# Patient Record
Sex: FEMALE | Race: WHITE | NOT HISPANIC OR LATINO | Employment: FULL TIME | URBAN - NONMETROPOLITAN AREA
[De-identification: names, ages, dates, MRNs, and addresses within clinical notes are randomized per-mention and may not be internally consistent; named-entity substitution may affect disease eponyms.]

---

## 2018-03-01 ENCOUNTER — DOCUMENTATION ONLY (OUTPATIENT)
Dept: FAMILY MEDICINE | Facility: OTHER | Age: 23
End: 2018-03-01

## 2018-03-01 PROBLEM — F17.200 NICOTINE USE DISORDER: Status: ACTIVE | Noted: 2018-03-01

## 2018-03-01 RX ORDER — DOCUSATE SODIUM 100 MG/1
100 CAPSULE, LIQUID FILLED ORAL 2 TIMES DAILY PRN
COMMUNITY
Start: 2016-11-14 | End: 2018-07-31

## 2018-03-01 RX ORDER — OXYCODONE AND ACETAMINOPHEN 5; 325 MG/1; MG/1
1-2 TABLET ORAL EVERY 4 HOURS PRN
COMMUNITY
Start: 2016-11-14 | End: 2018-07-31

## 2018-03-01 RX ORDER — BREAST PUMP
EACH MISCELLANEOUS
COMMUNITY
Start: 2016-11-14 | End: 2018-07-31

## 2018-03-01 RX ORDER — IBUPROFEN 600 MG/1
600 TABLET, FILM COATED ORAL EVERY 6 HOURS PRN
COMMUNITY
Start: 2016-11-14 | End: 2018-09-24

## 2018-07-31 ENCOUNTER — APPOINTMENT (OUTPATIENT)
Dept: GENERAL RADIOLOGY | Facility: OTHER | Age: 23
End: 2018-07-31
Attending: FAMILY MEDICINE

## 2018-07-31 ENCOUNTER — HOSPITAL ENCOUNTER (EMERGENCY)
Facility: OTHER | Age: 23
Discharge: HOME | End: 2018-07-31
Attending: FAMILY MEDICINE | Admitting: FAMILY MEDICINE

## 2018-07-31 VITALS
HEART RATE: 82 BPM | DIASTOLIC BLOOD PRESSURE: 78 MMHG | SYSTOLIC BLOOD PRESSURE: 132 MMHG | WEIGHT: 250 LBS | OXYGEN SATURATION: 99 % | TEMPERATURE: 98.2 F | HEIGHT: 62 IN | RESPIRATION RATE: 20 BRPM | BODY MASS INDEX: 46.01 KG/M2
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
## 2018-07-31 DIAGNOSIS — S80.01XA CONTUSION OF RIGHT KNEE, INITIAL ENCOUNTER: ICD-10-CM

## 2018-07-31 DIAGNOSIS — S20.219A CONTUSION OF CHEST WALL, UNSPECIFIED LATERALITY, INITIAL ENCOUNTER: ICD-10-CM

## 2018-07-31 LAB
ANION GAP SERPL CALCULATED.3IONS-SCNC: 9 MMOL/L (ref 3–14)
BASOPHILS # BLD AUTO: 0 10E9/L (ref 0–0.2)
BASOPHILS NFR BLD AUTO: 0.4 %
BUN SERPL-MCNC: 16 MG/DL (ref 7–25)
CALCIUM SERPL-MCNC: 10.1 MG/DL (ref 8.6–10.3)
CHLORIDE SERPL-SCNC: 105 MMOL/L (ref 98–107)
CO2 SERPL-SCNC: 24 MMOL/L (ref 21–31)
CREAT SERPL-MCNC: 0.61 MG/DL (ref 0.6–1.2)
DIFFERENTIAL METHOD BLD: ABNORMAL
EOSINOPHIL # BLD AUTO: 0.1 10E9/L (ref 0–0.7)
EOSINOPHIL NFR BLD AUTO: 1.5 %
ERYTHROCYTE [DISTWIDTH] IN BLOOD BY AUTOMATED COUNT: 18.7 % (ref 10–15)
GFR SERPL CREATININE-BSD FRML MDRD: >90 ML/MIN/1.7M2
GLUCOSE SERPL-MCNC: 110 MG/DL (ref 70–105)
HCT VFR BLD AUTO: 39.3 % (ref 35–47)
HGB BLD-MCNC: 12.7 G/DL (ref 11.7–15.7)
IMM GRANULOCYTES # BLD: 0.1 10E9/L (ref 0–0.4)
IMM GRANULOCYTES NFR BLD: 0.9 %
LYMPHOCYTES # BLD AUTO: 3.6 10E9/L (ref 0.8–5.3)
LYMPHOCYTES NFR BLD AUTO: 39.1 %
MCH RBC QN AUTO: 24.6 PG (ref 26.5–33)
MCHC RBC AUTO-ENTMCNC: 32.3 G/DL (ref 31.5–36.5)
MCV RBC AUTO: 76 FL (ref 78–100)
MONOCYTES # BLD AUTO: 0.5 10E9/L (ref 0–1.3)
MONOCYTES NFR BLD AUTO: 5.6 %
NEUTROPHILS # BLD AUTO: 4.9 10E9/L (ref 1.6–8.3)
NEUTROPHILS NFR BLD AUTO: 52.5 %
PLATELET # BLD AUTO: 322 10E9/L (ref 150–450)
POTASSIUM SERPL-SCNC: 3.6 MMOL/L (ref 3.5–5.1)
RBC # BLD AUTO: 5.16 10E12/L (ref 3.8–5.2)
SODIUM SERPL-SCNC: 138 MMOL/L (ref 134–144)
WBC # BLD AUTO: 9.3 10E9/L (ref 4–11)

## 2018-07-31 PROCEDURE — 93010 ELECTROCARDIOGRAM REPORT: CPT | Performed by: INTERNAL MEDICINE

## 2018-07-31 PROCEDURE — 36415 COLL VENOUS BLD VENIPUNCTURE: CPT | Performed by: FAMILY MEDICINE

## 2018-07-31 PROCEDURE — 93005 ELECTROCARDIOGRAM TRACING: CPT | Performed by: FAMILY MEDICINE

## 2018-07-31 PROCEDURE — 99284 EMERGENCY DEPT VISIT MOD MDM: CPT | Mod: Z6 | Performed by: FAMILY MEDICINE

## 2018-07-31 PROCEDURE — 99284 EMERGENCY DEPT VISIT MOD MDM: CPT | Mod: 25 | Performed by: FAMILY MEDICINE

## 2018-07-31 PROCEDURE — 80048 BASIC METABOLIC PNL TOTAL CA: CPT | Performed by: FAMILY MEDICINE

## 2018-07-31 PROCEDURE — 71045 X-RAY EXAM CHEST 1 VIEW: CPT

## 2018-07-31 PROCEDURE — 73562 X-RAY EXAM OF KNEE 3: CPT | Mod: RT

## 2018-07-31 PROCEDURE — 85025 COMPLETE CBC W/AUTO DIFF WBC: CPT | Performed by: FAMILY MEDICINE

## 2018-07-31 ASSESSMENT — ENCOUNTER SYMPTOMS
CHEST TIGHTNESS: 0
SINUS PRESSURE: 0
STRIDOR: 0
DIFFICULTY BREATHING: 0
ABDOMINAL PAIN: 0
SHORTNESS OF BREATH: 0
SEIZURES: 0
NAUSEA: 0
EYES NEGATIVE: 1
PSYCHIATRIC NEGATIVE: 1
TROUBLE SWALLOWING: 0
ABDOMINAL DISTENTION: 0
COLOR CHANGE: 0
COUGH: 0
PALPITATIONS: 0
LOSS OF CONSCIOUSNESS: 0
NECK PAIN: 0
WHEEZING: 0
HEADACHES: 0
VOICE CHANGE: 0
VOMITING: 0
APNEA: 0
FACIAL SWELLING: 0
NEUROLOGICAL NEGATIVE: 1
CHOKING: 0
HEMATOLOGIC/LYMPHATIC NEGATIVE: 1
BACK PAIN: 0
SINUS PAIN: 0
CONSTITUTIONAL NEGATIVE: 1

## 2018-07-31 NOTE — DISCHARGE INSTRUCTIONS
Chest Contusion    A contusion is a bruise to the skin, muscle, or ribs. It may cause pain, tenderness, and swelling. It may turn the skin purple until it heals. Contusions take a few days to a few weeks to heal.  Home care  Follow these guidelines when caring for yourself at home:    Rest. Don t do any heavy lifting or strenuous activity. Don t do any activity that causes pain.    Put an ice pack on the injured area. Do this for 20 minutes every 1 to 2 hours the first day. You can make an ice pack by wrapping a plastic bag of ice cubes in a thin towel. Continue to use the ice pack 3 to 4 times a day for the next 2 days. Then use the ice pack as needed to ease pain and swelling.    After 1 to 2 days you may put a warm compress on the area. Do this for 10 minutes several times a day. A warm compress is a clean cloth that s damp with warm water.    Hold a pillow to the affected area when you cough. This will help ease pain.    You may use over-the-counter pain medicine such as acetaminophen or ibuprofen to control pain, unless another pain medicine was prescribed. If you have chronic liver or kidney disease, talk with your healthcare provider before using these medicines. Also talk with your provider if you ve had a stomach ulcer or gastrointestinal bleeding.  Follow-up care  Follow up with your healthcare provider, or as advised.  When to seek medical advice  Call your healthcare provider right away if any of these occur:    New abdominal pain or abdominal pain that gets worse    Fever of 100.4 F (38 C) or higher, or as directed by your healthcare provider  Call 911  Call 911 if any of these occur:     Dizziness, weakness, or fainting    Shortness of breath, trouble breathing, or breathing fast    Chest pain gets worse when you breathe    Severe pain that comes on suddenly or lasts more than an hour  Date Last Reviewed: 5/1/2017 2000-2017 [employer]. [address], [place] [address]. All  rights reserved. This information is not intended as a substitute for professional medical care. Always follow your healthcare professional's instructions.          Lower Extremity Contusion  You have a contusion (bruise) of a lower extremity (leg, knee, ankle, foot, or toe). Symptoms include pain, swelling, and skin discoloration. No bones are broken. This injury may take from a few days to a few weeks to heal.  During that time, the bruise may change from reddish in color, to purple-blue, to green-yellow, to yellow-brown.  Home care    Unless another medicine was prescribed, you can take acetaminophen, ibuprofen, or naproxen to control pain. (If you have chronic liver or kidney disease or ever had a stomach ulcer or gastrointestinal bleeding, talk with your doctor before using these medicines.)    Elevate the injured area to reduce pain and swelling. As much as possible, sit or lie down with the injured area raised about the level of your heart. This is especially important during the first 48 hours.    Ice the injured area to help reduce pain and swelling. Wrap a cold source (ice pack or ice cubes in a plastic bag) in a thin towel. Apply to the bruised area for 20 minutes every 1 to 2 hours the first day. Continue this 3 to 4 times a day until the pain and swelling goes away.    If crutches have been advised, do not bear full weight on the injured leg until you can do so without pain. You may return to sports when you are able to put full weight and impact on the injured leg without pain.  Follow up  Follow up with your healthcare provider or our staff as advised. Call if you are not improving within the next 1 to 2 weeks.  When to seek medical advice   Call your healthcare provider right away if any of these occur:    Increased pain or swelling    Foot or toes become cold, blue, numb or tingly    Signs of infection: Warmth, drainage, or increased redness or pain around the injury    Inability to move the injured  area     Frequent bruising for unknown reasons  Date Last Reviewed: 2/1/2017 2000-2017 [employer]. [address] [place] [address], [place] [address]. All rights reserved. This information is not intended as a substitute for professional medical care. Always follow your healthcare professional's instructions.
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
## 2018-07-31 NOTE — ED AVS SNAPSHOT
[facility]    [address]    [place] [address]    Phone:  [phone]    Fax:  [phone]                                       Simi Godwin   MRN: [id number]    Department:  [facility]   Date of Visit:  7/31/2018           After Visit Summary Signature Page     I have received my discharge instructions, and my questions have been answered. I have discussed any challenges I see with this plan with the nurse or doctor.    ..........................................................................................................................................  Patient/Patient Representative Signature      ..........................................................................................................................................  Patient Representative Print Name and Relationship to Patient    ..................................................               ................................................  Date                                            Time    ..........................................................................................................................................  Reviewed by Signature/Title    ...................................................              ..............................................  Date                                                            Time
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
## 2018-09-10 ENCOUNTER — TELEPHONE (OUTPATIENT)
Dept: OBGYN | Facility: OTHER | Age: 23
End: 2018-09-10

## 2018-09-10 DIAGNOSIS — O36.80X0 ENCOUNTER TO DETERMINE FETAL VIABILITY OF PREGNANCY, SINGLE OR UNSPECIFIED FETUS: Primary | ICD-10-CM

## 2018-09-10 NOTE — TELEPHONE ENCOUNTER
Simi is scheduled to see BRYCE on 9/24/18 for her OB Px.  She thinks that she is about 10 weeks along, but is not sure.  Please order a dating ultrasound.  Ronel Loredo on 9/10/2018 at 11:18 AM

## 2018-09-10 NOTE — TELEPHONE ENCOUNTER
Patient is scheduled to be seen on 9-24-18 by Dr. ARMAAN Pereira for an Initial OB Physical. Patient has not had any imaging at this time. Provider prefers an ultrasound to determine dating and viability prior to appointment time.     Order placed per provider preference.    Miguelina Palmer ............. 9/10/2018 11:24 AM

## 2018-09-24 ENCOUNTER — HOSPITAL ENCOUNTER (OUTPATIENT)
Dept: ULTRASOUND IMAGING | Facility: OTHER | Age: 23
Discharge: HOME | End: 2018-09-24
Attending: OBSTETRICS & GYNECOLOGY | Admitting: OBSTETRICS & GYNECOLOGY

## 2018-09-24 ENCOUNTER — PRENATAL OFFICE VISIT (OUTPATIENT)
Dept: OBGYN | Facility: OTHER | Age: 23
End: 2018-09-24
Attending: OBSTETRICS & GYNECOLOGY

## 2018-09-24 VITALS
BODY MASS INDEX: 52.67 KG/M2 | HEART RATE: 86 BPM | WEIGHT: 286.2 LBS | HEIGHT: 62 IN | SYSTOLIC BLOOD PRESSURE: 136 MMHG | DIASTOLIC BLOOD PRESSURE: 82 MMHG

## 2018-09-24 DIAGNOSIS — Z34.81 ENCOUNTER FOR SUPERVISION OF OTHER NORMAL PREGNANCY IN FIRST TRIMESTER: ICD-10-CM

## 2018-09-24 DIAGNOSIS — Z23 NEED FOR PROPHYLACTIC VACCINATION AND INOCULATION AGAINST INFLUENZA: ICD-10-CM

## 2018-09-24 DIAGNOSIS — O36.80X0 ENCOUNTER TO DETERMINE FETAL VIABILITY OF PREGNANCY, SINGLE OR UNSPECIFIED FETUS: ICD-10-CM

## 2018-09-24 DIAGNOSIS — Z98.891 HISTORY OF C-SECTION: ICD-10-CM

## 2018-09-24 LAB
C TRACH DNA SPEC QL PROBE+SIG AMP: NOT DETECTED
GLUCOSE SERPL-MCNC: 92 MG/DL (ref 70–105)
MISCELLANEOUS TEST: NORMAL
N GONORRHOEA DNA SPEC QL PROBE+SIG AMP: NOT DETECTED
SPECIMEN SOURCE: NORMAL

## 2018-09-24 PROCEDURE — 87086 URINE CULTURE/COLONY COUNT: CPT | Performed by: OBSTETRICS & GYNECOLOGY

## 2018-09-24 PROCEDURE — 90471 IMMUNIZATION ADMIN: CPT | Performed by: OBSTETRICS & GYNECOLOGY

## 2018-09-24 PROCEDURE — 86780 TREPONEMA PALLIDUM: CPT | Performed by: OBSTETRICS & GYNECOLOGY

## 2018-09-24 PROCEDURE — 86900 BLOOD TYPING SEROLOGIC ABO: CPT | Performed by: OBSTETRICS & GYNECOLOGY

## 2018-09-24 PROCEDURE — 76801 OB US < 14 WKS SINGLE FETUS: CPT

## 2018-09-24 PROCEDURE — 86850 RBC ANTIBODY SCREEN: CPT | Performed by: OBSTETRICS & GYNECOLOGY

## 2018-09-24 PROCEDURE — 86762 RUBELLA ANTIBODY: CPT | Performed by: OBSTETRICS & GYNECOLOGY

## 2018-09-24 PROCEDURE — 81401 MOPATH PROCEDURE LEVEL 2: CPT | Performed by: OBSTETRICS & GYNECOLOGY

## 2018-09-24 PROCEDURE — 81220 CFTR GENE COM VARIANTS: CPT | Performed by: OBSTETRICS & GYNECOLOGY

## 2018-09-24 PROCEDURE — 82947 ASSAY GLUCOSE BLOOD QUANT: CPT | Performed by: OBSTETRICS & GYNECOLOGY

## 2018-09-24 PROCEDURE — 87389 HIV-1 AG W/HIV-1&-2 AB AG IA: CPT | Performed by: OBSTETRICS & GYNECOLOGY

## 2018-09-24 PROCEDURE — 84999 UNLISTED CHEMISTRY PROCEDURE: CPT | Performed by: OBSTETRICS & GYNECOLOGY

## 2018-09-24 PROCEDURE — 36415 COLL VENOUS BLD VENIPUNCTURE: CPT | Performed by: OBSTETRICS & GYNECOLOGY

## 2018-09-24 PROCEDURE — 87491 CHLMYD TRACH DNA AMP PROBE: CPT | Performed by: OBSTETRICS & GYNECOLOGY

## 2018-09-24 PROCEDURE — 87591 N.GONORRHOEAE DNA AMP PROB: CPT | Performed by: OBSTETRICS & GYNECOLOGY

## 2018-09-24 PROCEDURE — 86901 BLOOD TYPING SEROLOGIC RH(D): CPT | Performed by: OBSTETRICS & GYNECOLOGY

## 2018-09-24 PROCEDURE — 99207 ZZC OB VISIT-NO CHARGE - GICH ONLY: CPT | Performed by: OBSTETRICS & GYNECOLOGY

## 2018-09-24 PROCEDURE — 90686 IIV4 VACC NO PRSV 0.5 ML IM: CPT | Performed by: OBSTETRICS & GYNECOLOGY

## 2018-09-24 PROCEDURE — 87340 HEPATITIS B SURFACE AG IA: CPT | Performed by: OBSTETRICS & GYNECOLOGY

## 2018-09-24 ASSESSMENT — PAIN SCALES - GENERAL: PAINLEVEL: NO PAIN (0)

## 2018-09-24 NOTE — MR AVS SNAPSHOT
"              After Visit Summary   2018    Simi Godwin    MRN: [id number]           Patient Information     Date Of Birth          1995        Visit Information        Provider Department      2018 2:00 PM Tomer Pereira MD [facility]        Today's Diagnoses     BMI 50.0-59.9, adult (H)    -  1    Need for prophylactic vaccination and inoculation against influenza        Encounter for supervision of other normal pregnancy in first trimester        History of            Follow-ups after your visit        Follow-up notes from your care team     Return in about 4 weeks (around 10/22/2018).      Your next 10 appointments already scheduled     Oct 22, 2018 10:15 AM CDT   ESTABLISHED PRENATAL with Tomer Pereira MD   [facility] ([facility])    [address]  [place] [address] [phone]              Who to contact     If you have questions or need follow up information about today's clinic visit or your schedule please contact [facility] directly at [phone].  Normal or non-critical lab and imaging results will be communicated to you by [employer] [facility], letter or phone within 4 business days after the clinic has received the results. If you do not hear from us within 7 days, please contact the clinic through [employer] or phone. If you have a critical or abnormal lab result, we will notify you by phone as soon as possible.  Submit refill requests through [employer] or call your pharmacy and they will forward the refill request to us. Please allow 3 business days for your refill to be completed.          Additional Information About Your Visit        MyChart Information     [employer] lets you send messages to your doctor, view your test results, renew your prescriptions, schedule appointments and more. To sign up, go to [email or web address] . Click on \"Log in\" on the left side of the screen, " "which will take you to the Welcome page. Then click on \"Sign up Now\" on the right side of the page.     You will be asked to enter the access code listed below, as well as some personal information. Please follow the directions to create your username and password.     Your access code is: [email or web address]  Expires: 10/29/2018  3:41 AM     Your access code will  in 90 days. If you need help or a new code, please call your [facility] or [phone].        Care EveryWhere ID     This is your Care EveryWhere ID. This could be used by other organizations to access your [facility] medical records  [id number]        Your Vitals Were     Pulse Height Last Period BMI (Body Mass Index)          86 1.575 m (5' 2\") 2018 (Exact Date) 52.35 kg/m2         Blood Pressure from Last 3 Encounters:   18 136/82   18 132/78   16 (!) 174/104    Weight from Last 3 Encounters:   18 129.8 kg (286 lb 3.2 oz)   18 113.4 kg (250 lb)   16 (!) 140.5 kg (309 lb 12.8 oz)              We Performed the Following     ABO/Rh type and screen     ARUP Miscellaneous Test     Cystic Fibrosis 165 Path Variants     GC/Chlamydia by PCR - HI,GH     GH IMM-  HC FLU VAC PRESRV FREE QUAD SPLIT VIR 3+YRS IM     Glucose     Hepatitis B surface antigen     HIV Antigen Antibody Combo     Rubella Antibody IgG Quantitative     SMA screening: Laboratory Miscellaneous Order     Treponema Abs w Reflex to RPR and Titer     Urine Culture Aerobic Bacterial          Today's Medication Changes          These changes are accurate as of 18  4:38 PM.  If you have any questions, ask your nurse or doctor.               Stop taking these medicines if you haven't already. Please contact your care team if you have questions.     ibuprofen 600 MG tablet   Commonly known as:  ADVIL/MOTRIN   Stopped by:  Tomer Pereira MD                    Primary Care Provider Fax #    Physician No Ref-Primary [phone]       No address on " file        Equal Access to Services     HAKEEMPABLO FLORES : Hadii aad ku hadshardanuno Jenniferali, wanida luodiliajosé manuelha, qaraquel rogeliojermaintejal leos. So [facility] [phone].    ATENCIÓN: Si habla español, tiene a de paz disposición servicios gratuitos de asistencia lingüística. Llame al [phone].    We comply with applicable federal civil rights laws and [place] laws. We do not discriminate on the basis of race, color, national origin, age, disability, sex, sexual orientation, or gender identity.            Thank you!     Thank you for choosing [facility]  for your care. Our goal is always to provide you with excellent care. Hearing back from our patients is one way we can continue to improve our services. Please take a few minutes to complete the written survey that you may receive in the mail after your visit with us. Thank you!             Your Updated Medication List - Protect others around you: Learn how to safely use, store and throw away your medicines at www.disposemymeds.org.          This list is accurate as of 9/24/18  4:38 PM.  Always use your most recent med list.                   Brand Name Dispense Instructions for use Diagnosis    omeprazole 20 MG CR capsule    priLOSEC     Take 20 mg by mouth daily        PRENATAL 1 PO

## 2018-09-24 NOTE — PROGRESS NOTES
CC: New OB visit  HPI:  Simi Godwin is  at 12w3d based on first trimester US.  She notes issues of fatigue, nausea.    Obstetric History       T1      L1     SAB0   TAB0   Ectopic0   Multiple0   Live Births1       # Outcome Date GA Lbr Ariel/2nd Weight Sex Delivery Anes PTL Lv   2 Current            1 Term 16 40w0d  3.317 kg (7 lb 5 oz) M CS-LVertical Spinal,EPI N CAROLINA      Name: Josh      Complications: Preeclampsia/Hypertension        STI: (denies HSV, Hep C, Hep B, HIV, Syphilis, Chlamydia, Gonorrhea)  Last pap smear: No results found for: PAP  Chickenpox history: as a child, plus shingles x 2.  Past Medical History:   Diagnosis Date     Antepartum hemorrhage     2016     Nicotine dependence, uncomplicated     No Comments Provided     Obesity complicating pregnancy     2016     Polyhydramnios     No Comments Provided     Supraventricular tachycardia (H)          Underimmunization status     2016      has a past surgical history that includes Release carpal tunnel; other surgical history; and Tonsillectomy.    Social History   Substance Use Topics     Smoking status: Current Every Day Smoker     Packs/day: 0.25     Years: 2.00     Types: Cigarettes     Smokeless tobacco: Never Used     Alcohol use No      Comment: occasional     Family History   Problem Relation Age of Onset     Cancer Paternal Grandfather      Cancer     Diabetes Maternal Grandfather      Diabetes         Current Outpatient Prescriptions   Medication     omeprazole (PRILOSEC) 20 MG CR capsule     Prenatal MV-Min-Fe Fum-FA-DHA (PRENATAL 1 PO)     No current facility-administered medications for this visit.      No Known Allergies    There is no immunization history on file for this patient.        REVIEW OF SYSTEMS  Resp: No shortness of breath, dyspnea on exertion, cough, or hemoptysis  CV: negative  GI: negative  : negative  Neurologic: negative  Psychiatric: negative  Hematologic:  "negative  Endocrine: negative    EXAM: /82 (BP Location: Right arm)  Pulse 86  Ht 1.575 m (5' 2\")  Wt 129.8 kg (286 lb 3.2 oz)  LMP 2018 (Exact Date)  BMI 52.35 kg/m2  Gen: NAD  CV: RRR with normal S1, S2, no GRM  Resp: CTA Bilaterally  Breasts: normal without suspicious masses, skin changes or axillary nodes, symmetric fibrous changes in both upper outer quadrants.  Abdomen: NT, ND  Pelvic exam: normal vagina and vulva, normal cervix without lesions or tenderness, exam chaperoned by nurse.  Extremities: No TTP, no deformity  Neuro: CN II-XII intact grossly, moves all extremities  Psych: normal affect and mentation.    I/P  (Z23) Need for prophylactic vaccination and inoculation against influenza  Comment:   Plan: flu shot toda    (Z34.81) Encounter for supervision of other normal pregnancy in first trimester  Comment:   Plan: ABO/Rh type and screen, Hepatitis B surface         antigen, HIV Antigen Antibody Combo, Rubella         Antibody IgG Quantitative, Treponema Abs w         Reflex to RPR and Titer, Urine Culture Aerobic         Bacterial, GC/Chlamydia by PCR - HI,GH            Discussed safety, nutrition, screening for cystic fibrosis, spina bifida, spinal muscular atrophy, quad screen, cffDNA screening as appropriate.  F/U scheduled, discussed call schedule rotation with FPCHALO and Dr. Arcos, general surgery.  Return visit in 1 month     Pregnancy risk factors include:  Prior -fetal distress  Body mass index is 52.35 kg/(m^2). Anesthesia consult for airway, spinal at her BMI  History of preeclampsia- baby asa daily    Tomer Pereira MD FACOG  2:18 PM 2018       "

## 2018-09-25 LAB
ABO + RH BLD: NORMAL
ABO + RH BLD: NORMAL
BLD GP AB SCN SERPL QL: NORMAL
BLOOD BANK CMNT PATIENT-IMP: NORMAL
SPECIMEN EXP DATE BLD: NORMAL

## 2018-09-26 LAB
BACTERIA SPEC CULT: NORMAL
HBV SURFACE AG SERPL QL IA: NONREACTIVE
HIV 1+2 AB+HIV1 P24 AG SERPL QL IA: NONREACTIVE
SPECIMEN SOURCE: NORMAL

## 2018-09-27 LAB
RUBV IGG SERPL IA-ACNC: 22 IU/ML
T PALLIDUM AB SER QL: NONREACTIVE

## 2018-09-30 LAB
RESULT: NORMAL
SEND OUTS MISC TEST CODE: NORMAL
SEND OUTS MISC TEST SPECIMEN: NORMAL
TEST NAME: NORMAL

## 2018-10-01 LAB
CFTR ALLELE 2 BLD/T QL: NEGATIVE
CFTR P.R117H+5T VAR BLD/T QL: NORMAL
CYSTIC FIBROSIS 165 VARIANT INTERP: NORMAL

## 2018-10-22 ENCOUNTER — PRENATAL OFFICE VISIT (OUTPATIENT)
Dept: OBGYN | Facility: OTHER | Age: 23
End: 2018-10-22
Attending: OBSTETRICS & GYNECOLOGY

## 2018-10-22 VITALS
HEART RATE: 100 BPM | BODY MASS INDEX: 53.41 KG/M2 | SYSTOLIC BLOOD PRESSURE: 126 MMHG | WEIGHT: 292 LBS | DIASTOLIC BLOOD PRESSURE: 86 MMHG

## 2018-10-22 DIAGNOSIS — O99.212 OBESITY AFFECTING PREGNANCY IN SECOND TRIMESTER: ICD-10-CM

## 2018-10-22 DIAGNOSIS — Z34.92 NORMAL PREGNANCY IN SECOND TRIMESTER: ICD-10-CM

## 2018-10-22 DIAGNOSIS — Z98.891 HISTORY OF C-SECTION: Primary | ICD-10-CM

## 2018-10-22 PROCEDURE — 99207 ZZC OB VISIT-NO CHARGE - GICH ONLY: CPT | Performed by: OBSTETRICS & GYNECOLOGY

## 2018-10-22 ASSESSMENT — PAIN SCALES - GENERAL: PAINLEVEL: NO PAIN (0)

## 2018-10-22 NOTE — MR AVS SNAPSHOT
"              After Visit Summary   10/22/2018    Simi Godwin    MRN: [id number]           Patient Information     Date Of Birth          1995        Visit Information        Provider Department      10/22/2018 10:15 AM Tomer Pereira MD [facility]        Today's Diagnoses     History of     -  1    Obesity affecting pregnancy in second trimester        BMI 50.0-59.9, adult (H)        Normal pregnancy in second trimester           Follow-ups after your visit        Follow-up notes from your care team     Return in about 4 weeks (around 2018).      Future tests that were ordered for you today     Open Future Orders        Priority Expected Expires Ordered    US OB > 14 Weeks Routine 2018 10/22/2019 10/22/2018            Who to contact     If you have questions or need follow up information about today's clinic visit or your schedule please contact [facility] directly at [phone].  Normal or non-critical lab and imaging results will be communicated to you by [employer], letter or phone within 4 business days after the clinic has received the results. If you do not hear from us within 7 days, please contact the clinic through [employer] or phone. If you have a critical or abnormal lab result, we will notify you by phone as soon as possible.  Submit refill requests through [employer] or call your pharmacy and they will forward the refill request to us. Please allow 3 business days for your refill to be completed.          Additional Information About Your Visit        [employer] Information     [employer] lets you send messages to your doctor, view your test results, renew your prescriptions, schedule appointments and more. To sign up, go to [email or web address] . Click on \"Log in\" on the left side of the screen, which will take you to the Welcome page. Then click on \"Sign up Now\" on the right side of the page.     You will be asked to enter the " access code listed below, as well as some personal information. Please follow the directions to create your username and password.     Your access code is: [email or web address]  Expires: 10/29/2018  3:41 AM     Your access code will  in 90 days. If you need help or a new code, please call your [facility] or [phone].        Care EveryWhere ID     This is your Care EveryWhere ID. This could be used by other organizations to access your [place] medical records  [id number]        Your Vitals Were     Pulse Last Period BMI (Body Mass Index)             100 2018 (Exact Date) 53.41 kg/m2          Blood Pressure from Last 3 Encounters:   10/22/18 126/86   18 136/82   18 132/78    Weight from Last 3 Encounters:   10/22/18 132.5 kg (292 lb)   18 129.8 kg (286 lb 3.2 oz)   18 113.4 kg (250 lb)               Primary Care Provider Fax #    Physician No Ref-Primary [phone]       No address on file        Equal Access to Services     [facility] : Hadii tiffany Vitale, waaxda luyahaira, qaybta kaalmafrancisco batista, tejal morrow . So [facility] [phone].    ATENCIÓN: Si habla español, tiene a de paz disposición servicios gratuitos de asistencia lingüística. Marielena al [phone].    We comply with applicable federal civil rights laws and [place] laws. We do not discriminate on the basis of race, color, national origin, age, disability, sex, sexual orientation, or gender identity.            Thank you!     Thank you for choosing [facility]  for your care. Our goal is always to provide you with excellent care. Hearing back from our patients is one way we can continue to improve our services. Please take a few minutes to complete the written survey that you may receive in the mail after your visit with us. Thank you!             Your Updated Medication List - Protect others around you: Learn how to safely use, store and throw away your  medicines at www.disposemymeds.org.          This list is accurate as of 10/22/18 10:38 AM.  Always use your most recent med list.                   Brand Name Dispense Instructions for use Diagnosis    aspirin 81 MG tablet      Take 1 tablet (81 mg) by mouth daily        omeprazole 20 MG CR capsule    priLOSEC     Take 20 mg by mouth daily        PRENATAL 1 PO

## 2018-10-22 NOTE — PROGRESS NOTES
CC: Recheck OB visit at 15w2d    HPI: Simi Godwin presents for a routine OB visit now at 15w2d  She has no concerns. Denies cramping, bleeding, normal fetal movement    Obstetric History       T1      L1     SAB0   TAB0   Ectopic0   Multiple0   Live Births1       # Outcome Date GA Lbr Ariel/2nd Weight Sex Delivery Anes PTL Lv   2 Current            1 Term 16 40w0d  3.317 kg (7 lb 5 oz) M CS-LVertical Spinal,EPI N CAROLINA      Name: Josh      Complications: Preeclampsia/Hypertension        Current Outpatient Prescriptions   Medication     aspirin 81 MG tablet     omeprazole (PRILOSEC) 20 MG CR capsule     Prenatal MV-Min-Fe Fum-FA-DHA (PRENATAL 1 PO)     No current facility-administered medications for this visit.          O: /86 (BP Location: Right arm, Patient Position: Sitting, Cuff Size: Adult Large)  Pulse 100  Wt 132.5 kg (292 lb)  LMP 2018 (Exact Date)  BMI 53.41 kg/m2  Body mass index is 53.41 kg/(m^2).  See OB flow sheet  EXAM:  NAD  FHT's 151    Results for orders placed or performed in visit on 18   Hepatitis B surface antigen   Result Value Ref Range    Hep B Surface Agn Nonreactive NR^Nonreactive   HIV Antigen Antibody Combo   Result Value Ref Range    HIV Antigen Antibody Combo Nonreactive NR^Nonreactive       Rubella Antibody IgG Quantitative   Result Value Ref Range    Rubella Antibody IgG Quantitative 22 IU/mL   Treponema Abs w Reflex to RPR and Titer   Result Value Ref Range    Treponema Antibodies Nonreactive NR^Nonreactive   Glucose   Result Value Ref Range    Glucose 92 70 - 105 mg/dL   Cystic Fibrosis 165 Path Variants   Result Value Ref Range    Cystic Fibrosis Allele 2 Negative     Cystic Fibrosis 5T Variant Not Applicable     Cystic Fibrosis 165 Variant Interp 0 variants    SMA screening: Laboratory Miscellaneous Order   Result Value Ref Range    Miscellaneous Test         Specimen Received, Reordered and sent to Performing laboratory - Report to  follow upon   completion.     [facility] Miscellaneous Test   Result Value Ref Range    Result SEE NOTE     Test Name       Spinal Muscular Atrophy (SMA) Prenatal Screen Copy Number Analysis    Send Outs Misc Test Code [id number]     Send Outs Misc Test Specimen Whole blood, EDTA anticoagulant    ABO/Rh type and screen   Result Value Ref Range    ABO A     RH(D) Pos     Antibody Screen Neg     Test Valid Only At [facility]        Specimen Expires 2018    Urine Culture Aerobic Bacterial   Result Value Ref Range    Specimen Description Midstream Urine     Culture Micro       10,000 to 50,000 colonies/mL  mixed urogenital alejandra  No further identification or sensitivity done     GC/Chlamydia by PCR - HI,GH   Result Value Ref Range    Specimen Source Urine     Neisseria gonorrhoreae PCR Not Detected NDET^Not Detected    Chlamydia Trachomatis PCR Not Detected NDET^Not Detected       A/P: 15w2d gestation    Recheck in 4 weeks    Pregnancy risk factors include:  Prior -fetal distress  Body mass index is 52.35 kg/(m^2). Anesthesia consult for airway, spinal at her BMI  History of preeclampsia- baby asa daily    Tomer Pereira MD FACOG  10:28 AM 10/22/2018

## 2018-10-22 NOTE — NURSING NOTE
Patient presents today for her prenatal check-up. She is currently 15w2d.  Danitza Calero LPN  10/22/2018  10:20 AM

## 2018-11-19 ENCOUNTER — HOSPITAL ENCOUNTER (OUTPATIENT)
Dept: ULTRASOUND IMAGING | Facility: OTHER | Age: 23
Discharge: HOME | End: 2018-11-19
Attending: OBSTETRICS & GYNECOLOGY | Admitting: OBSTETRICS & GYNECOLOGY
Payer: MEDICAID

## 2018-11-19 ENCOUNTER — PRENATAL OFFICE VISIT (OUTPATIENT)
Dept: OBGYN | Facility: OTHER | Age: 23
End: 2018-11-19
Attending: OBSTETRICS & GYNECOLOGY
Payer: MEDICAID

## 2018-11-19 VITALS
HEART RATE: 96 BPM | RESPIRATION RATE: 18 BRPM | DIASTOLIC BLOOD PRESSURE: 88 MMHG | WEIGHT: 292.6 LBS | BODY MASS INDEX: 53.52 KG/M2 | TEMPERATURE: 98.9 F | SYSTOLIC BLOOD PRESSURE: 136 MMHG

## 2018-11-19 DIAGNOSIS — O16.2 HYPERTENSION AFFECTING PREGNANCY IN SECOND TRIMESTER: Primary | ICD-10-CM

## 2018-11-19 DIAGNOSIS — Z34.92 NORMAL PREGNANCY IN SECOND TRIMESTER: ICD-10-CM

## 2018-11-19 DIAGNOSIS — O99.212 OBESITY AFFECTING PREGNANCY IN SECOND TRIMESTER: ICD-10-CM

## 2018-11-19 DIAGNOSIS — Z98.891 HISTORY OF C-SECTION: ICD-10-CM

## 2018-11-19 PROCEDURE — 99207 ZZC OB VISIT-NO CHARGE - GICH ONLY: CPT | Performed by: OBSTETRICS & GYNECOLOGY

## 2018-11-19 PROCEDURE — 76805 OB US >/= 14 WKS SNGL FETUS: CPT

## 2018-11-19 ASSESSMENT — PAIN SCALES - GENERAL: PAINLEVEL: NO PAIN (0)

## 2018-11-19 NOTE — PROGRESS NOTES
CC: Recheck OB visit at 19w2d    HPI: Simi Godwin presents for a routine OB visit now at 19w2d  She has no concerns. Denies cramping, bleeding, normal fetal movement    Obstetric History       T1      L1     SAB0   TAB0   Ectopic0   Multiple0   Live Births1       # Outcome Date GA Lbr Ariel/2nd Weight Sex Delivery Anes PTL Lv   2 Current            1 Term 16 40w0d  3.317 kg (7 lb 5 oz) M CS-LVertical Spinal,EPI N CAROLINA      Name: Josh      Complications: Preeclampsia/Hypertension        Current Outpatient Prescriptions   Medication     aspirin 81 MG tablet     Prenatal MV-Min-Fe Fum-FA-DHA (PRENATAL 1 PO)     No current facility-administered medications for this visit.          O: /88 (BP Location: Right arm, Patient Position: Sitting, Cuff Size: Adult Large)  Pulse 96  Temp 98.9  F (37.2  C) (Tympanic)  Resp 18  Wt 132.7 kg (292 lb 9.6 oz)  LMP 2018 (Exact Date)  Breastfeeding? No  BMI 53.52 kg/m2  Body mass index is 53.52 kg/(m^2).  See OB flow sheet  EXAM:  NAD  's  US reviewed    Results for orders placed or performed during the hospital encounter of 18   US OB > 14 Weeks    Addendum: 2018    Addendum: There is a typographical error in the original report. The  correct head circumference should read 18 weeks 6 days.    KEYUR MORA MD      Narrative    OB ULTRASOUND REPORT     Clinical history:  ; Normal pregnancy in second trimester     Gestation:  1  Presentation: Cephalic  Lie:  Longitudinal    Cardiac Activity:  Regular  BPM:  156  Movement:  Yes    Placenta: Posterior  stGstrstastdstest:st st1st Previa:  Suboptimally assessed      Cervix:  5.1 cm in length    JACI:  13.2 cm    Measurements:    BPD:  19 weeks 0 days  HC:  8 weeks 6 days  AC:  20 weeks 3 days  FL:  19 weeks 3 days    Estimated Fetal Weight:  306 grams  HC/AC:  1.07    US age:  19 weeks 3 days  Gestational Age by LMP:  19 weeks 2 days  US EDC (Current Study):  2019   %WT for EGA  (Based on  First Study):  52 %    Structural Survey:    Head:  Unremarkable  Spine:  Unremarkable  Stomach:  Unremarkable  Kidneys:  Suboptimal due to body habitus  Bladder:  Unremarkable  3 Vessel Cord:  Unremarkable  Cord Insertion:  Unremarkable  4 Chamber Heart, RVOT, LVOT:  Suboptimal due to body habitus and  position  Ant. Abd Wall:  Suboptimal  Diaphragm:  Unremarkable  Situs: Unremarkable          Impression    Impression: Suboptimal due to positioning and body habitus. Multiple  fetal structures were not well assessed. Consider follow-up.      KEYUR MORA MD       A/P: 19w2d gestation    Recheck in 4 weeks  F/u US for completion of fetal survey.  GCT next visit    Pregnancy risk factors include:  Prior -fetal distress   Body mass index is 53.52 kg/(m^2).   Anesthesia consult for airway, spinal at her BMI  History of preeclampsia- baby asa daily    Tomer Pereira MD FACOG  11:45 AM 2018

## 2018-11-19 NOTE — MR AVS SNAPSHOT
"              After Visit Summary   2018    Simi Godwin    MRN: [id number]           Patient Information     Date Of Birth          1995        Visit Information        Provider Department      2018 11:00 AM Tomer Pereira MD [facility]        Today's Diagnoses     Hypertension affecting pregnancy in second trimester    -  1    BMI 50.0-59.9, adult (H)        Obesity affecting pregnancy in second trimester        History of            Follow-ups after your visit        Follow-up notes from your care team     Return in about 4 weeks (around 2018).      Future tests that were ordered for you today     Open Future Orders        Priority Expected Expires Ordered    US OB >14 Weeks Follow Up Routine  2019    Protein Random Urine Routine  2019            Who to contact     If you have questions or need follow up information about today's clinic visit or your schedule please contact [facility] directly at [phone].  Normal or non-critical lab and imaging results will be communicated to you by [employer], letter or phone within 4 business days after the clinic has received the results. If you do not hear from us within 7 days, please contact the clinic through [employer] or phone. If you have a critical or abnormal lab result, we will notify you by phone as soon as possible.  Submit refill requests through [employer] or call your pharmacy and they will forward the refill request to us. Please allow 3 business days for your refill to be completed.          Additional Information About Your Visit        [employer] Information     [employer] lets you send messages to your doctor, view your test results, renew your prescriptions, schedule appointments and more. To sign up, go to [email or web address] . Click on \"Log in\" on the left side of the screen, which will take you to the Welcome page. Then click on \"Sign up Now\" " on the right side of the page.     You will be asked to enter the access code listed below, as well as some personal information. Please follow the directions to create your username and password.     Your access code is: [email or web address]  Expires: 2019 11:01 AM     Your access code will  in 90 days. If you need help or a new code, please call your [facility] or [phone].        Care EveryWhere ID     This is your Care EveryWhere ID. This could be used by other organizations to access your [place] medical records  [id number]        Your Vitals Were     Pulse Temperature Respirations Last Period Breastfeeding? BMI (Body Mass Index)    96 98.9  F (37.2  C) (Tympanic) 18 2018 (Exact Date) No 53.52 kg/m2       Blood Pressure from Last 3 Encounters:   18 136/88   10/22/18 126/86   18 136/82    Weight from Last 3 Encounters:   18 132.7 kg (292 lb 9.6 oz)   10/22/18 132.5 kg (292 lb)   18 129.8 kg (286 lb 3.2 oz)               Primary Care Provider Fax #    Physician No Ref-Primary [phone]       No address on file        Equal Access to Services     HEATHER TANNER : Hadii aad ku hadasho Soanuelali, waaxda luqadaha, qaybta kaalmada adeegyada, tejal morrow . So [facility] [phone].    ATENCIÓN: Si habla español, tiene a de paz disposición servicios gratuitos de asistencia lingüística. Llame al [phone].    We comply with applicable federal civil rights laws and [place] laws. We do not discriminate on the basis of race, color, national origin, age, disability, sex, sexual orientation, or gender identity.            Thank you!     Thank you for choosing [facility]  for your care. Our goal is always to provide you with excellent care. Hearing back from our patients is one way we can continue to improve our services. Please take a few minutes to complete the written survey that you may receive in the mail after your visit with us.  Thank you!             Your Updated Medication List - Protect others around you: Learn how to safely use, store and throw away your medicines at www.disposemymeds.org.          This list is accurate as of 11/19/18  1:42 PM.  Always use your most recent med list.                   Brand Name Dispense Instructions for use Diagnosis    aspirin 81 MG tablet      Take 1 tablet (81 mg) by mouth daily        PRENATAL 1 PO

## 2018-11-19 NOTE — NURSING NOTE
Patient is here for ob check 19w2d, 1 Babystep coupon given.  Denisse Sweet LPN .............11/19/2018     10:59 AM      Patient's last menstrual period was 06/29/2018 (exact date).  Medication Reconciliation: complete    Denisse Sweet LPN  11/19/2018 11:05 AM

## 2018-12-20 ENCOUNTER — PRENATAL OFFICE VISIT (OUTPATIENT)
Dept: OBGYN | Facility: OTHER | Age: 23
End: 2018-12-20
Attending: OBSTETRICS & GYNECOLOGY
Payer: MEDICAID

## 2018-12-20 ENCOUNTER — HOSPITAL ENCOUNTER (OUTPATIENT)
Dept: ULTRASOUND IMAGING | Facility: OTHER | Age: 23
Discharge: HOME | End: 2018-12-20
Attending: OBSTETRICS & GYNECOLOGY | Admitting: OBSTETRICS & GYNECOLOGY
Payer: MEDICAID

## 2018-12-20 VITALS
WEIGHT: 293 LBS | HEART RATE: 84 BPM | BODY MASS INDEX: 54.38 KG/M2 | SYSTOLIC BLOOD PRESSURE: 122 MMHG | DIASTOLIC BLOOD PRESSURE: 90 MMHG

## 2018-12-20 DIAGNOSIS — O16.2 HYPERTENSION AFFECTING PREGNANCY IN SECOND TRIMESTER: ICD-10-CM

## 2018-12-20 DIAGNOSIS — Z34.92 NORMAL PREGNANCY IN SECOND TRIMESTER: Primary | ICD-10-CM

## 2018-12-20 PROCEDURE — 76816 OB US FOLLOW-UP PER FETUS: CPT

## 2018-12-20 PROCEDURE — 99207 ZZC OB VISIT-NO CHARGE - GICH ONLY: CPT | Performed by: OBSTETRICS & GYNECOLOGY

## 2018-12-20 RX ORDER — IBUPROFEN 600 MG/1
600 TABLET, FILM COATED ORAL
COMMUNITY
Start: 2016-11-14 | End: 2018-12-20

## 2018-12-20 RX ORDER — DOCUSATE SODIUM 100 MG/1
100 CAPSULE, LIQUID FILLED ORAL
COMMUNITY
Start: 2016-11-14 | End: 2019-01-31

## 2018-12-20 RX ORDER — OXYCODONE AND ACETAMINOPHEN 5; 325 MG/1; MG/1
1-2 TABLET ORAL
COMMUNITY
Start: 2016-11-14 | End: 2018-12-20

## 2018-12-20 ASSESSMENT — ANXIETY QUESTIONNAIRES
6. BECOMING EASILY ANNOYED OR IRRITABLE: NOT AT ALL
3. WORRYING TOO MUCH ABOUT DIFFERENT THINGS: NOT AT ALL
5. BEING SO RESTLESS THAT IT IS HARD TO SIT STILL: NOT AT ALL
2. NOT BEING ABLE TO STOP OR CONTROL WORRYING: NOT AT ALL
IF YOU CHECKED OFF ANY PROBLEMS ON THIS QUESTIONNAIRE, HOW DIFFICULT HAVE THESE PROBLEMS MADE IT FOR YOU TO DO YOUR WORK, TAKE CARE OF THINGS AT HOME, OR GET ALONG WITH OTHER PEOPLE: NOT DIFFICULT AT ALL
GAD7 TOTAL SCORE: 0
4. TROUBLE RELAXING: NOT AT ALL
7. FEELING AFRAID AS IF SOMETHING AWFUL MIGHT HAPPEN: NOT AT ALL
1. FEELING NERVOUS, ANXIOUS, OR ON EDGE: NOT AT ALL

## 2018-12-20 ASSESSMENT — PAIN SCALES - GENERAL: PAINLEVEL: NO PAIN (0)

## 2018-12-20 ASSESSMENT — PATIENT HEALTH QUESTIONNAIRE - PHQ9: SUM OF ALL RESPONSES TO PHQ QUESTIONS 1-9: 0

## 2018-12-20 NOTE — NURSING NOTE
Patient presents to the clinic for her OB visit. She is 23w5d.  Medication Reconciliation Completed.    Cong Gomez LPN  12/20/2018 11:03 AM

## 2018-12-20 NOTE — PROGRESS NOTES
CC: Recheck OB visit at 23w5d    HPI: Simi Godwin presents for a routine OB visit now at 23w5d  She has no concerns. Denies cramping, bleeding, normal fetal movement. Some upper abdominal cramps she associates with being a bit constipated.    Obstetric History       T1      L1     SAB0   TAB0   Ectopic0   Multiple0   Live Births1       # Outcome Date GA Lbr Ariel/2nd Weight Sex Delivery Anes PTL Lv   2 Current            1 Term 16 40w0d  3.317 kg (7 lb 5 oz) M CS-LVertical Spinal, EPI N CAROLINA      Name: Josh      Complications: Preeclampsia/Hypertension        Current Outpatient Medications   Medication     docusate sodium (COLACE) 100 MG capsule     ibuprofen (ADVIL/MOTRIN) 600 MG tablet     oxyCODONE-acetaminophen (PERCOCET) 5-325 MG tablet     aspirin 81 MG tablet     omeprazole (PRILOSEC) 20 MG DR capsule     Prenatal MV-Min-Fe Fum-FA-DHA (PRENATAL 1 PO)     No current facility-administered medications for this visit.          O: /90 (BP Location: Right arm, Patient Position: Sitting, Cuff Size: Adult Large)   Pulse 84   Wt 134.9 kg (297 lb 4.8 oz)   LMP 2018 (Exact Date)   BMI 54.38 kg/m    Body mass index is 54.38 kg/m .  See OB flow sheet  EXAM:  NAD  FH 28  FHT's 140    Results for orders placed or performed during the hospital encounter of 18    OB > 14 Weeks    Addendum: 2018    Addendum: There is a typographical error in the original report. The  correct head circumference should read 18 weeks 6 days.    KEYUR MORA MD      Narrative    OB ULTRASOUND REPORT     Clinical history:  ; Normal pregnancy in second trimester     Gestation:  1  Presentation: Cephalic  Lie:  Longitudinal    Cardiac Activity:  Regular  BPM:  156  Movement:  Yes    Placenta: Posterior  stGstrstastdstest:st st1st Previa:  Suboptimally assessed      Cervix:  5.1 cm in length    JACI:  13.2 cm    Measurements:    BPD:  19 weeks 0 days  HC:  8 weeks 6 days  AC:  20 weeks 3 days  FL:  19 weeks 3  days    Estimated Fetal Weight:  306 grams  HC/AC:  1.07    US age:  19 weeks 3 days  Gestational Age by LMP:  19 weeks 2 days  US EDC (Current Study):  2019   %WT for EGA  (Based on First Study):  52 %    Structural Survey:    Head:  Unremarkable  Spine:  Unremarkable  Stomach:  Unremarkable  Kidneys:  Suboptimal due to body habitus  Bladder:  Unremarkable  3 Vessel Cord:  Unremarkable  Cord Insertion:  Unremarkable  4 Chamber Heart, RVOT, LVOT:  Suboptimal due to body habitus and  position  Ant. Abd Wall:  Suboptimal  Diaphragm:  Unremarkable  Situs: Unremarkable          Impression    Impression: Suboptimal due to positioning and body habitus. Multiple  fetal structures were not well assessed. Consider follow-up.      KEYUR MORA MD       A/P: (Z34.92) Normal pregnancy in second trimester  (primary encounter diagnosis)  Comment:   Plan: Glucose tolerance, gest screen, 1 hour,         Hemoglobin, Treponema Ab w Reflex to RPR and         Titer              Recheck in 4 weeks  Increase fluid and fiber intake.    Pregnancy risk factors include:  Prior -fetal distress   Body mass index is 53.52 kg/(m^2).   Anesthesia consult for airway, spinal at her BMI  History of preeclampsia- baby asa daily  S>D clinically, growth scans    Tomer Pereira MD FACOG  11:16 AM 2018

## 2018-12-21 ASSESSMENT — ANXIETY QUESTIONNAIRES: GAD7 TOTAL SCORE: 0

## 2019-01-17 ENCOUNTER — PRENATAL OFFICE VISIT (OUTPATIENT)
Dept: OBGYN | Facility: OTHER | Age: 24
End: 2019-01-17
Attending: OBSTETRICS & GYNECOLOGY
Payer: MEDICAID

## 2019-01-17 VITALS
DIASTOLIC BLOOD PRESSURE: 98 MMHG | SYSTOLIC BLOOD PRESSURE: 146 MMHG | BODY MASS INDEX: 55.09 KG/M2 | HEART RATE: 86 BPM | WEIGHT: 293 LBS

## 2019-01-17 DIAGNOSIS — O16.2 HYPERTENSION AFFECTING PREGNANCY IN SECOND TRIMESTER: ICD-10-CM

## 2019-01-17 DIAGNOSIS — O24.419 GESTATIONAL DIABETES MELLITUS (GDM) IN THIRD TRIMESTER, GESTATIONAL DIABETES METHOD OF CONTROL UNSPECIFIED: ICD-10-CM

## 2019-01-17 DIAGNOSIS — E74.39 GLUCOSE INTOLERANCE: Primary | ICD-10-CM

## 2019-01-17 DIAGNOSIS — Z34.92 NORMAL PREGNANCY IN SECOND TRIMESTER: ICD-10-CM

## 2019-01-17 LAB
ALBUMIN MFR UR ELPH: 28 MG/DL (ref 1–14)
CREAT UR-MCNC: 144 MG/DL
GLUCOSE 1H P 50 G GLC PO SERPL-MCNC: 149 MG/DL (ref 60–129)
HGB BLD-MCNC: 10.4 G/DL (ref 11.7–15.7)
PROT/CREAT 24H UR: 0.19 MG/G{CREAT}

## 2019-01-17 PROCEDURE — 82950 GLUCOSE TEST: CPT | Performed by: OBSTETRICS & GYNECOLOGY

## 2019-01-17 PROCEDURE — 90715 TDAP VACCINE 7 YRS/> IM: CPT | Performed by: OBSTETRICS & GYNECOLOGY

## 2019-01-17 PROCEDURE — 99207 ZZC OB VISIT-NO CHARGE - GICH ONLY: CPT | Performed by: OBSTETRICS & GYNECOLOGY

## 2019-01-17 PROCEDURE — 85018 HEMOGLOBIN: CPT | Performed by: OBSTETRICS & GYNECOLOGY

## 2019-01-17 PROCEDURE — 90471 IMMUNIZATION ADMIN: CPT | Performed by: OBSTETRICS & GYNECOLOGY

## 2019-01-17 PROCEDURE — 84156 ASSAY OF PROTEIN URINE: CPT | Performed by: OBSTETRICS & GYNECOLOGY

## 2019-01-17 PROCEDURE — 86780 TREPONEMA PALLIDUM: CPT | Performed by: OBSTETRICS & GYNECOLOGY

## 2019-01-17 PROCEDURE — 36415 COLL VENOUS BLD VENIPUNCTURE: CPT | Performed by: OBSTETRICS & GYNECOLOGY

## 2019-01-17 ASSESSMENT — PAIN SCALES - GENERAL: PAINLEVEL: NO PAIN (0)

## 2019-01-17 NOTE — PROGRESS NOTES
CC: Recheck OB visit at 27w5d    HPI: Simi Godwin presents for a routine OB visit now at 27w5d  She has no concerns. Denies cramping, bleeding, normal fetal movement. No headaches or RUQ pain.    Obstetric History       T1      L1     SAB0   TAB0   Ectopic0   Multiple0   Live Births1       # Outcome Date GA Lbr Ariel/2nd Weight Sex Delivery Anes PTL Lv   2 Current            1 Term 16 40w0d  3.317 kg (7 lb 5 oz) M CS-LVertical Spinal, EPI N CAROLINA      Name: Josh      Complications: Preeclampsia/Hypertension        Current Outpatient Medications   Medication     aspirin 81 MG tablet     docusate sodium (COLACE) 100 MG capsule     omeprazole (PRILOSEC) 20 MG DR capsule     Prenatal MV-Min-Fe Fum-FA-DHA (PRENATAL 1 PO)     No current facility-administered medications for this visit.          O: BP (!) 146/98   Pulse 86   Wt 136.6 kg (301 lb 3.2 oz)   LMP 2018 (Exact Date)   BMI 55.09 kg/m    Body mass index is 55.09 kg/m .  See OB flow sheet  EXAM:  NAD  FHT's:150  FH:38    Results for orders placed or performed during the hospital encounter of 18   US OB >14 Weeks Follow Up    Narrative    OB ULTRASOUND REPORT     Clinical history:  Hypertension affecting pregnancy in second  trimester     Comparison: 2018    Gestation:  1  Presentation: Transverse  Lie:  Oblique    Cardiac Activity:  Regular  BPM:  152  Movement:  Yes    Placenta: Posterior  stGstrstastdstest:st st1st Previa:  No Previa      Cervix:  6.1 cm in length    JACI:  16.8 cm    Measurements:    BPD:  23 weeks 0 days  HC:  23 weeks 5 days  AC:  25 weeks 6 days  FL:  24 weeks 6 days    Estimated Fetal Weight:  762 grams  HC/AC:  1.01    US age:  24 weeks 3 days  Gestational Age by LMP:  23 weeks 5 days  US EDC (Current Study):  2019   %WT for EGA  (Based on First Study):  75 %    Structural Survey:    Head:  Unremarkable  Spine:  Limited by position.  Stomach:  Unremarkable  Kidneys:  Unremarkable  Bladder:  Unremarkable  3  Vessel Cord:  Unremarkable  Cord Insertion:  Unremarkable  4 Chamber Heart, RVOT, LVOT:  Unremarkable  Ant. Abd Wall:  Unremarkable  Diaphragm:  Unremarkable  Situs: Unremarkable    Images are suboptimal due to body habitus and position.      Impression    Impression:   1. Although images remain suboptimal due to body habitus, no gross  fetal abnormalities are seen.  2. Appropriate interval fetal growth.      KEYUR MORA MD       A/P:  (O16.2) Hypertension affecting pregnancy in second trimester  Comment:   Plan:  Labs to r/o PIH or preeclampsia today  US for growth and fluid monthly  Home BP cuff ordered  Will like schedule her for repeat  at 37 weeks now with gestational hypertension  Encouraged smoking cessation.    Recheck in 2 weeks    Pregnancy risk factors include:  Prior -fetal distress   Body mass index is 53.52 kg/(m^2).   Anesthesia consult for airway, spinal at her BMI  History of preeclampsia- baby asa daily  S>D clinically, growth scans  Gestational HTN    Tomer Pereira MD FACOG  11:13 AM 2019

## 2019-01-17 NOTE — NURSING NOTE
"Chief Complaint   Patient presents with     Prenatal Care     27w5d       Initial BP (!) 146/98   Pulse 86   Wt 136.6 kg (301 lb 3.2 oz)   LMP 06/29/2018 (Exact Date)   BMI 55.09 kg/m   Estimated body mass index is 55.09 kg/m  as calculated from the following:    Height as of 9/24/18: 1.575 m (5' 2\").    Weight as of this encounter: 136.6 kg (301 lb 3.2 oz).  Medication Reconciliation: Completed    Kyara Wasserman LPN............. 1/17/2019 11:33 AM   Prior to injection, verified patient identity using patient's name and date of birth.  Due to injection administration, patient instructed to remain in clinic for 15 minutes  afterwards, and to report any adverse reaction to me immediately.    Lillie Wasserman LPN............. 1/17/2019 11:33 AM     "

## 2019-01-18 LAB — T PALLIDUM AB SER QL: NONREACTIVE

## 2019-01-30 DIAGNOSIS — O16.2 HYPERTENSION AFFECTING PREGNANCY IN SECOND TRIMESTER: ICD-10-CM

## 2019-01-30 DIAGNOSIS — E74.39 GLUCOSE INTOLERANCE: ICD-10-CM

## 2019-01-30 LAB
ALBUMIN SERPL-MCNC: 3.5 G/DL (ref 3.5–5.7)
ALP SERPL-CCNC: 80 U/L (ref 34–104)
ALT SERPL W P-5'-P-CCNC: 7 U/L (ref 7–52)
AST SERPL W P-5'-P-CCNC: 10 U/L (ref 13–39)
BILIRUB DIRECT SERPL-MCNC: 0.1 MG/DL (ref 0–0.2)
BILIRUB SERPL-MCNC: 0.3 MG/DL (ref 0.3–1)
CREAT SERPL-MCNC: 0.46 MG/DL (ref 0.6–1.2)
ERYTHROCYTE [DISTWIDTH] IN BLOOD BY AUTOMATED COUNT: 15.6 % (ref 10–15)
GFR SERPL CREATININE-BSD FRML MDRD: >90 ML/MIN/{1.73_M2}
GLUCOSE 1H P 100 G GLC PO SERPL-MCNC: 221 MG/DL (ref 60–179)
GLUCOSE 2H P 100 G GLC PO SERPL-MCNC: 144 MG/DL (ref 60–154)
GLUCOSE 3H P 100 G GLC PO SERPL-MCNC: 84 MG/DL (ref 60–139)
GLUCOSE P FAST SERPL-MCNC: 96 MG/DL (ref 60–94)
HCT VFR BLD AUTO: 33.1 % (ref 35–47)
HGB BLD-MCNC: 10.5 G/DL (ref 11.7–15.7)
MCH RBC QN AUTO: 23.7 PG (ref 26.5–33)
MCHC RBC AUTO-ENTMCNC: 31.7 G/DL (ref 31.5–36.5)
MCV RBC AUTO: 75 FL (ref 78–100)
PLATELET # BLD AUTO: 204 10E9/L (ref 150–450)
PROT SERPL-MCNC: 6.5 G/DL (ref 6.4–8.9)
RBC # BLD AUTO: 4.43 10E12/L (ref 3.8–5.2)
WBC # BLD AUTO: 9.3 10E9/L (ref 4–11)

## 2019-01-30 PROCEDURE — 82952 GTT-ADDED SAMPLES: CPT | Performed by: OBSTETRICS & GYNECOLOGY

## 2019-01-30 PROCEDURE — 82565 ASSAY OF CREATININE: CPT | Performed by: OBSTETRICS & GYNECOLOGY

## 2019-01-30 PROCEDURE — 82951 GLUCOSE TOLERANCE TEST (GTT): CPT | Performed by: OBSTETRICS & GYNECOLOGY

## 2019-01-30 PROCEDURE — 80076 HEPATIC FUNCTION PANEL: CPT | Performed by: OBSTETRICS & GYNECOLOGY

## 2019-01-30 PROCEDURE — 85027 COMPLETE CBC AUTOMATED: CPT | Performed by: OBSTETRICS & GYNECOLOGY

## 2019-01-30 PROCEDURE — 36415 COLL VENOUS BLD VENIPUNCTURE: CPT | Performed by: OBSTETRICS & GYNECOLOGY

## 2019-01-31 ENCOUNTER — PRENATAL OFFICE VISIT (OUTPATIENT)
Dept: OBGYN | Facility: OTHER | Age: 24
End: 2019-01-31
Attending: OBSTETRICS & GYNECOLOGY
Payer: MEDICAID

## 2019-01-31 ENCOUNTER — HOSPITAL ENCOUNTER (OUTPATIENT)
Dept: ULTRASOUND IMAGING | Facility: OTHER | Age: 24
Discharge: HOME | End: 2019-01-31
Attending: OBSTETRICS & GYNECOLOGY | Admitting: OBSTETRICS & GYNECOLOGY
Payer: MEDICAID

## 2019-01-31 VITALS
WEIGHT: 293 LBS | BODY MASS INDEX: 55.02 KG/M2 | HEART RATE: 78 BPM | SYSTOLIC BLOOD PRESSURE: 136 MMHG | DIASTOLIC BLOOD PRESSURE: 86 MMHG

## 2019-01-31 DIAGNOSIS — O24.419 GESTATIONAL DIABETES MELLITUS (GDM) IN THIRD TRIMESTER, GESTATIONAL DIABETES METHOD OF CONTROL UNSPECIFIED: Primary | ICD-10-CM

## 2019-01-31 DIAGNOSIS — O16.2 HYPERTENSION AFFECTING PREGNANCY IN SECOND TRIMESTER: ICD-10-CM

## 2019-01-31 PROCEDURE — 76816 OB US FOLLOW-UP PER FETUS: CPT

## 2019-01-31 PROCEDURE — 99207 ZZC OB VISIT-NO CHARGE - GICH ONLY: CPT | Performed by: OBSTETRICS & GYNECOLOGY

## 2019-01-31 ASSESSMENT — PAIN SCALES - GENERAL: PAINLEVEL: NO PAIN (0)

## 2019-01-31 NOTE — PATIENT INSTRUCTIONS
Patient Education   Sample Meal Plans for Gestational Diabetes  These sample meal plans show how to balance your carbohydrates (carbs) throughout the day. Carb servings are listed next to each food. Every meal includes a protein source.  These sample meals should not replace the meal plans you receive from your diabetes care team. Always follow your provider's instructions.  Tips:    Eat six to seven small, well-balanced meals and snacks each day.    Do not go more than 3 hours without eating.    Limit sweets such as sugar, honey, syrup, jam, jelly, desserts, juices, regular soda pop and other sweetened drinks.  Sample Meals - Day 1  Breakfast: 2-3 carb servings  2 slices whole-grain bread (2)  1 egg (0)  1 cup low-fat milk (1)  Morning snack: 1-2 carb servings  1 orange (1)  3 ben crackers, 2 1/2-inch squares each (1)  Lunch: 3-4 carb servings  Tuna salad sandwich (2)  Raw vegetables (0)  1 cup fresh strawberries (1)  1 cup low-fat milk or 100-calorie plain or flavored yogurt (1)  Afternoon snack: 1-2 carb servings  1 banana (2)  Low-fat cheese (0)  Dinner: 3-4 carb servings  3 ounces lean beef, pork, chicken or fish (0)  1 medium baked potato (2)  1/2 cup cooked broccoli (0)  Dinner roll with margarine (1)  Tossed salad with dressing (0)  1 cup low-fat milk (1)  Bedtime snack: 2 carb servings  6 saltine crackers with peanut butter (1)  Small pieces of fruit (1)  Sample Meals - Day 2  Breakfast: 2-3 carb servings  English muffin with peanut butter (2)  100-calorie plain or flavored yogurt (1)  Morning snack: 1-2 carb servings  1 apple (1)  6 to 8 crackers with either low-fat cheese or peanut butter (1)  Lunch: 3-4 carb servings  Chicken caesar salad (0)  2 slices Czech bread with margarine (2)  1 cup melon cubes (1)  1 cup low-fat milk (1)  Afternoon snack: 2 carb servings  3 cups popped popcorn (1)  1 orange (1)  Cheese stick (0)  Dinner: 3-4 carb servings  1 cup beef noodle casserole (2)  1/2 cup green  "beans (0)  Tossed salad with dressing (0)  1/2 cup canned \"lite\" fruit (1)  1 cup low-fat milk (1)  Bedtime snack: 2 carb servings  1 slice toast with peanut butter (1)  1 cup low-fat milk (1)  Please speak with your diabetes educator if you do not drink milk, do not eat meat or have any questions.   For informational purposes only. Not to replace the advice of your health care provider.  Copyright   2006 [employer] [facility]. All rights reserved. "[employer] 538715 - REV 05/17.       Patient Education     Blood Glucose Screening During Pregnancy     Your healthcare provider will talk with you about blood glucose screening.   Gestational diabetes is diabetes that only pregnant women get. Changes in your body during pregnancy can cause high blood sugar (glucose). This can cause problems for you and your baby. It is a serious condition, but it can be controlled.  Who is at risk for gestational diabetes?  You are at risk of getting gestational diabetes if any of the following risk factors apply to you. The risk for gestational diabetes becomes higher as your number of risk factors increases:    You are , , , , or .    You weigh more than your healthcare provider says is healthy for you.    You have a relative with diabetes.    You are older than age 25.    You had gestational diabetes during a past pregnancy.    You had a stillbirth or a very large baby before.    You have a history of abnormal glucose tolerance.    You have sugar in your urine at the first prenatal visit.    You have metabolic syndrome, polycystic ovary syndrome (PCOS), currently use glucocorticoids, or have hypertension.    You have multiple gestation (twins, etc.).  What happens during a screening?  Here is what to expect during a blood glucose screening:    While conflicting recommendations for screening exist, the American College of Obstetricians and Gynecologists currently recommends " universal screening for gestational diabetes. Your risk for gestational diabetes will determine when you are screened. Women are tested at 24 to 28 weeks of pregnancy. Women at high risk may be tested when they first learn they are pregnant.    To do the screening, a blood sample is taken and your blood sugar level is measured.    If the results show a high blood sugar level, a glucose tolerance test may be ordered. You will drink a specific amount of sugar. This test measures the amount of time it takes for sugar to leave your blood. The test will determine if you have gestational diabetes.  What to know if you test positive  Here are some things you need to know:    Gestational diabetes is treatable. The best way to control gestational diabetes is to find out you have it as early as possible and start treatment quickly.    Gestational diabetes can cause problems for the mother during pregnancy. It can also cause problems with the baby during pregnancy, delivery, and after. Treatment greatly lowers the chance for problems.    The changes in your body that cause gestational diabetes normally happen only when you are pregnant. After the baby is born, your body goes back to normal and the condition goes away. You may be more likely to have type 2 diabetes later, though. So talk to your healthcare provider about ways to help prevent type 2 diabetes.  Treating gestational diabetes  Here is how to treat gestational diabetes:    You ll need to check your blood sugar regularly. You can do this at home by pricking your finger and checking a drop of blood on a glucose monitor. Your healthcare provider will show you how and when to check your blood sugar and discuss your target blood sugar level.    To manage your blood sugar, you will be given a special plan. It will likely involve planning your meals and getting regular exercise. Some women need to take a hormone called insulin, or an oral hypoglycemic medicine to help  control their blood sugar.  Date Last Reviewed: 10/1/2017    0377-4686 [employer]. [address] [place] [address], [place] [address]. All rights reserved. This information is not intended as a substitute for professional medical care. Always follow your healthcare professional's instructions.           Patient Education     Monitoring Your Blood Sugar During Pregnancy     Your healthcare provider will make sure you know how to check your blood sugar correctly.     The only way to be sure your blood sugar stays within a normal range is to check it regularly. You will most likely be asked to check your blood sugar at home 1 or more times a day. Your healthcare provider will teach you how. He or she may also ask you to check your urine at home.   Checking your blood sugar at home  Your healthcare provider will discuss the best way and times for you to check your blood sugar, and show you what to do. Your blood sugar is usually highest about an hour after you eat. You can check it using a blood glucose meter.    Be sure to read the instructions that come with your meter. Follow them carefully.    Record your blood sugar level every time you check it. Bring your records and your meter to all your appointments with your healthcare provider.  Your blood sugar   Your blood sugar should be as follows:  Less than           __ when you get up.  Less than           after breakfast.  Less than          after lunch.  Less than             after dinner.  Check your blood sugar when you get up and 1 to 2 hour(s) after breakfast, lunch, and dinner, as your healthcare provider instructs.  When to call your healthcare provider  Your blood sugar is above             for more than                        .   If you check your urine at home  If you don t eat enough, your body will burn fat to get energy. This leaves ketones in your urine. Your healthcare provider may have you check your urine for ketones each morning. You ll use  special strips that change color if there are ketones. If you have ketones, this may mean that you re not getting enough calories. Your healthcare provider may make changes in your meal plan.  When to call your healthcare provider  You have ketones in your urine for more than 2 day(s) in a row.   Date Last Reviewed: 2/1/2018 2000-2018 [employer]. [address] [place], [place] [id number]. All rights reserved. This information is not intended as a substitute for professional medical care. Always follow your healthcare professional's instructions.

## 2019-01-31 NOTE — PROGRESS NOTES
CC: Recheck OB visit at 29w5d    HPI: Simi Godwin presents for a routine OB visit now at 29w5d  She has no concerns. Denies cramping, bleeding, normal fetal movement. She has been diagnosed with GDM. She is  Planning a repeat  and possible tubal ligation.    Obstetric History       T1      L1     SAB0   TAB0   Ectopic0   Multiple0   Live Births1       # Outcome Date GA Lbr Ariel/2nd Weight Sex Delivery Anes PTL Lv   2 Current            1 Term 16 40w0d  3.317 kg (7 lb 5 oz) M CS-LVertical Spinal, EPI N CAROLINA      Name: Josh      Complications: Preeclampsia/Hypertension        Current Outpatient Medications   Medication     aspirin 81 MG tablet     blood glucose monitoring (NO BRAND SPECIFIED) meter device kit     Blood Pressure Monitor KIT     omeprazole (PRILOSEC) 20 MG DR capsule     Prenatal MV-Min-Fe Fum-FA-DHA (PRENATAL 1 PO)     No current facility-administered medications for this visit.          O: /86 (BP Location: Right arm)   Pulse 78   Wt 136.4 kg (300 lb 12.8 oz)   LMP 2018 (Exact Date)   BMI 55.02 kg/m    Body mass index is 55.02 kg/m .  See OB flow sheet  EXAM:  NAD  FH:   FHT's: 160    Results for orders placed or performed during the hospital encounter of 19   US OB >14 Weeks Follow Up    Narrative    OB ULTRASOUND REPORT     Clinical history:  Hypertension affecting pregnancy in second  trimester     Comparison: 2018    Gestation:  1  Presentation: Transverse  Lie:  Transverse    Cardiac Activity:  Regular  BPM:  160  Movement:  Yes    Placenta: Fundal  ndGndrndanddndend:nd nd2nd Previa:  No Previa      Cervix:  Not assessed     JACI:  15.2 cm    Measurements:    BPD:  29 weeks 6 days  HC:  31 weeks 1 days  AC:  30 weeks 6 days  FL:  31 weeks 0 days    Estimated Fetal Weight:  1631 grams  HC/AC:  1.06    US age:  30 weeks 5 days  Gestational Age by LMP:  29 weeks 5 days  US EDC (Current Study):  2019   %WT for EGA  (Based on First Study):  72 %         Impression    Impression:   1. Appropriate interval fetal growth. Estimated fetal weight is 1631 g  which is at the 72nd percentile for gestational age.  2. JACI is 15.2 cm.      KEYUR MORA MD       A/P: Recheck in 1 week    Diabetic ed  Start blood sugar testing  Schedule  for 19  considering tubal   Consult with anesthesia for high BMI, but had an uncomplicated  last time.    Pregnancy risk factors include:  Prior -fetal distress   Body mass index is 53.52 kg/(m^2).   Anesthesia consult for airway, spinal at her BMI  History of preeclampsia- baby asa daily  S>D clinically, growth scans  Gestational HTN  Gestational DM    Tomer Pereira MD FACOG  10:35 AM 2019

## 2019-01-31 NOTE — PROGRESS NOTES
"Date of Surgery: 19  Type of Surgery: Repeat  with possible bilateral salpingectomy  Surgeon: Dr. Tomer Pereira MD, FACOG    Patient's consents were signed and appropriate appointments were scheduled by the [facility] . Patient was given surgical folder which includes pre-operative bathing instructions related to the two packets of Hibiclens surgical prep provided. Copies made and kept for informative purposes, and originals were delivered to Day-surgery. Patient denies questions at this time.    STOP BANG    Fever/Chills or other infectious symptoms in past month? No  >10 pound weight loss in the past 2 months? No  Health Care Directive on file? No  History of blood transfusions? No  Td up to date? Yes  History of VRE/MRSA? No      Obstructive Sleep Apnea screening    Preoperative Evaluation: Obstructive Sleep Apnea screening    S: Snore -  Do you snore loudly? (louder than talking or loud enough to be heard through closed doors)No  T: Tired - Do you often feel tired, fatigued, or sleepy during the daytime?No  O: Observed - Has anyone ever observed you stop breathing during your sleep?No  P: Pressure - Do you have or are you being treated for high blood pressure?No  B: BMI - BMI greater than 35kg/m2?Yes  A: Age - Age over 50 years old?No  N: Neck - Neck circumference greater than 40 cm?No  G: Gender - Gender: Male?No    Total number of \"YES\" responses:  1    Scoring: Low risk of LEOLA 0-2  At Risk of LEOLA: >3 High Risk of LEOLA: 5-8    Total yes answers in LEOLA section:    Low risk 0-2  At risk 3-4  High risk 5-8    Pao Wasserman............. 2019 10:58 AM     "

## 2019-02-12 ENCOUNTER — ANESTHESIA (OUTPATIENT)
Dept: ANESTHESIOLOGY | Facility: OTHER | Age: 24
End: 2019-02-12

## 2019-02-12 ENCOUNTER — PRENATAL OFFICE VISIT (OUTPATIENT)
Dept: OBGYN | Facility: OTHER | Age: 24
End: 2019-02-12
Attending: OBSTETRICS & GYNECOLOGY
Payer: MEDICAID

## 2019-02-12 ENCOUNTER — ANESTHESIA EVENT (OUTPATIENT)
Dept: ANESTHESIOLOGY | Facility: OTHER | Age: 24
End: 2019-02-12

## 2019-02-12 VITALS
RESPIRATION RATE: 20 BRPM | SYSTOLIC BLOOD PRESSURE: 132 MMHG | WEIGHT: 293 LBS | DIASTOLIC BLOOD PRESSURE: 84 MMHG | HEART RATE: 72 BPM | BODY MASS INDEX: 55.24 KG/M2

## 2019-02-12 DIAGNOSIS — Z34.90 NORMAL PREGNANCY, ANTEPARTUM: Primary | ICD-10-CM

## 2019-02-12 DIAGNOSIS — O24.410 DIET CONTROLLED GESTATIONAL DIABETES MELLITUS (GDM) IN THIRD TRIMESTER: ICD-10-CM

## 2019-02-12 PROCEDURE — 99207 ZZC OB VISIT-NO CHARGE - GICH ONLY: CPT | Performed by: OBSTETRICS & GYNECOLOGY

## 2019-02-12 ASSESSMENT — PAIN SCALES - GENERAL: PAINLEVEL: NO PAIN (0)

## 2019-02-12 NOTE — CONSULTS
Pt seen today for an anesthesia consult and airway evaluation prior to her . Previous spinal anesthetic with last , denies complications. She denies any history of airway difficulties.  The risks were explained to her regarding high BMI individuals and potential airway difficulties. Evaluation of the airway today demonstrated no signifigant issues.  Patient was noted to have a Malampati score of 1 with good neck flexion & extension, & adequate mental to thyroid cartilage distance.

## 2019-02-12 NOTE — PROGRESS NOTES
CC: Recheck OB visit at 31w3d    HPI: Simi Godwin presents for a routine OB visit now at 31w3d  She has no concerns. Denies cramping, bleeding, normal fetal movement.    Obstetric History       T1      L1     SAB0   TAB0   Ectopic0   Multiple0   Live Births1       # Outcome Date GA Lbr Ariel/2nd Weight Sex Delivery Anes PTL Lv   2 Current            1 Term 16 40w0d  3.317 kg (7 lb 5 oz) M CS-LVertical Spinal, EPI N CAROLINA      Name: Josh      Complications: Preeclampsia/Hypertension        Current Outpatient Medications   Medication     aspirin 81 MG tablet     blood glucose monitoring (NO BRAND SPECIFIED) meter device kit     Blood Pressure Monitor KIT     omeprazole (PRILOSEC) 20 MG DR capsule     Prenatal MV-Min-Fe Fum-FA-DHA (PRENATAL 1 PO)     No current facility-administered medications for this visit.          O: /84 (BP Location: Right arm, Patient Position: Sitting, Cuff Size: Adult Large)   Pulse 72   Resp 20   Wt 137 kg (302 lb)   LMP 2018 (Exact Date)   Breastfeeding? No   BMI 55.24 kg/m    Body mass index is 55.24 kg/m .  See OB flow sheet  EXAM:  NAD  FHT's 160  EFW s>d    Results for orders placed or performed during the hospital encounter of 19   US OB >14 Weeks Follow Up    Narrative    OB ULTRASOUND REPORT     Clinical history:  Hypertension affecting pregnancy in second  trimester     Comparison: 2018    Gestation:  1  Presentation: Transverse  Lie:  Transverse    Cardiac Activity:  Regular  BPM:  160  Movement:  Yes    Placenta: Fundal  ndGndrndanddndend:nd nd2nd Previa:  No Previa      Cervix:  Not assessed     JACI:  15.2 cm    Measurements:    BPD:  29 weeks 6 days  HC:  31 weeks 1 days  AC:  30 weeks 6 days  FL:  31 weeks 0 days    Estimated Fetal Weight:  1631 grams  HC/AC:  1.06    US age:  30 weeks 5 days  Gestational Age by LMP:  29 weeks 5 days  US EDC (Current Study):  2019   %WT for EGA  (Based on First Study):  72 %        Impression     Impression:   1. Appropriate interval fetal growth. Estimated fetal weight is 1631 g  which is at the 72nd percentile for gestational age.  2. JACI is 15.2 cm.      KEYUR MORA MD       A/P: 31w3d gestation    Recheck in 2 weeks       Pregnancy risk factors include:  Prior -fetal distress   Body mass index is 53.52 kg/(m^2).   Anesthesia consult for airway, spinal at her BMI- anesthesia eval done.  History of preeclampsia- baby asa daily  S>D clinically, growth scans- normal EFW as of 19  Gestational HTN  Gestational DM- diet control    Tomer Pereira MD FACOG  1:09 PM 2019

## 2019-02-12 NOTE — NURSING NOTE
Chief Complaint   Patient presents with     Prenatal Care     31W3D        Medication Reconciliation: completed   Marlee Pal LPN  2/12/2019 12:41 PM

## 2019-02-28 ENCOUNTER — PRENATAL OFFICE VISIT (OUTPATIENT)
Dept: OBGYN | Facility: OTHER | Age: 24
End: 2019-02-28
Attending: OBSTETRICS & GYNECOLOGY
Payer: MEDICAID

## 2019-02-28 ENCOUNTER — ALLIED HEALTH/NURSE VISIT (OUTPATIENT)
Dept: EDUCATION SERVICES | Facility: OTHER | Age: 24
End: 2019-02-28
Attending: OBSTETRICS & GYNECOLOGY
Payer: MEDICAID

## 2019-02-28 VITALS
BODY MASS INDEX: 51.91 KG/M2 | RESPIRATION RATE: 20 BRPM | SYSTOLIC BLOOD PRESSURE: 130 MMHG | WEIGHT: 293 LBS | HEIGHT: 63 IN | DIASTOLIC BLOOD PRESSURE: 72 MMHG | HEART RATE: 80 BPM

## 2019-02-28 DIAGNOSIS — O24.419 GESTATIONAL DIABETES: Primary | ICD-10-CM

## 2019-02-28 DIAGNOSIS — O09.93 HIGH-RISK PREGNANCY IN THIRD TRIMESTER: Primary | ICD-10-CM

## 2019-02-28 PROCEDURE — G0108 DIAB MANAGE TRN  PER INDIV: HCPCS

## 2019-02-28 PROCEDURE — 99207 ZZC OB VISIT-NO CHARGE - GICH ONLY: CPT | Performed by: OBSTETRICS & GYNECOLOGY

## 2019-02-28 ASSESSMENT — PAIN SCALES - GENERAL: PAINLEVEL: NO PAIN (0)

## 2019-02-28 ASSESSMENT — MIFFLIN-ST. JEOR: SCORE: 2080.59

## 2019-02-28 NOTE — PATIENT INSTRUCTIONS
Gestational Diabetes Guidelines      It is important to consume regular meals and snacks (eating every 2-3 hours), increasing fiber in your diet for improved post-meal glucose levels and increasing label reading for total carbohydrates.      Your meal plan is designed to help you with quantities, glycemic control and to decrease risk of ketone production.       MEAL PLAN:  Plan Breakfast Snack Lunch Snack Dinner Snack   Carb grams 30 15 45 15 45 15 - 30       SUMMARY FOR TODAY'S VISIT:         1.  Follow meal plan as discussed.         2.  Continue current activity, as tolerated.         3.  Begin testing Blood Glucose(BG) 4 x daily:  Fasting and 2-hours after each meal.  (BG target:  FBG/Pre-meal 60 - 95 and 2-hr after meal less than 120 or 1-hr after meal less than 130)          4.  Continue regular contact with diabetes educator, as needed.         5.  Follow up with O.B.Provider on a regular basis.        Vivian Cruz RN, BSN, CDE  2/28/2019 11:19 AM

## 2019-02-28 NOTE — PROGRESS NOTES
Gestational DM Initial Visit    Visit included:  Diabetes Topics- Path physiology, Healthy Eating, Physical Activity, Monitoring, Problem Solving and Risk Reduction    Subjective: Simi Godwin is a 24 year old female who has gestational diabetes.    Objective:  Pre-pregnancy weight 284 lbs  Weight 2/28/2019 301 lbs  Pt's current physical activity habits include walking for various minutes most days.     Assessment:  Reviewed diabetes guidelines.  Emphasized the importance of consuming regular meals and snacks (eating every 2-3 hours), increasing fiber in diet for improved post-prandial glucose levels and increased label reading for total carbohydrates.      Reviewed meal plan for patient to help her with quantities, glycemic control and to decrease risk of ketone production.     BG meter training given and patient return demonstration without difficulty.   Patient will begin checking her BG 4 times daily, fasting and 2-hr PP.   Blood Glucose Target Ranges:  Fasting 60-95, pre-meal 60-95, 2 hour pp less than 120.       Changes to patient's medications include none.    MEAL PLAN:  Plan Breakfast Snack Lunch Snack Dinner Snack   CHO grams 30 15 45 15 45 15 - 30     PLAN:       1.  Follow meal plan as discussed.         2.  Continue current activity, as tolerated.         3.  Pt instructed to begin testing BG 4 x daily:  Fasting and 2-hours after each meal.         4.  Pt instructed to continue regular contact with diabetes educator.         5.  Follow up with O.B. Provider on a regular basis.    Time spent with patient was 60 minutes.     Vivian Cruz RN, BSN, CDE  2/28/2019 4:59 PM

## 2019-02-28 NOTE — PROGRESS NOTES
CC: Recheck OB visit at 33w5d    HPI: Simi Godwin presents for a routine OB visit now at 33w5d  She has no concerns. Denies cramping, bleeding, normal fetal movement.    Obstetric History       T1      L1     SAB0   TAB0   Ectopic0   Multiple0   Live Births1       # Outcome Date GA Lbr Ariel/2nd Weight Sex Delivery Anes PTL Lv   2 Current            1 Term 16 40w0d  3.317 kg (7 lb 5 oz) M CS-LVertical Spinal, EPI N CAROLINA      Name: Josh      Complications: Preeclampsia/Hypertension        Current Outpatient Medications   Medication     aspirin 81 MG tablet     blood glucose monitoring (NO BRAND SPECIFIED) meter device kit     Blood Pressure Monitor KIT     omeprazole (PRILOSEC) 20 MG DR capsule     Prenatal MV-Min-Fe Fum-FA-DHA (PRENATAL 1 PO)     No current facility-administered medications for this visit.          O: LMP 2018 (Exact Date)   There is no height or weight on file to calculate BMI.  See OB flow sheet  EXAM:  NAD  FH: 40  FHT: 150    Results for orders placed or performed during the hospital encounter of 19   US OB >14 Weeks Follow Up    Narrative    OB ULTRASOUND REPORT     Clinical history:  Hypertension affecting pregnancy in second  trimester     Comparison: 2018    Gestation:  1  Presentation: Transverse  Lie:  Transverse    Cardiac Activity:  Regular  BPM:  160  Movement:  Yes    Placenta: Fundal  ndGndrndanddndend:nd nd2nd Previa:  No Previa      Cervix:  Not assessed     JACI:  15.2 cm    Measurements:    BPD:  29 weeks 6 days  HC:  31 weeks 1 days  AC:  30 weeks 6 days  FL:  31 weeks 0 days    Estimated Fetal Weight:  1631 grams  HC/AC:  1.06    US age:  30 weeks 5 days  Gestational Age by LMP:  29 weeks 5 days  US EDC (Current Study):  2019   %WT for EGA  (Based on First Study):  72 %        Impression    Impression:   1. Appropriate interval fetal growth. Estimated fetal weight is 1631 g  which is at the 72nd percentile for gestational age.  2. JACI is 15.2  cm.      KEYUR MORA MD       A/P: 33w5d gestation    Recheck in 2 weeks  US in two weeks to confirm continued adequate growth.    Pregnancy risk factors include:  Prior -fetal distress   Body mass index is 53.52 kg/(m^2).   Anesthesia consult for airway, spinal at her BMI- anesthesia eval done.  History of preeclampsia- baby asa daily  S>D clinically, growth scans- normal EFW as of 19  Gestational HTN  Gestational DM- diet control    Tomer Pereira MD FACOG  11:37 AM 2019

## 2019-02-28 NOTE — NURSING NOTE
Chief Complaint   Patient presents with     Prenatal Care     33W5D       Medication Reconciliation: completed   Marlee aPl LPN  2/28/2019 11:40 AM

## 2019-03-03 ENCOUNTER — HOSPITAL ENCOUNTER (OUTPATIENT)
Facility: OTHER | Age: 24
LOS: 1 days | Discharge: HOME | End: 2019-03-03
Attending: OBSTETRICS & GYNECOLOGY | Admitting: FAMILY MEDICINE
Payer: MEDICAID

## 2019-03-03 VITALS — SYSTOLIC BLOOD PRESSURE: 124 MMHG | DIASTOLIC BLOOD PRESSURE: 65 MMHG | TEMPERATURE: 98.1 F

## 2019-03-03 PROBLEM — Z36.89 ENCOUNTER FOR TRIAGE IN PREGNANT PATIENT: Status: ACTIVE | Noted: 2019-03-03

## 2019-03-03 LAB
ALBUMIN UR-MCNC: NEGATIVE MG/DL
AMPHETAMINES UR QL SCN: NOT DETECTED
APPEARANCE UR: CLEAR
BARBITURATES UR QL: NOT DETECTED
BENZODIAZ UR QL: NOT DETECTED
BILIRUB UR QL STRIP: NEGATIVE
BUPRENORPHINE UR QL: NOT DETECTED NG/ML
CANNABINOIDS UR QL: NOT DETECTED NG/ML
COCAINE UR QL: NOT DETECTED
COLOR UR AUTO: YELLOW
D-METHAMPHET UR QL: NOT DETECTED NG/ML
GLUCOSE UR STRIP-MCNC: NEGATIVE MG/DL
HGB UR QL STRIP: NEGATIVE
KETONES UR STRIP-MCNC: NEGATIVE MG/DL
LEUKOCYTE ESTERASE UR QL STRIP: NEGATIVE
METHADONE UR QL SCN: NOT DETECTED
NITRATE UR QL: NEGATIVE
OPIATES UR QL SCN: NOT DETECTED
OXYCODONE UR QL: NOT DETECTED NG/ML
PCP UR QL SCN: NOT DETECTED
PH UR STRIP: 6.5 PH (ref 5–9)
PROPOXYPH UR QL: NOT DETECTED NG/ML
SOURCE: NORMAL
SP GR UR STRIP: <1.005 (ref 1–1.03)
TRICYCLICS UR QL SCN: NOT DETECTED NG/ML
UROBILINOGEN UR STRIP-ACNC: 0.2 EU/DL (ref 0.2–1)

## 2019-03-03 PROCEDURE — G0463 HOSPITAL OUTPT CLINIC VISIT: HCPCS

## 2019-03-03 PROCEDURE — 80307 DRUG TEST PRSMV CHEM ANLYZR: CPT | Performed by: FAMILY MEDICINE

## 2019-03-03 PROCEDURE — 81003 URINALYSIS AUTO W/O SCOPE: CPT | Performed by: FAMILY MEDICINE

## 2019-03-04 NOTE — PROGRESS NOTES
Patient arrived from home at 2020 complaining of upper abdominal pain unsure of what it might be. Patient felt constipated yesterday and had 4 episodes of diarrhea today. Patient has been on her feet at work all day. Patient's urine was collected for lab, fetal monitors applied, assessment completed. Dr. Orellana notified of patient's arrival and condition. Category I tracing, occasional, irregular contractions are noted. Urine results reported to Dr. Orellana. Will encourage oral fluids and continue to monitor.

## 2019-03-04 NOTE — PROGRESS NOTES
Patient is discharged home ambulatory with significant other. Patient had 6 contractions with uterine irritability noted over 1 hour. Pushed oral hydration, contractions subsided. Category I tracing noted. F/U with physician in office.

## 2019-03-04 NOTE — DISCHARGE INSTRUCTIONS
S: Discharge from triage  A: Vital Signs  Temp: 98.1  F (36.7  C)  BP: 124/65        FHTs are category 1. Strip reviewed by ***RN.        Admission on 2019   Component Date Value     Color Urine 2019 Yellow      Appearance Urine 2019 Clear      Glucose Urine 2019 Negative      Bilirubin Urine 2019 Negative      Ketones Urine 2019 Negative      Specific Gravity Urine 2019 <1.005      Blood Urine 2019 Negative      pH Urine 2019 6.5      Protein Albumin Urine 2019 Negative      Urobilinogen Urine 2019 0.2      Nitrite Urine 2019 Negative      Leukocyte Esterase Urine 2019 Negative      Source 2019 Midstream Urine      Amphetamine Qual Urine 2019 Not Detected      Benzodiazepine Qual Urine 2019 Not Detected      Cocaine Qual Urine 2019 Not Detected      Methadone Qual Urine 2019 Not Detected      PCP Qual Urine 2019 Not Detected      Opiates Qualitative Urine 2019 Not Detected      Oxycodone Qualitative Ur* 2019 Not Detected      Propoxyphene Qualitative* 2019 Not Detected      Tricyclic Antidepressant* 2019 Not Detected      Methamphetamine Qualitat* 2019 Not Detected      Barbiturates Qual Urine 2019 Not Detected      Cannabinoids Qualitative* 2019 Not Detected      Buprenorphine Qualitativ* 2019 Not Detected      Dr. Orellana informed of above and discharge order received.   R: Plan includes:  labor instuctions given Patient instructed to return to hospital if any contractions that become progressively stronger or 6 or more per hour, bag of rolon ruptures, bright red vaginal bleeding, little or no baby movement felt or recurrence of sharp abdominal pain to her primary care provider during clinic hours or [facility] at any other time. Patient verbalized understanding of education and agreement with plan. Agrees to call for any problems, questions  or concerns.  Discharged undelivered via ambulatory  in stable condition with all belongings. Accompanied by significant other .

## 2019-03-14 ENCOUNTER — PRENATAL OFFICE VISIT (OUTPATIENT)
Dept: OBGYN | Facility: OTHER | Age: 24
End: 2019-03-14
Attending: OBSTETRICS & GYNECOLOGY
Payer: MEDICAID

## 2019-03-14 ENCOUNTER — HOSPITAL ENCOUNTER (OUTPATIENT)
Dept: ULTRASOUND IMAGING | Facility: OTHER | Age: 24
Discharge: HOME | End: 2019-03-14
Attending: OBSTETRICS & GYNECOLOGY | Admitting: OBSTETRICS & GYNECOLOGY
Payer: MEDICAID

## 2019-03-14 VITALS
BODY MASS INDEX: 55.98 KG/M2 | HEART RATE: 90 BPM | RESPIRATION RATE: 20 BRPM | OXYGEN SATURATION: 97 % | WEIGHT: 293 LBS | SYSTOLIC BLOOD PRESSURE: 128 MMHG | DIASTOLIC BLOOD PRESSURE: 74 MMHG

## 2019-03-14 DIAGNOSIS — O09.93 HIGH-RISK PREGNANCY IN THIRD TRIMESTER: ICD-10-CM

## 2019-03-14 DIAGNOSIS — Z3A.35 35 WEEKS GESTATION OF PREGNANCY: Primary | ICD-10-CM

## 2019-03-14 PROCEDURE — 76816 OB US FOLLOW-UP PER FETUS: CPT

## 2019-03-14 PROCEDURE — 99207 ZZC OB VISIT-NO CHARGE - GICH ONLY: CPT | Performed by: OBSTETRICS & GYNECOLOGY

## 2019-03-14 PROCEDURE — 87077 CULTURE AEROBIC IDENTIFY: CPT | Performed by: OBSTETRICS & GYNECOLOGY

## 2019-03-14 PROCEDURE — G0463 HOSPITAL OUTPT CLINIC VISIT: HCPCS

## 2019-03-14 PROCEDURE — 87081 CULTURE SCREEN ONLY: CPT | Performed by: OBSTETRICS & GYNECOLOGY

## 2019-03-14 NOTE — NURSING NOTE
Chief Complaint   Patient presents with     Prenatal Care     35w 5d     Intermittent cramping for the past week. Was seen in ER 3/3. Was then monitored in [facility].     Medication Reconciliation: complete    Claire Zepeda LPN

## 2019-03-14 NOTE — PROGRESS NOTES
CC: Recheck OB visit at 35w5d    HPI: Simi Godwin presents for a routine OB visit now at 35w5d  She has no concerns. Denies cramping, bleeding, normal fetal movement.    Obstetric History       T1      L1     SAB0   TAB0   Ectopic0   Multiple0   Live Births1       # Outcome Date GA Lbr Ariel/2nd Weight Sex Delivery Anes PTL Lv   2 Current            1 Term 16 40w0d  3.317 kg (7 lb 5 oz) M CS-LVertical Spinal, EPI N CAROLINA      Name: Josh      Complications: Preeclampsia/Hypertension        Current Outpatient Medications   Medication     aspirin 81 MG tablet     blood glucose monitoring (NO BRAND SPECIFIED) meter device kit     Blood Pressure Monitor KIT     omeprazole (PRILOSEC) 20 MG DR capsule     Prenatal MV-Min-Fe Fum-FA-DHA (PRENATAL 1 PO)     No current facility-administered medications for this visit.          O: /74 (BP Location: Right arm, Patient Position: Sitting, Cuff Size: Adult Regular)   Pulse 90   Resp 20   Wt 141.1 kg (311 lb)   LMP 2018 (Exact Date)   SpO2 97%   Breastfeeding? No   BMI 55.98 kg/m    Body mass index is 55.98 kg/m .  See OB flow sheet  EXAM:  NAD  FH: S>D  FHT: 160  Cx: closed  ? beech    Results for orders placed or performed during the hospital encounter of 19   US OB >14 Weeks Follow Up    Narrative    OB ULTRASOUND REPORT     Clinical: , High-risk pregnancy in third trimester.  Comparison: 2018, 2018, 2019  Gestation:  1  Presentation: Transverse  Lie:  Transverse  Cardiac Activity:  Regular  BPM:  160  Movement:  Yes  Placenta: Fundal  ndGndrndanddndend:nd nd2nd Amniotic Fluid: Normal  JACI:  15.2 cm    Measurements:    BPD:  29 weeks 6 days  HC:  31 weeks 1 days  AC:  30 weeks 6 days  FL:  31 weeks 0 days    Estimated Fetal Weight:  1631 grams  HC/AC:  1.06  US age (current):  30 weeks 5 days  Gestational age:  29 weeks 5 days  US EDC (preferred):  19   %WT for EGA (preferred dating):  72 %    Images of through the fetal  kidneys are within normal limits.      Impression    IMPRESSION: Single viable intrauterine pregnancy demonstrating  appropriate interval growth.    MIRANDA CAROLINA MD       A/P: (Z3A.35) 35 weeks gestation of pregnancy  (primary encounter diagnosis)  Comment:   Plan: Beta strep group B r/o culture              Recheck in 2 weeks    Pregnancy risk factors include:  Prior -fetal distress   Body mass index is 53.52 kg/(m^2).   Anesthesia consult for airway, spinal at her BMI- anesthesia eval done.  History of preeclampsia- baby asa daily  S>D clinically, growth scans- normal EFW as of 3/14/19  Gestational HTN  Gestational DM- diet control    Tomer Pereira MD FACOG  1:29 PM 3/14/2019

## 2019-03-15 LAB
BACTERIA SPEC CULT: ABNORMAL
BACTERIA SPEC CULT: ABNORMAL
SPECIMEN SOURCE: ABNORMAL

## 2019-03-25 ENCOUNTER — PRENATAL OFFICE VISIT (OUTPATIENT)
Dept: OBGYN | Facility: OTHER | Age: 24
End: 2019-03-25
Attending: OBSTETRICS & GYNECOLOGY
Payer: MEDICAID

## 2019-03-25 VITALS
TEMPERATURE: 97.1 F | HEIGHT: 63 IN | DIASTOLIC BLOOD PRESSURE: 68 MMHG | BODY MASS INDEX: 51.91 KG/M2 | WEIGHT: 293 LBS | SYSTOLIC BLOOD PRESSURE: 138 MMHG | RESPIRATION RATE: 16 BRPM | HEART RATE: 80 BPM

## 2019-03-25 DIAGNOSIS — Z3A.37 37 WEEKS GESTATION OF PREGNANCY: Primary | ICD-10-CM

## 2019-03-25 PROCEDURE — 99207 ZZC OB VISIT-NO CHARGE - GICH ONLY: CPT | Performed by: OBSTETRICS & GYNECOLOGY

## 2019-03-25 ASSESSMENT — PAIN SCALES - GENERAL: PAINLEVEL: NO PAIN (0)

## 2019-03-25 ASSESSMENT — MIFFLIN-ST. JEOR: SCORE: 2132.3

## 2019-03-25 NOTE — NURSING NOTE
"Chief Complaint   Patient presents with     Prenatal Care     37w 2d        Initial /68 (BP Location: Right arm, Patient Position: Sitting, Cuff Size: Adult Large)   Pulse 80   Temp 97.1  F (36.2  C) (Tympanic)   Resp 16   Ht 1.588 m (5' 2.5\")   Wt 142.6 kg (314 lb 6.4 oz)   LMP 06/29/2018 (Exact Date)   Breastfeeding? No   BMI 56.59 kg/m   Estimated body mass index is 56.59 kg/m  as calculated from the following:    Height as of this encounter: 1.588 m (5' 2.5\").    Weight as of this encounter: 142.6 kg (314 lb 6.4 oz).  Medication Reconciliation: Completed     Saranya Stovall LPN  "

## 2019-03-27 ENCOUNTER — HOSPITAL ENCOUNTER (INPATIENT)
Facility: OTHER | Age: 24
LOS: 3 days | Discharge: HOME | End: 2019-03-31
Attending: FAMILY MEDICINE | Admitting: OBSTETRICS & GYNECOLOGY
Payer: MEDICAID

## 2019-03-27 DIAGNOSIS — Z98.891 S/P CESAREAN SECTION: Primary | ICD-10-CM

## 2019-03-27 PROBLEM — O36.8390 MATERNAL CARE FOR FETAL TACHYCARDIA DURING PREGNANCY: Status: ACTIVE | Noted: 2019-03-27

## 2019-03-27 LAB
A1 MICROGLOB PLACENTAL VAG QL: NEGATIVE
ALBUMIN MFR UR ELPH: 24 MG/DL (ref 1–14)
ALBUMIN UR-MCNC: NEGATIVE MG/DL
AMPHETAMINES UR QL SCN: NOT DETECTED
APPEARANCE UR: CLEAR
BARBITURATES UR QL: NOT DETECTED
BENZODIAZ UR QL: NOT DETECTED
BILIRUB UR QL STRIP: NEGATIVE
BUPRENORPHINE UR QL: NOT DETECTED NG/ML
CANNABINOIDS UR QL: NOT DETECTED NG/ML
COCAINE UR QL: NOT DETECTED
COLOR UR AUTO: YELLOW
CREAT UR-MCNC: 119 MG/DL
D-METHAMPHET UR QL: NOT DETECTED NG/ML
GLUCOSE UR STRIP-MCNC: NEGATIVE MG/DL
HGB UR QL STRIP: NEGATIVE
KETONES UR STRIP-MCNC: ABNORMAL MG/DL
LEUKOCYTE ESTERASE UR QL STRIP: NEGATIVE
METHADONE UR QL SCN: NOT DETECTED
NITRATE UR QL: NEGATIVE
OPIATES UR QL SCN: NOT DETECTED
OXYCODONE UR QL: NOT DETECTED NG/ML
PCP UR QL SCN: NOT DETECTED
PH UR STRIP: 6.5 PH (ref 5–9)
PROPOXYPH UR QL: NOT DETECTED NG/ML
PROT/CREAT 24H UR: 0.2 MG/G{CREAT}
SOURCE: ABNORMAL
SP GR UR STRIP: 1.02 (ref 1–1.03)
TRICYCLICS UR QL SCN: NOT DETECTED NG/ML
UROBILINOGEN UR STRIP-ACNC: 0.2 EU/DL (ref 0.2–1)

## 2019-03-27 PROCEDURE — 80307 DRUG TEST PRSMV CHEM ANLYZR: CPT | Performed by: FAMILY MEDICINE

## 2019-03-27 PROCEDURE — G0463 HOSPITAL OUTPT CLINIC VISIT: HCPCS

## 2019-03-27 PROCEDURE — 84156 ASSAY OF PROTEIN URINE: CPT | Performed by: FAMILY MEDICINE

## 2019-03-27 PROCEDURE — 84112 EVAL AMNIOTIC FLUID PROTEIN: CPT | Performed by: FAMILY MEDICINE

## 2019-03-27 PROCEDURE — G0378 HOSPITAL OBSERVATION PER HR: HCPCS

## 2019-03-27 PROCEDURE — 25800030 ZZH RX IP 258 OP 636: Performed by: FAMILY MEDICINE

## 2019-03-27 PROCEDURE — 81003 URINALYSIS AUTO W/O SCOPE: CPT | Performed by: FAMILY MEDICINE

## 2019-03-27 RX ORDER — SODIUM CHLORIDE, SODIUM LACTATE, POTASSIUM CHLORIDE, CALCIUM CHLORIDE 600; 310; 30; 20 MG/100ML; MG/100ML; MG/100ML; MG/100ML
INJECTION, SOLUTION INTRAVENOUS CONTINUOUS
Status: DISCONTINUED | OUTPATIENT
Start: 2019-03-27 | End: 2019-03-28

## 2019-03-27 RX ORDER — CALCIUM CARBONATE 500 MG/1
1000 TABLET, CHEWABLE ORAL
Status: DISCONTINUED | OUTPATIENT
Start: 2019-03-27 | End: 2019-03-27 | Stop reason: CLARIF

## 2019-03-27 RX ORDER — CALCIUM CARBONATE 500 MG/1
1000 TABLET, CHEWABLE ORAL
Status: DISCONTINUED | OUTPATIENT
Start: 2019-03-27 | End: 2019-03-28

## 2019-03-27 RX ORDER — ACETAMINOPHEN 325 MG/1
650 TABLET ORAL EVERY 4 HOURS PRN
Status: DISCONTINUED | OUTPATIENT
Start: 2019-03-27 | End: 2019-03-28

## 2019-03-27 RX ADMIN — SODIUM CHLORIDE, SODIUM LACTATE, POTASSIUM CHLORIDE, AND CALCIUM CHLORIDE: 600; 310; 30; 20 INJECTION, SOLUTION INTRAVENOUS at 22:03

## 2019-03-27 RX ADMIN — SODIUM CHLORIDE, POTASSIUM CHLORIDE, SODIUM LACTATE AND CALCIUM CHLORIDE 1000 ML: 600; 310; 30; 20 INJECTION, SOLUTION INTRAVENOUS at 20:50

## 2019-03-28 ENCOUNTER — ANESTHESIA EVENT (OUTPATIENT)
Dept: SURGERY | Facility: OTHER | Age: 24
End: 2019-03-28
Payer: MEDICAID

## 2019-03-28 ENCOUNTER — ANESTHESIA (OUTPATIENT)
Dept: SURGERY | Facility: OTHER | Age: 24
End: 2019-03-28
Payer: MEDICAID

## 2019-03-28 ENCOUNTER — APPOINTMENT (OUTPATIENT)
Dept: ULTRASOUND IMAGING | Facility: OTHER | Age: 24
End: 2019-03-28
Attending: FAMILY MEDICINE
Payer: MEDICAID

## 2019-03-28 PROBLEM — O16.3 HYPERTENSION AFFECTING PREGNANCY IN THIRD TRIMESTER: Status: ACTIVE | Noted: 2019-03-28

## 2019-03-28 PROBLEM — Z98.891 S/P CESAREAN SECTION: Status: ACTIVE | Noted: 2019-03-28

## 2019-03-28 LAB
ABO + RH BLD: NORMAL
ABO + RH BLD: NORMAL
ALBUMIN SERPL-MCNC: 3.2 G/DL (ref 3.5–5.7)
ALP SERPL-CCNC: 146 U/L (ref 34–104)
ALT SERPL W P-5'-P-CCNC: 7 U/L (ref 7–52)
ANION GAP SERPL CALCULATED.3IONS-SCNC: 9 MMOL/L (ref 3–14)
AST SERPL W P-5'-P-CCNC: 13 U/L (ref 13–39)
BILIRUB SERPL-MCNC: 0.4 MG/DL (ref 0.3–1)
BLD GP AB SCN SERPL QL: NORMAL
BLOOD BANK CMNT PATIENT-IMP: NORMAL
BUN SERPL-MCNC: 5 MG/DL (ref 7–25)
CALCIUM SERPL-MCNC: 9 MG/DL (ref 8.6–10.3)
CHLORIDE SERPL-SCNC: 104 MMOL/L (ref 98–107)
CO2 SERPL-SCNC: 23 MMOL/L (ref 21–31)
CREAT SERPL-MCNC: 0.43 MG/DL (ref 0.6–1.2)
ERYTHROCYTE [DISTWIDTH] IN BLOOD BY AUTOMATED COUNT: 17.5 % (ref 10–15)
GFR SERPL CREATININE-BSD FRML MDRD: >90 ML/MIN/{1.73_M2}
GLUCOSE SERPL-MCNC: 90 MG/DL (ref 70–105)
HCT VFR BLD AUTO: 31.7 % (ref 35–47)
HGB BLD-MCNC: 9.3 G/DL (ref 11.7–15.7)
MCH RBC QN AUTO: 20.8 PG (ref 26.5–33)
MCHC RBC AUTO-ENTMCNC: 29.3 G/DL (ref 31.5–36.5)
MCV RBC AUTO: 71 FL (ref 78–100)
PLATELET # BLD AUTO: 219 10E9/L (ref 150–450)
POTASSIUM SERPL-SCNC: 3.8 MMOL/L (ref 3.5–5.1)
PROT SERPL-MCNC: 6.3 G/DL (ref 6.4–8.9)
RBC # BLD AUTO: 4.48 10E12/L (ref 3.8–5.2)
SODIUM SERPL-SCNC: 136 MMOL/L (ref 134–144)
SPECIMEN EXP DATE BLD: NORMAL
WBC # BLD AUTO: 9.5 10E9/L (ref 4–11)

## 2019-03-28 PROCEDURE — 37000008 ZZH ANESTHESIA TECHNICAL FEE, 1ST 30 MIN: Performed by: OBSTETRICS & GYNECOLOGY

## 2019-03-28 PROCEDURE — 86901 BLOOD TYPING SEROLOGIC RH(D): CPT | Performed by: OBSTETRICS & GYNECOLOGY

## 2019-03-28 PROCEDURE — 85027 COMPLETE CBC AUTOMATED: CPT | Performed by: OBSTETRICS & GYNECOLOGY

## 2019-03-28 PROCEDURE — 86850 RBC ANTIBODY SCREEN: CPT | Performed by: OBSTETRICS & GYNECOLOGY

## 2019-03-28 PROCEDURE — 59510 CESAREAN DELIVERY: CPT | Performed by: NURSE ANESTHETIST, CERTIFIED REGISTERED

## 2019-03-28 PROCEDURE — G0378 HOSPITAL OBSERVATION PER HR: HCPCS

## 2019-03-28 PROCEDURE — 80053 COMPREHEN METABOLIC PANEL: CPT | Performed by: OBSTETRICS & GYNECOLOGY

## 2019-03-28 PROCEDURE — 25000128 H RX IP 250 OP 636: Performed by: OBSTETRICS & GYNECOLOGY

## 2019-03-28 PROCEDURE — 71000014 ZZH RECOVERY PHASE 1 LEVEL 2 FIRST HR: Performed by: OBSTETRICS & GYNECOLOGY

## 2019-03-28 PROCEDURE — 27210794 ZZH OR GENERAL SUPPLY STERILE: Performed by: OBSTETRICS & GYNECOLOGY

## 2019-03-28 PROCEDURE — 37000009 ZZH ANESTHESIA TECHNICAL FEE, EACH ADDTL 15 MIN: Performed by: OBSTETRICS & GYNECOLOGY

## 2019-03-28 PROCEDURE — 86900 BLOOD TYPING SEROLOGIC ABO: CPT | Performed by: OBSTETRICS & GYNECOLOGY

## 2019-03-28 PROCEDURE — 25800030 ZZH RX IP 258 OP 636: Performed by: NURSE ANESTHETIST, CERTIFIED REGISTERED

## 2019-03-28 PROCEDURE — 25000125 ZZHC RX 250: Performed by: NURSE ANESTHETIST, CERTIFIED REGISTERED

## 2019-03-28 PROCEDURE — 59510 CESAREAN DELIVERY: CPT | Performed by: OBSTETRICS & GYNECOLOGY

## 2019-03-28 PROCEDURE — 25800030 ZZH RX IP 258 OP 636: Performed by: OBSTETRICS & GYNECOLOGY

## 2019-03-28 PROCEDURE — 12000000 ZZH R&B MED SURG/OB

## 2019-03-28 PROCEDURE — 59514 CESAREAN DELIVERY ONLY: CPT | Mod: 80 | Performed by: OBSTETRICS & GYNECOLOGY

## 2019-03-28 PROCEDURE — 25000128 H RX IP 250 OP 636: Performed by: NURSE ANESTHETIST, CERTIFIED REGISTERED

## 2019-03-28 PROCEDURE — 88307 TISSUE EXAM BY PATHOLOGIST: CPT

## 2019-03-28 PROCEDURE — 36415 COLL VENOUS BLD VENIPUNCTURE: CPT | Performed by: OBSTETRICS & GYNECOLOGY

## 2019-03-28 PROCEDURE — 25000132 ZZH RX MED GY IP 250 OP 250 PS 637: Performed by: OBSTETRICS & GYNECOLOGY

## 2019-03-28 PROCEDURE — 36000056 ZZH SURGERY LEVEL 3 1ST 30 MIN: Performed by: OBSTETRICS & GYNECOLOGY

## 2019-03-28 PROCEDURE — 25000125 ZZHC RX 250: Performed by: OBSTETRICS & GYNECOLOGY

## 2019-03-28 PROCEDURE — 36000058 ZZH SURGERY LEVEL 3 EA 15 ADDTL MIN: Performed by: OBSTETRICS & GYNECOLOGY

## 2019-03-28 PROCEDURE — 76819 FETAL BIOPHYS PROFIL W/O NST: CPT

## 2019-03-28 RX ORDER — CARBOPROST TROMETHAMINE 250 UG/ML
250 INJECTION, SOLUTION INTRAMUSCULAR
Status: DISCONTINUED | OUTPATIENT
Start: 2019-03-28 | End: 2019-03-31 | Stop reason: HOSPADM

## 2019-03-28 RX ORDER — HYDROCORTISONE 2.5 %
CREAM (GRAM) TOPICAL 3 TIMES DAILY PRN
Status: DISCONTINUED | OUTPATIENT
Start: 2019-03-28 | End: 2019-03-31 | Stop reason: HOSPADM

## 2019-03-28 RX ORDER — BISACODYL 10 MG
10 SUPPOSITORY, RECTAL RECTAL DAILY PRN
Status: DISCONTINUED | OUTPATIENT
Start: 2019-03-30 | End: 2019-03-31 | Stop reason: HOSPADM

## 2019-03-28 RX ORDER — OXYCODONE AND ACETAMINOPHEN 5; 325 MG/1; MG/1
1-2 TABLET ORAL EVERY 4 HOURS PRN
Status: DISCONTINUED | OUTPATIENT
Start: 2019-03-28 | End: 2019-03-31 | Stop reason: HOSPADM

## 2019-03-28 RX ORDER — MAGNESIUM HYDROXIDE 1200 MG/15ML
LIQUID ORAL PRN
Status: DISCONTINUED | OUTPATIENT
Start: 2019-03-28 | End: 2019-03-28 | Stop reason: HOSPADM

## 2019-03-28 RX ORDER — CITRIC ACID/SODIUM CITRATE 334-500MG
30 SOLUTION, ORAL ORAL
Status: CANCELLED | OUTPATIENT
Start: 2019-03-28

## 2019-03-28 RX ORDER — CEFAZOLIN SODIUM IN 0.9 % NACL 3 G/100 ML
3 INTRAVENOUS SOLUTION, PIGGYBACK (ML) INTRAVENOUS
Status: CANCELLED | OUTPATIENT
Start: 2019-03-28

## 2019-03-28 RX ORDER — ONDANSETRON 2 MG/ML
4 INJECTION INTRAMUSCULAR; INTRAVENOUS EVERY 6 HOURS PRN
Status: DISCONTINUED | OUTPATIENT
Start: 2019-03-28 | End: 2019-03-31 | Stop reason: HOSPADM

## 2019-03-28 RX ORDER — CEFAZOLIN SODIUM 1 G/50ML
1 INJECTION, SOLUTION INTRAVENOUS SEE ADMIN INSTRUCTIONS
Status: CANCELLED | OUTPATIENT
Start: 2019-03-28

## 2019-03-28 RX ORDER — NALOXONE HYDROCHLORIDE 0.4 MG/ML
.1-.4 INJECTION, SOLUTION INTRAMUSCULAR; INTRAVENOUS; SUBCUTANEOUS
Status: DISCONTINUED | OUTPATIENT
Start: 2019-03-28 | End: 2019-03-31 | Stop reason: HOSPADM

## 2019-03-28 RX ORDER — SODIUM CHLORIDE, SODIUM LACTATE, POTASSIUM CHLORIDE, CALCIUM CHLORIDE 600; 310; 30; 20 MG/100ML; MG/100ML; MG/100ML; MG/100ML
INJECTION, SOLUTION INTRAVENOUS CONTINUOUS
Status: CANCELLED | OUTPATIENT
Start: 2019-03-28

## 2019-03-28 RX ORDER — KETOROLAC TROMETHAMINE 30 MG/ML
30 INJECTION, SOLUTION INTRAMUSCULAR; INTRAVENOUS EVERY 6 HOURS
Status: DISPENSED | OUTPATIENT
Start: 2019-03-28 | End: 2019-03-29

## 2019-03-28 RX ORDER — OXYTOCIN 10 [USP'U]/ML
10 INJECTION, SOLUTION INTRAMUSCULAR; INTRAVENOUS
Status: DISCONTINUED | OUTPATIENT
Start: 2019-03-28 | End: 2019-03-31 | Stop reason: HOSPADM

## 2019-03-28 RX ORDER — METHYLERGONOVINE MALEATE 0.2 MG/ML
200 INJECTION INTRAVENOUS
Status: DISCONTINUED | OUTPATIENT
Start: 2019-03-28 | End: 2019-03-31 | Stop reason: HOSPADM

## 2019-03-28 RX ORDER — LIDOCAINE 40 MG/G
CREAM TOPICAL
Status: DISCONTINUED | OUTPATIENT
Start: 2019-03-28 | End: 2019-03-31 | Stop reason: HOSPADM

## 2019-03-28 RX ORDER — HYDROMORPHONE HYDROCHLORIDE 1 MG/ML
.3-.5 INJECTION, SOLUTION INTRAMUSCULAR; INTRAVENOUS; SUBCUTANEOUS EVERY 30 MIN PRN
Status: DISCONTINUED | OUTPATIENT
Start: 2019-03-28 | End: 2019-03-31 | Stop reason: HOSPADM

## 2019-03-28 RX ORDER — FENTANYL CITRATE 50 UG/ML
INJECTION, SOLUTION INTRAMUSCULAR; INTRAVENOUS PRN
Status: DISCONTINUED | OUTPATIENT
Start: 2019-03-28 | End: 2019-03-28

## 2019-03-28 RX ORDER — DOCUSATE SODIUM 100 MG/1
100 CAPSULE, LIQUID FILLED ORAL 2 TIMES DAILY
Status: DISCONTINUED | OUTPATIENT
Start: 2019-03-28 | End: 2019-03-31 | Stop reason: HOSPADM

## 2019-03-28 RX ORDER — DIPHENHYDRAMINE HYDROCHLORIDE 50 MG/ML
25 INJECTION INTRAMUSCULAR; INTRAVENOUS EVERY 6 HOURS PRN
Status: DISCONTINUED | OUTPATIENT
Start: 2019-03-28 | End: 2019-03-31 | Stop reason: HOSPADM

## 2019-03-28 RX ORDER — SIMETHICONE 80 MG
80 TABLET,CHEWABLE ORAL 4 TIMES DAILY PRN
Status: DISCONTINUED | OUTPATIENT
Start: 2019-03-28 | End: 2019-03-31 | Stop reason: HOSPADM

## 2019-03-28 RX ORDER — ONDANSETRON 2 MG/ML
INJECTION INTRAMUSCULAR; INTRAVENOUS PRN
Status: DISCONTINUED | OUTPATIENT
Start: 2019-03-28 | End: 2019-03-28

## 2019-03-28 RX ORDER — LIDOCAINE HYDROCHLORIDE 10 MG/ML
INJECTION, SOLUTION INFILTRATION; PERINEURAL PRN
Status: DISCONTINUED | OUTPATIENT
Start: 2019-03-28 | End: 2019-03-28

## 2019-03-28 RX ORDER — DEXTROSE, SODIUM CHLORIDE, SODIUM LACTATE, POTASSIUM CHLORIDE, AND CALCIUM CHLORIDE 5; .6; .31; .03; .02 G/100ML; G/100ML; G/100ML; G/100ML; G/100ML
INJECTION, SOLUTION INTRAVENOUS CONTINUOUS
Status: DISCONTINUED | OUTPATIENT
Start: 2019-03-28 | End: 2019-03-29

## 2019-03-28 RX ORDER — DIPHENHYDRAMINE HCL 25 MG
25 CAPSULE ORAL EVERY 6 HOURS PRN
Status: DISCONTINUED | OUTPATIENT
Start: 2019-03-28 | End: 2019-03-31 | Stop reason: HOSPADM

## 2019-03-28 RX ORDER — KETOROLAC TROMETHAMINE 30 MG/ML
INJECTION, SOLUTION INTRAMUSCULAR; INTRAVENOUS PRN
Status: DISCONTINUED | OUTPATIENT
Start: 2019-03-28 | End: 2019-03-28

## 2019-03-28 RX ORDER — MORPHINE SULFATE 1 MG/ML
INJECTION, SOLUTION EPIDURAL; INTRATHECAL; INTRAVENOUS PRN
Status: DISCONTINUED | OUTPATIENT
Start: 2019-03-28 | End: 2019-03-28

## 2019-03-28 RX ORDER — LIDOCAINE 40 MG/G
CREAM TOPICAL
Status: CANCELLED | OUTPATIENT
Start: 2019-03-28

## 2019-03-28 RX ORDER — METOCLOPRAMIDE HYDROCHLORIDE 5 MG/ML
10 INJECTION INTRAMUSCULAR; INTRAVENOUS EVERY 6 HOURS PRN
Status: DISCONTINUED | OUTPATIENT
Start: 2019-03-28 | End: 2019-03-31 | Stop reason: HOSPADM

## 2019-03-28 RX ORDER — CEFAZOLIN SODIUM 1 G/3ML
INJECTION, POWDER, FOR SOLUTION INTRAMUSCULAR; INTRAVENOUS PRN
Status: DISCONTINUED | OUTPATIENT
Start: 2019-03-28 | End: 2019-03-28

## 2019-03-28 RX ORDER — LANOLIN 100 %
OINTMENT (GRAM) TOPICAL
Status: DISCONTINUED | OUTPATIENT
Start: 2019-03-28 | End: 2019-03-31 | Stop reason: HOSPADM

## 2019-03-28 RX ORDER — IBUPROFEN 400 MG/1
800 TABLET, FILM COATED ORAL EVERY 6 HOURS PRN
Status: DISCONTINUED | OUTPATIENT
Start: 2019-03-28 | End: 2019-03-31 | Stop reason: HOSPADM

## 2019-03-28 RX ORDER — PANTOPRAZOLE SODIUM 40 MG/1
40 TABLET, DELAYED RELEASE ORAL
Status: DISCONTINUED | OUTPATIENT
Start: 2019-03-28 | End: 2019-03-31 | Stop reason: HOSPADM

## 2019-03-28 RX ORDER — BUPIVACAINE HYDROCHLORIDE 7.5 MG/ML
INJECTION, SOLUTION INTRASPINAL PRN
Status: DISCONTINUED | OUTPATIENT
Start: 2019-03-28 | End: 2019-03-28

## 2019-03-28 RX ORDER — OXYTOCIN 10 [USP'U]/ML
INJECTION, SOLUTION INTRAMUSCULAR; INTRAVENOUS PRN
Status: DISCONTINUED | OUTPATIENT
Start: 2019-03-28 | End: 2019-03-28

## 2019-03-28 RX ADMIN — LIDOCAINE HYDROCHLORIDE 3 ML: 10 INJECTION, SOLUTION INFILTRATION; PERINEURAL at 11:43

## 2019-03-28 RX ADMIN — OXYTOCIN 3 UNITS: 10 INJECTION, SOLUTION INTRAMUSCULAR; INTRAVENOUS at 12:12

## 2019-03-28 RX ADMIN — DOCUSATE SODIUM 100 MG: 100 CAPSULE, LIQUID FILLED ORAL at 21:15

## 2019-03-28 RX ADMIN — KETOROLAC TROMETHAMINE 30 MG: 30 INJECTION, SOLUTION INTRAMUSCULAR at 18:44

## 2019-03-28 RX ADMIN — KETOROLAC TROMETHAMINE 30 MG: 30 INJECTION, SOLUTION INTRAMUSCULAR at 12:53

## 2019-03-28 RX ADMIN — PANTOPRAZOLE SODIUM 40 MG: 40 TABLET, DELAYED RELEASE ORAL at 22:40

## 2019-03-28 RX ADMIN — FENTANYL CITRATE 100 MCG: 50 INJECTION, SOLUTION INTRAMUSCULAR; INTRAVENOUS at 12:20

## 2019-03-28 RX ADMIN — OXYCODONE AND ACETAMINOPHEN 2 TABLET: 5; 325 TABLET ORAL at 15:07

## 2019-03-28 RX ADMIN — OXYTOCIN 3 UNITS: 10 INJECTION, SOLUTION INTRAMUSCULAR; INTRAVENOUS at 12:10

## 2019-03-28 RX ADMIN — BUPIVACAINE HYDROCHLORIDE IN DEXTROSE 2 ML: 7.5 INJECTION, SOLUTION SUBARACHNOID at 11:45

## 2019-03-28 RX ADMIN — Medication 1 ML: at 12:12

## 2019-03-28 RX ADMIN — PHENYLEPHRINE HYDROCHLORIDE 1 MCG/KG/MIN: 10 INJECTION, SOLUTION INTRAMUSCULAR; INTRAVENOUS; SUBCUTANEOUS at 11:52

## 2019-03-28 RX ADMIN — OXYTOCIN 3 UNITS: 10 INJECTION, SOLUTION INTRAMUSCULAR; INTRAVENOUS at 12:14

## 2019-03-28 RX ADMIN — MORPHINE SULFATE 0.1 MG: 1 INJECTION, SOLUTION EPIDURAL; INTRATHECAL; INTRAVENOUS at 11:45

## 2019-03-28 RX ADMIN — ONDANSETRON 4 MG: 2 INJECTION INTRAMUSCULAR; INTRAVENOUS at 12:00

## 2019-03-28 RX ADMIN — OXYTOCIN 3 UNITS: 10 INJECTION, SOLUTION INTRAMUSCULAR; INTRAVENOUS at 12:19

## 2019-03-28 RX ADMIN — CEFAZOLIN 3 G: 1 INJECTION, POWDER, FOR SOLUTION INTRAMUSCULAR; INTRAVENOUS at 11:51

## 2019-03-28 RX ADMIN — OXYTOCIN 100 ML/HR: 10 INJECTION, SOLUTION INTRAMUSCULAR; INTRAVENOUS at 18:48

## 2019-03-28 RX ADMIN — OXYCODONE AND ACETAMINOPHEN 2 TABLET: 5; 325 TABLET ORAL at 21:14

## 2019-03-28 ASSESSMENT — LIFESTYLE VARIABLES: TOBACCO_USE: 1

## 2019-03-28 ASSESSMENT — ACTIVITIES OF DAILY LIVING (ADL)
FALL_HISTORY_WITHIN_LAST_SIX_MONTHS: NO
RETIRED_COMMUNICATION: 0-->UNDERSTANDS/COMMUNICATES WITHOUT DIFFICULTY
BATHING: 0-->INDEPENDENT
TOILETING: 0-->INDEPENDENT
AMBULATION: 0-->INDEPENDENT
TRANSFERRING: 0-->INDEPENDENT
SWALLOWING: 0-->SWALLOWS FOODS/LIQUIDS WITHOUT DIFFICULTY
RETIRED_EATING: 0-->INDEPENDENT
COGNITION: 0 - NO COGNITION ISSUES REPORTED
DRESS: 0-->INDEPENDENT

## 2019-03-28 NOTE — PROGRESS NOTES
Results for orders placed or performed during the hospital encounter of 03/27/19   US fetal biophysical profile without non stress testing    Narrative    US OB FETAL BIOPHY PROFILE W/O NON STRESS SINGLE    History: fetal tachycardia (last evening, improved), maternal obesity,  pregnancy induced hypertension    Fetal Movement:  Score 2: At least 3 discrete body/limb movements in 30 minutes  Score 0: Up to 2 episodes of limb/body movements in 30 minutes                    FM = 2    Fetal Breathing movements:  Score 2: At least one episode, at least 30 seconds duration in 30  minutes of observation.  Score 0: Absent or no episodes of greater than 30 seconds    duration in 30 minutes observation.                    FBM = 2    Fetal Tone:  Score 2: At least one episode of active extension with return to     flexion of fetal limbs or trunk, opening and closing of     hands considered normal tone.  Score 0: Absent or no episodes in 30 minutes of observation.                    FT = 2    Amniotic Fluid Volume:  Score 2: At least one pocket of amniotic fluid measuring at least    1 cm in two perpendicular planes.  Score 0: Either no amniotic fluid or a pocket less than 1 cm in    two perpendicular planes.                    AF = 2                        TOTAL = 8    JACI: 14.4 cm  HRT Rate: 134 bpm  Placenta Location: Fundal  Placenta ndGndrndanddndend:nd nd2nd Position: Transverse with the head towards maternal right      Impression    IMPRESSION:    Normal biophysical profile.    MIRANDA CAROLINA MD   *UA reflex to Microscopic   Result Value Ref Range    Color Urine Yellow     Appearance Urine Clear     Glucose Urine Negative NEG^Negative mg/dL    Bilirubin Urine Negative NEG^Negative    Ketones Urine Trace (A) NEG^Negative mg/dL    Specific Gravity Urine 1.025 1.000 - 1.030    Blood Urine Negative NEG^Negative    pH Urine 6.5 5.0 - 9.0 pH    Protein Albumin Urine Negative NEG^Negative mg/dL    Urobilinogen Urine 0.2 0.2 - 1.0 EU/dL     Nitrite Urine Negative NEG^Negative    Leukocyte Esterase Urine Negative NEG^Negative    Source Midstream Urine    Protein Random Urine with Creat Ratio GH   Result Value Ref Range    Total Protein Random Urine 24 (H) 1 - 14 mg/dL    Protein Total UR MG/G CR 0.20 mg/g[creat]   Creatinine urine calculation only   Result Value Ref Range    Creatinine Urine 119 mg/dL   Drug of Abuse Screen Urine GH   Result Value Ref Range    Amphetamine Qual Urine Not Detected NDET^Not Detected    Benzodiazepine Qual Urine Not Detected NDET^Not Detected    Cocaine Qual Urine Not Detected NDET^Not Detected    Methadone Qual Urine Not Detected NDET^Not Detected    PCP Qual Urine Not Detected NDET^Not Detected    Opiates Qualitative Urine Not Detected NDET^Not Detected    Oxycodone Qualitative Urine Not Detected NDET^Not Detected ng/mL    Propoxyphene Qualitative Urine Not Detected NDET^Not Detected ng/mL    Tricyclic Antidepressants Qual Urine Not Detected NDET^Not Detected ng/mL    Methamphetamine Qualitative Urine Not Detected NDET^Not Detected ng/mL    Barbiturates Qual Urine Not Detected NDET^Not Detected    Cannabinoids Qualitative Urine Not Detected NDET^Not Detected ng/mL    Buprenorphine Qualitative Urine Not Detected NDET^Not Detected ng/mL   CBC with platelets   Result Value Ref Range    WBC 9.5 4.0 - 11.0 10e9/L    RBC Count 4.48 3.8 - 5.2 10e12/L    Hemoglobin 9.3 (L) 11.7 - 15.7 g/dL    Hematocrit 31.7 (L) 35.0 - 47.0 %    MCV 71 (L) 78 - 100 fl    MCH 20.8 (L) 26.5 - 33.0 pg    MCHC 29.3 (L) 31.5 - 36.5 g/dL    RDW 17.5 (H) 10.0 - 15.0 %    Platelet Count 219 150 - 450 10e9/L   Rupture of membranes by Amnisure   Result Value Ref Range    Amnisure Negative NEG^Negative     Plan to proceed to repeat , patient declines tubal. Will have Dr. YUMI Arcos assist due to the high BMI of the patient and associated difficulties.  Discussed risks benefits and alternatives and she wishes to proceed.    Tomer Pereira MD  FACOG  8:44 AM 3/28/2019

## 2019-03-28 NOTE — ANESTHESIA CARE TRANSFER NOTE
Patient: Simi Godwin    Procedure(s):   SECTION    Diagnosis: repeat  Section  Diagnosis Additional Information: No value filed.    Anesthesia Type:   Spinal     Note:  Airway :Room Air  Patient transferred to:PACU  Handoff Report: Identifed the Patient, Identified the Reponsible Provider, Reviewed the pertinent medical history, Discussed the surgical course, Reviewed Intra-OP anesthesia mangement and issues during anesthesia, Set expectations for post-procedure period and Allowed opportunity for questions and acknowledgement of understanding      Vitals: (Last set prior to Anesthesia Care Transfer)    CRNA VITALS  3/28/2019 1212 - 3/28/2019 1258      3/28/2019             Resp Rate (observed):  20    EKG:  NSR                Electronically Signed By: VERÓNICA Ferris CRNA  2019  12:58 PM

## 2019-03-28 NOTE — ANESTHESIA PROCEDURE NOTES
Peripheral nerve/Neuraxial procedure note : intrathecal  Pre-Procedure  Performed by  Mik Laird APRN CRNA   Location: OR    Procedure Times:3/28/2019 11:37 AM and 3/28/2019 11:46 AM  Pre-Anesthestic Checklist: patient identified, IV checked, risks and benefits discussed, informed consent, monitors and equipment checked, pre-op evaluation, at physician/surgeon's request and post-op pain management    Timeout  Correct Patient: Yes   Correct Procedure: Yes   Correct Site: Yes   Correct Laterality: Yes   Correct Position: Yes     .   Procedure Documentation  ASA 2  Diagnosis:Labor Pain.    Procedure:    Intrathecal.  Insertion Site:L3-4  (midline approach)      Patient Prep;mask, sterile gloves, chlorhexidine gluconate and isopropyl alcohol, patient draped.  .  Needle: Frandy tip Spinal Needle (gauge): 25  Spinal/LP Needle Length (inches): 5 # of attempts: 2 and  # of redirects:  1 Introducer used Introducer: 20 G .       Assessment/Narrative  Paresthesias: No.  .  .  clear CSF fluid removed while sitting   . Time Injected: 11:45

## 2019-03-28 NOTE — PROGRESS NOTES
"Pt arrived to unit with chief complaint of \"leaking strange color fluid from vagina when wipe\". Urine collected. Amnisure collected. SVE fingertip. VSS with exception of elevated blood pressure, see flowsheet. Doctor Reyna notified of pt status and added creatine protein ratio to labs. Category 1 tracing. No contractions reports or picked up on monitor.   "

## 2019-03-28 NOTE — OR NURSING
PACU Transfer Note    Simi Godwin remains in [facility] Room 411 after PACU cares. Holding baby. MIGNON Schaeffer in room. Report given to Jacklyn BAUER.  Prescriptions were: None    PACU Respiratory Event Documentation     1) Episodes of Apnea greater than or equal to 10 seconds: No    2) Bradypnea - less than 8 breaths per minute: NO    3) Pain score on 0 to 10 scale: 0    4) Pain-sedation mismatch (yes or no): No    5) Repeated 02 desaturation less than 90% (yes or no): No    Anesthesia notified? (yes or no): No    Any of the above events occuring repeatedly in separate 30 minute intervals may be considered recurrent PACU respiratory events.    Patient stable and meets phase 1 discharge criteria for transport from PACU.

## 2019-03-28 NOTE — PLAN OF CARE
Pt monitor not picking up well. Pt switched to chelsey monitor and category 1 tracing noted with no contractions. Pt going to rest and call light in reach.

## 2019-03-28 NOTE — ANESTHESIA POSTPROCEDURE EVALUATION
Patient: Simi Godwin    Procedure(s):   SECTION    Diagnosis:repeat  Section  Diagnosis Additional Information: No value filed.    Anesthesia Type:  Spinal    Note:  Anesthesia Post Evaluation    Patient location during evaluation: Bedside  Patient participation: Able to fully participate in evaluation  Level of consciousness: awake and alert  Pain management: adequate  Airway patency: patent  Cardiovascular status: acceptable  Respiratory status: acceptable  Hydration status: acceptable  PONV: none     Anesthetic complications: None          Last vitals:  Vitals:    19 1330 19 1335 19 1345   BP: 126/72 123/66    Pulse: 80 83    Resp:      Temp:   (P) 98  F (36.7  C)   SpO2: 98%  98%         Electronically Signed By: VERÓNICA West CRNA  2019  1:55 PM

## 2019-03-28 NOTE — OP NOTE
[facility] Gynecology Operative Note    Pre-operative diagnosis: Hypertension in pregnancy, 37w5d gestation, morbid obesity, history of  section   Post-operative diagnosis: Same   Procedure:  section   Surgeon: Tomer Pereira MD   Assistant(s): Dr. Michelle Arcos at my request due to need for assistance with exposure and her operative expertise.   Anesthesia: Spinal anesthesia   Estimated blood loss: 700ml   Total IV fluids: (See anesthesia record)   Blood transfusion: No transfusion was given during surgery   Total urine output: (See anesthesia record)   Drains: Trevino catheter   Specimens: placenta   Findings: Healthy  female   Complications: None   Condition: Stable   Procedure details: DESCRIPTION OF PROCEDURE:     The patient was transferred to the OR where spinal anesthesia was accomplished.  A trevino catheter was inserted and clear urine was noted.  The abdomen was scrubbed prepped and draped in the usual manner. A Traxi device was placed for exposure.  A hard stop timeout was accomplished.  A transverse Pfannenstiel incision was made and sharp and electrocautery dissection carried down to the fascial layer.  The Fascia was opened in the midline andextended bluntly bilaterally.  The rectus muscles were  in the midline bluntly.  The peritoneum was bluntly divided and the Marline ring retractor was placed.  A bladder flap was made sharply.  The lower uterinesegment was incised sharply in a low transverse fashion and the amniotic sac ruptured bluntly with a finger.  Clear fluid was noted and the incision extended bluntly.  The fetal head was deliverd in the OT position.  The infant received nasal and oropharyngeal suction with the bulb suction. The shoulders delivered atraumatically followed by the remainder of the baby.   The cord wasclamped and cut and the infant handed to the  nurse.   evaluation was accomplished by Dr. Wyman due to the known  risks of  for fetal respiratory status.      The placenta was manually removed and the uterus wiped clear of clots and debris. Pitocin was added to the IV infusion and the uterus was noted to firm-up nicely. The tubes and ovaries appeared normal. The uterine incision was closed in a double layer of #1 Chromic in a running fashion. Hemostasis was adequate.  The abdomen irrigated clear of clots and debris.  The peritoneum and the rectus  muscles were approximated with 0 vicryl.   The fascia was closed with 0 PDS.  The subcutaneous layer was irrigated and made hemastatic with electrocautery.  It was closed in a single layer of 0 vicryl. The skin was closed with Insorb staples.  The wound was dressed with steri-strips and a pressure dressing.  Counts were correct times 2.  The patient and  were transferred to the recovery room in stable condition.    Tomer Pereira MD FACOG  12:49 PM 3/28/2019

## 2019-03-28 NOTE — H&P
[facility]    History and Physical  Obstetrics and Gynecology     Date of Admission:  3/27/2019    Assessment & Plan   Simi Godwin is a 24 year old female who presents with exacerbated hypertension  ASSESSMENT:   IUP @ 37w5d with exacerbated hypertension.  NST reactive.  Category  I    PLAN:   Admit - see IP orders  Prepare for  section due to exacerbation of hypertension at 37w5d    Tomer Pereira    History of Present Illness   Simi Godwin is a 24 year old female  37w5d  Estimated Date of Delivery: 2019 is calculated from Patient's last menstrual period was 2018 (exact date). [facility] with fetal tachycardia and hypertension. She was admitted for observation and given IV fluids. Fetal tracing returned to normal and her hypertension improved. She is again exacerbated at this time in the 150's systolic. Fetal  testing is reassuring this AM. Last PM her urine protein to creatinine was 0.2.    PRENATAL COURSE  Prenatal course was complicated by morbid obesity, history of  section. She was planning on tubal ligation at the time of her . She was evaluated during her pregnancy by anesthesia for appropriateness of anesthetic during pregnancy.      Recent Labs   Lab Test 18  1447   ABO A   RH Pos   AS Neg     Rhogam not indicated   Recent Labs   Lab Test 18  1447   HEPBANG Nonreactive   HIAGAB Nonreactive   RUQIGG 22       Past Medical History    I have reviewed this patient's medical history and updated it with pertinent information if needed.   Past Medical History:   Diagnosis Date     Antepartum hemorrhage 2016     Nicotine dependence, uncomplicated     has quit     Obesity complicating pregnancy 2016     Polyhydramnios      Supraventricular tachycardia (H) 2013     Underimmunization status     2016       Past Surgical History   I have reviewed this patient's surgical  history and updated it with pertinent information if needed.  Past Surgical History:   Procedure Laterality Date     EP ABLATION AVNRT       RELEASE CARPAL TUNNEL       TONSILLECTOMY         Prior to Admission Medications   Prior to Admission Medications   Prescriptions Last Dose Informant Patient Reported? Taking?   Blood Pressure Monitor KIT   No No   Si Units 2 times daily as needed (elevated BP)   Prenatal MV-Min-Fe Fum-FA-DHA (PRENATAL 1 PO)   Yes No   aspirin 81 MG tablet   Yes No   Sig: Take 1 tablet (81 mg) by mouth daily   blood glucose monitoring (NO BRAND SPECIFIED) meter device kit   No No   Sig: Use to test blood sugar 4 times daily or as directed.   omeprazole (PRILOSEC) 20 MG DR capsule   Yes No   Sig: Take 20 mg by mouth      Facility-Administered Medications: None     Allergies   No Known Allergies    Social History   I have reviewed this patient's social history and updated it with pertinent information if needed. Simi Godwin  reports that she quit smoking about 2 months ago. Her smoking use included cigarettes. She has a 0.50 pack-year smoking history. she has never used smokeless tobacco. She reports that she does not drink alcohol or use drugs.    Family History   I have reviewed this patient's family history and updated it with pertinent information if needed.   Family History   Problem Relation Age of Onset     Cancer Paternal Grandfather      Diabetes Maternal Grandfather        Immunization History   Immunizations are up to date    Physical Exam   Temp: 96.3  F (35.7  C)   BP: 97/57     Resp: 16        Vital Signs with Ranges  Temp:  [96.3  F (35.7  C)-98.1  F (36.7  C)] 96.3  F (35.7  C)  Resp:  [16] 16  BP: ()/() 97/57    Abdomen: gravid, single vertex fetus, non-tender, EFW 8 lbs 0    Fetal Heart Tones: 140's baseline, moderate variablility, + accels, no decels and Category I  TOCO:   external monitor shows irregular contractions every 2-4  minutes.    Constitutional: healthy, alert, active and no distress   Respiratory: No increased work of breathing, good air exchange, clear to auscultation bilaterally, no crackles or wheezing  Cardiovascular: Normal apical impulse, regular rate and rhythm, normal S1 and S2, no S3 or S4, and no murmur noted  Skin/Extremites: no rashes and no lesions  Neurologic: Awake, alert, oriented to name, place and time.  Cranial nerves II-XII are grossly intact.

## 2019-03-28 NOTE — H&P
[facility]    History and Physical  Obstetrics and Gynecology     Date of Admission:  3/27/2019    Assessment & Plan   Simi Godwin is a 24 year old female  @ 37w4d who presents with concerns of vaginal discharge that started today.  PIH - on aspirin therapy.  Fetal Tachycardia - uncertain cause.  Mom has a history of SVT s/p EP study and ablation at age 18-19.  Maternal obesity  S/p prior C/S    PLAN:   Admit for observation (if monitoring overnight) or inpatient (if preparing for C/S).  IVF - LR bolus x 1L.  If no resolve of fetal tachycardia, will prepare for C/S delivery.  If improvement in fetal tachycardia - will observe overnight.  PIH - on ASA therapy.  Initial BPs were significantly elevated, greater than 160 systolic and 90 diastolic.  However with bedrest, resolving to 130s-150s systolic.  Maternal obesity - had consult with Mik Laird CRNA on 2019.  No complications with prior C/S.    Faustina Orellana,   Family Practice    History of Present Illness   Simi Godwin is a 24 year old female  37w4d  Estimated Date of Delivery: 19 is calculated from Patient's last menstrual period was 2018 (exact date). [facility]    Initially came in for a yellowish vaginal discharge without odor that started today; she had concerns re: what this was.  She denies any HA, vision changes, SOB, RUQ abdominal pain, difficulty with urination.  She did develop PIH with her last pregnancy around the same gestation and was noted to have elevated readings upon admission today - with now slight improvement.    Most notably - she has had fetal tachycardia; with at times baseline oscilating between 160-180s.  However with variability and accelerations, will reach 190s-200s.  No maternal fever; had URI one week ago but symptoms have resolved within the last one week.  No OTC medication use.  No illegal drug use.  Denies vaginal bleeding.      PRENATAL  COURSE  Prenatal course was complicated by:    Patient Active Problem List    Diagnosis Date Noted     Encounter for triage in pregnant patient 2019     Priority: Medium     History of  2018     Priority: Medium     Nicotine use disorder 2018     Priority: Medium     Failure to progress in labor 2016     Priority: Medium     BMI 50.0-59.9, adult (H) 2016     Priority: Medium     Obesity affecting pregnancy 2016     Priority: Medium         Recent Labs   Lab Test 18  1447   ABO A   RH Pos   AS Neg     Rhogam not indicated   Recent Labs   Lab Test 18  1447   HEPBANG Nonreactive   HIAGAB Nonreactive   RUQIGG 22       Past Medical History    I have reviewed this patient's medical history and updated it with pertinent information if needed.   Past Medical History:   Diagnosis Date     Antepartum hemorrhage     2016     Nicotine dependence, uncomplicated     No Comments Provided     Obesity complicating pregnancy     2016     Polyhydramnios     No Comments Provided     Supraventricular tachycardia (H)          Underimmunization status     2016       Past Surgical History   I have reviewed this patient's surgical history and updated it with pertinent information if needed.  Past Surgical History:   Procedure Laterality Date     EP ABLATION AVNRT       RELEASE CARPAL TUNNEL       TONSILLECTOMY         Prior to Admission Medications   Prior to Admission Medications   Prescriptions Last Dose Informant Patient Reported? Taking?   Blood Pressure Monitor KIT   No No   Si Units 2 times daily as needed (elevated BP)   Prenatal MV-Min-Fe Fum-FA-DHA (PRENATAL 1 PO)   Yes No   aspirin 81 MG tablet   Yes No   Sig: Take 1 tablet (81 mg) by mouth daily   blood glucose monitoring (NO BRAND SPECIFIED) meter device kit   No No   Sig: Use to test blood sugar 4 times daily or as directed.   omeprazole (PRILOSEC) 20 MG DR capsule   Yes No   Sig: Take 20  mg by mouth      Facility-Administered Medications: None     Allergies   No Known Allergies    Social History   I have reviewed this patient's social history and updated it with pertinent information if needed. Simi Godwin  reports that she quit smoking about 2 months ago. Her smoking use included cigarettes. She has a 0.50 pack-year smoking history. she has never used smokeless tobacco. She reports that she does not drink alcohol or use drugs.    Family History   I have reviewed this patient's family history and updated it with pertinent information if needed.   Family History   Problem Relation Age of Onset     Cancer Paternal Grandfather      Diabetes Maternal Grandfather        Immunization History   Immunization History   Administered Date(s) Administered     Influenza Vaccine IM 3yrs+ 4 Valent IIV4 09/30/2016, 09/24/2018     MMR 11/12/2016     TDAP Vaccine (Boostrix) 09/16/2016, 01/17/2019       Physical Exam   Temp: 98.1  F (36.7  C)   BP: 155/74   Vital Signs with Ranges  Temp:  [97.5  F (36.4  C)-98.1  F (36.7  C)] 98.1  F (36.7  C)  BP: (114-175)/() 155/74    Abdomen: gravid, single vertex fetus, non-tender, EFW 7 lbs 2 oz  Cervical Exam: closed/ 30/ high (per nursing)    Fetal Heart Tones: 180 baseline, moderate variablility, + accels, no decels and Category II  TOCO:  None    Constitutional: healthy, alert, active and over weight   Respiratory: No increased work of breathing, good air exchange, clear to auscultation bilaterally, no crackles or wheezing  Cardiovascular: Normal apical impulse, regular rate and rhythm, normal S1 and S2, no S3 or S4, and no murmur noted  Skin/Extremites: normal skin color, texture, turgor  Neurologic: Awake, alert, oriented to name, place and time.  Cranial nerves II-XII are grossly intact.  Neuropsychiatric: General: normal, calm and normal eye contact  Level of consciousness: alert / normal  Affect: normal

## 2019-03-28 NOTE — ANESTHESIA PREPROCEDURE EVALUATION
Anesthesia Pre-Procedure Evaluation    Patient: Simi Godwin   MRN: [id number] : 1995          Preoperative Diagnosis: repeat  Section    Procedure(s):   SECTION    Past Medical History:   Diagnosis Date     Antepartum hemorrhage 2016     Nicotine dependence, uncomplicated     has quit     Obesity complicating pregnancy 2016     Polyhydramnios      Supraventricular tachycardia (H)      Underimmunization status     2016     Past Surgical History:   Procedure Laterality Date     EP ABLATION AVNRT       RELEASE CARPAL TUNNEL       TONSILLECTOMY         Anesthesia Evaluation     . Pt has had prior anesthetic.     No history of anesthetic complications          ROS/MED HX    ENT/Pulmonary:     (+)LEOLA risk factors hypertension, obese, tobacco use, , . .    Neurologic:  - neg neurologic ROS     Cardiovascular: Comment: Hx of SVT with ablation in  per patient. States she hasn't had any rhythm disturbances since ablation.    (+) hypertension----. : . . . :. .       METS/Exercise Tolerance:  >4 METS   Hematologic:  - neg hematologic  ROS       Musculoskeletal:  - neg musculoskeletal ROS       GI/Hepatic:  - neg GI/hepatic ROS   (+) GERD Asymptomatic on medication,       Renal/Genitourinary:  - ROS Renal section negative       Endo:     (+) Obesity, .      Psychiatric:  - neg psychiatric ROS       Infectious Disease:  - neg infectious disease ROS       Malignancy:      - no malignancy   Other:    (+) Possibly pregnant C-spine cleared: N/A, no H/O Chronic Pain,no other significant disability                         Physical Exam  Normal systems: cardiovascular, pulmonary and dental    Airway   Mallampati: I  TM distance: >3 FB  Neck ROM: full    Dental     Cardiovascular   Rhythm and rate: regular and normal      Pulmonary   PE comment: Distant            Lab Results   Component Value Date    WBC 9.5 2019    HGB 9.3 (L) 2019    HCT 31.7 (L) 2019     " 03/28/2019     03/28/2019    POTASSIUM 3.8 03/28/2019    CHLORIDE 104 03/28/2019    CO2 23 03/28/2019    BUN 5 (L) 03/28/2019    CR 0.43 (L) 03/28/2019    GLC 90 03/28/2019    LINDA 9.0 03/28/2019    ALBUMIN 3.2 (L) 03/28/2019    PROTTOTAL 6.3 (L) 03/28/2019    ALT 7 03/28/2019    AST 13 03/28/2019    ALKPHOS 146 (H) 03/28/2019    BILITOTAL 0.4 03/28/2019    BILIDIRECT 0.05 11/08/2016       Preop Vitals  BP Readings from Last 3 Encounters:   03/28/19 97/57   03/25/19 138/68   03/14/19 128/74    Pulse Readings from Last 3 Encounters:   03/25/19 80   03/14/19 90   02/28/19 80      Resp Readings from Last 3 Encounters:   03/27/19 16   03/25/19 16   03/14/19 20    SpO2 Readings from Last 3 Encounters:   03/14/19 97%   07/31/18 99%      Temp Readings from Last 1 Encounters:   03/27/19 96.3  F (35.7  C)    Ht Readings from Last 1 Encounters:   03/25/19 1.588 m (5' 2.5\")      Wt Readings from Last 1 Encounters:   03/25/19 142.6 kg (314 lb 6.4 oz)    Estimated body mass index is 56.59 kg/m  as calculated from the following:    Height as of 3/25/19: 1.588 m (5' 2.5\").    Weight as of 3/25/19: 142.6 kg (314 lb 6.4 oz).       Anesthesia Plan      History & Physical Review      ASA Status:  2 .    NPO Status:  > 6 hours    Plan for Spinal Maintenance will be Balanced.    PONV prophylaxis:  Ondansetron (or other 5HT-3)  Discussed risks, benefits and alternatives and would like to proceed with spinal anesthesia and GETA back-up.       Postoperative Care  Postoperative pain management:  IV analgesics and Neuraxial analgesia.      Consents  Anesthetic plan, risks, benefits and alternatives discussed with:  Patient and Spouse.  Use of blood products discussed: No .   .                 VERÓNICA West CRNA  "

## 2019-03-28 NOTE — PROGRESS NOTES
Patient was brought back to their room, rails were up and call light was within reach.     Handoff procedure information verbally given to MD.

## 2019-03-29 LAB — HGB BLD-MCNC: 7.8 G/DL (ref 11.7–15.7)

## 2019-03-29 PROCEDURE — 25000125 ZZHC RX 250: Performed by: OBSTETRICS & GYNECOLOGY

## 2019-03-29 PROCEDURE — 12000000 ZZH R&B MED SURG/OB

## 2019-03-29 PROCEDURE — 36415 COLL VENOUS BLD VENIPUNCTURE: CPT | Performed by: OBSTETRICS & GYNECOLOGY

## 2019-03-29 PROCEDURE — 25000132 ZZH RX MED GY IP 250 OP 250 PS 637: Performed by: OBSTETRICS & GYNECOLOGY

## 2019-03-29 PROCEDURE — 25000132 ZZH RX MED GY IP 250 OP 250 PS 637: Performed by: FAMILY MEDICINE

## 2019-03-29 PROCEDURE — 85018 HEMOGLOBIN: CPT | Performed by: OBSTETRICS & GYNECOLOGY

## 2019-03-29 PROCEDURE — 99024 POSTOP FOLLOW-UP VISIT: CPT | Performed by: OBSTETRICS & GYNECOLOGY

## 2019-03-29 PROCEDURE — 25000128 H RX IP 250 OP 636: Performed by: OBSTETRICS & GYNECOLOGY

## 2019-03-29 RX ADMIN — OXYCODONE AND ACETAMINOPHEN 2 TABLET: 5; 325 TABLET ORAL at 15:04

## 2019-03-29 RX ADMIN — IBUPROFEN 800 MG: 400 TABLET, FILM COATED ORAL at 12:46

## 2019-03-29 RX ADMIN — FAMOTIDINE 20 MG: 10 INJECTION, SOLUTION INTRAVENOUS at 11:22

## 2019-03-29 RX ADMIN — KETOROLAC TROMETHAMINE 30 MG: 30 INJECTION, SOLUTION INTRAMUSCULAR at 06:28

## 2019-03-29 RX ADMIN — IBUPROFEN 800 MG: 400 TABLET, FILM COATED ORAL at 18:46

## 2019-03-29 RX ADMIN — FAMOTIDINE 20 MG: 20 TABLET ORAL at 23:00

## 2019-03-29 RX ADMIN — PANTOPRAZOLE SODIUM 40 MG: 40 TABLET, DELAYED RELEASE ORAL at 08:23

## 2019-03-29 RX ADMIN — OXYCODONE AND ACETAMINOPHEN 2 TABLET: 5; 325 TABLET ORAL at 03:27

## 2019-03-29 RX ADMIN — OXYCODONE AND ACETAMINOPHEN 2 TABLET: 5; 325 TABLET ORAL at 08:23

## 2019-03-29 RX ADMIN — DOCUSATE SODIUM 100 MG: 100 CAPSULE, LIQUID FILLED ORAL at 11:27

## 2019-03-29 RX ADMIN — DOCUSATE SODIUM 100 MG: 100 CAPSULE, LIQUID FILLED ORAL at 21:02

## 2019-03-29 RX ADMIN — OXYCODONE AND ACETAMINOPHEN 2 TABLET: 5; 325 TABLET ORAL at 20:58

## 2019-03-29 RX ADMIN — KETOROLAC TROMETHAMINE 30 MG: 30 INJECTION, SOLUTION INTRAMUSCULAR at 00:28

## 2019-03-29 RX ADMIN — FAMOTIDINE 20 MG: 10 INJECTION, SOLUTION INTRAVENOUS at 21:03

## 2019-03-29 NOTE — PROGRESS NOTES
Removed traction tape device from patient's ABD, tape was not sticking to patient's skin anymore.

## 2019-03-29 NOTE — PLAN OF CARE
Assessments as charted. B/P: 136/79, T: 98.6, P: 100, R: 18. Rates pain: 8/10 , especially with ambulation. Incision: Healing well, well approximated, without signs of infection and drainage scant and clear, ABD pad in place to absorb moisture under panus. Voiding without difficulty. Fundus firm and midline. Lochia: Light. Activity: unrestricted with out pain.  Ambulates to bathroom independently.  Infant feeding: Formula.     Postpartum care education provided, see Patient Education activity. Patient denies needs. Will monitor.  Joycelyn Randhawa

## 2019-03-29 NOTE — PROGRESS NOTES
[facility]    Obstetrics Post-Op / Progress Note    Assessment & Plan   Assessment:  -1 Day Post-Op  Procedure(s):   SECTION    Doing well.  Clean wound without signs of infection.  No immediate surgical complications identified.    Plan:  Ambulation encouraged  Monitor wound for signs of infection  Pain control measures as needed  Start iron supplementation    Tomer Pereira     Interval History   Doing well.  Pain is well-controlled.  No fevers.  No history of wound drainage, warmth or significant erythema.  Good appetite.  Denies chest pain, shortness of breath, nausea or vomiting.  Ambulatory.  Breastfeeding well.    Medications     - MEDICATION INSTRUCTIONS -       NO Rho (D) immune globulin (RhoGam) needed - mother Rh POSITIVE       - MEDICATION INSTRUCTIONS -       oxytocin in 0.9% NaCl         docusate sodium  100 mg Oral BID     ketorolac  30 mg Intravenous Q6H     pantoprazole  40 mg Oral QAM AC     sodium chloride (PF)  3 mL Intracatheter Q8H       Physical Exam   Temp: 97.6  F (36.4  C) Temp src: Tympanic BP: 147/78 Pulse: 96 Heart Rate: 96 Resp: 18 SpO2: 98 % O2 Device: None (Room air)    There were no vitals filed for this visit.  Vital Signs with Ranges  Temp:  [97.2  F (36.2  C)-98.4  F (36.9  C)] 97.6  F (36.4  C)  Pulse:  [79-96] 96  Heart Rate:  [78-96] 96  Resp:  [16-20] 18  BP: (106-153)/(52-94) 147/78  SpO2:  [97 %-99 %] 98 %  I/O last 3 completed shifts:  In: 1803 [I.V.:1803]  Out: 2550 [Urine:1850; Blood:700]    Uterine fundus is firm, non-tender and at the level of the umbilicus  Incision C/D/I  Extremities Non-tender    Data   Recent Labs   Lab Test 19  0817   ABO A   RH Pos   AS Neg     Recent Labs   Lab Test 19  0500 19  0817   HGB 7.8* 9.3*     Recent Labs   Lab Test 18  1447   RUQIGG 22

## 2019-03-29 NOTE — PLAN OF CARE
Assessments as charted. B/P: 147/78, T: 97.6, P: 96, R: 18. Rates pain: 6/10. Incision: surgical dressing in place . Voiding without difficulty. Fundus firm and midline. Lochia: Light. Activity: normal activity. Infant feeding: Bottle feeding going well.     Postpartum care education provided, see Patient Education activity. Patient denies needs. Will monitor.  Joycelyn Randhawa

## 2019-03-30 PROCEDURE — 99024 POSTOP FOLLOW-UP VISIT: CPT | Performed by: SURGERY

## 2019-03-30 PROCEDURE — 25000132 ZZH RX MED GY IP 250 OP 250 PS 637: Performed by: OBSTETRICS & GYNECOLOGY

## 2019-03-30 PROCEDURE — 12000000 ZZH R&B MED SURG/OB

## 2019-03-30 PROCEDURE — 40000275 ZZH STATISTIC RCP TIME EA 10 MIN

## 2019-03-30 RX ORDER — FAMOTIDINE 20 MG/1
20 TABLET, FILM COATED ORAL 2 TIMES DAILY
Status: DISCONTINUED | OUTPATIENT
Start: 2019-03-30 | End: 2019-03-31 | Stop reason: HOSPADM

## 2019-03-30 RX ADMIN — OXYCODONE AND ACETAMINOPHEN 2 TABLET: 5; 325 TABLET ORAL at 09:10

## 2019-03-30 RX ADMIN — PANTOPRAZOLE SODIUM 40 MG: 40 TABLET, DELAYED RELEASE ORAL at 09:10

## 2019-03-30 RX ADMIN — OXYCODONE AND ACETAMINOPHEN 2 TABLET: 5; 325 TABLET ORAL at 01:26

## 2019-03-30 RX ADMIN — OXYCODONE AND ACETAMINOPHEN 2 TABLET: 5; 325 TABLET ORAL at 21:16

## 2019-03-30 RX ADMIN — DOCUSATE SODIUM 100 MG: 100 CAPSULE, LIQUID FILLED ORAL at 23:05

## 2019-03-30 RX ADMIN — OXYCODONE AND ACETAMINOPHEN 2 TABLET: 5; 325 TABLET ORAL at 05:31

## 2019-03-30 RX ADMIN — SIMETHICONE CHEW TAB 80 MG 80 MG: 80 TABLET ORAL at 09:10

## 2019-03-30 RX ADMIN — IBUPROFEN 800 MG: 400 TABLET, FILM COATED ORAL at 16:09

## 2019-03-30 RX ADMIN — DOCUSATE SODIUM 100 MG: 100 CAPSULE, LIQUID FILLED ORAL at 09:11

## 2019-03-30 RX ADMIN — OXYCODONE AND ACETAMINOPHEN 2 TABLET: 5; 325 TABLET ORAL at 12:46

## 2019-03-30 RX ADMIN — OXYCODONE AND ACETAMINOPHEN 2 TABLET: 5; 325 TABLET ORAL at 17:04

## 2019-03-30 NOTE — PROGRESS NOTES
Incentive Spirometry education completed.  Pt goal 2500 mls.  Pt achieved 2100 mls.  Pt instructed to perform 10/hr while awake with at least one deep breath and cough per hour until able to perform baseline activity.  RT will follow and re-assess as need.      Miroslava Maya, RT on 3/30/2019 at 5:19 PM

## 2019-03-30 NOTE — PROGRESS NOTES
[facility]    Surgical Progress Note  Post Operative Day: 2    Assessment & Plan   Siim Godwin is a 24 year old female who was admitted on 3/27/2019.  done on 3/28 by Dr. Pereira.    Active Problems:      S/P  section    Assessment: POD 2    Plan: continue with post operative and post partum cares.   Discussed pain control-will use ice and percocet. Encouraged mobility.     # Pain Assessment:  Current Pain Score 3/30/2019   Patient currently in pain? -   Pain score (0-10) 8     Code Status: No Order    Karina Sims    Interval History   Doing well. More sore today. Percocet is working, ibuprofen didn't help much. Has used ice and that helped some too. No nausea. Passing flatus, no BM. Not having much bleeding. No chest pain, dizziness or shortness of breath.  -Data reviewed today: I reviewed all new labs and imaging results over the last 24 hours.    Physical Exam   Temp: 98.4  F (36.9  C) Temp src: Oral BP: 121/83 Pulse: 100 Heart Rate: 94 Resp: 20 SpO2: 98 % O2 Device: None (Room air)    There were no vitals filed for this visit.  Vital Signs with Ranges  Temp:  [98.4  F (36.9  C)-98.6  F (37  C)] 98.4  F (36.9  C)  Pulse:  [100] 100  Heart Rate:  [] 94  Resp:  [18-20] 20  BP: (121-136)/(79-83) 121/83  SpO2:  [98 %] 98 %  I/O last 3 completed shifts:  In: 900 [P.O.:900]  Out: 1000 [Urine:1000]    Constitutional: No acute distress, pleasant and cooperative  Respiratory: Clear lungs, no respiratory distress  Cardiovascular: regular rate and rhythm, no murmur. Lower extremity 1+ edema  GI: abdomen soft, appropriate tenderness  Skin/Integument: pink warm and dry, no rash    Medications     - MEDICATION INSTRUCTIONS -       NO Rho (D) immune globulin (RhoGam) needed - mother Rh POSITIVE       - MEDICATION INSTRUCTIONS -       oxytocin in 0.9% NaCl         docusate sodium  100 mg Oral BID     famotidine  20 mg Intravenous BID     pantoprazole  40 mg Oral QAM AC     sodium  chloride (PF)  3 mL Intracatheter Q8H       Data   Recent Labs   Lab 03/29/19  0500 03/28/19  0817   WBC  --  9.5   HGB 7.8* 9.3*   MCV  --  71*   PLT  --  219   NA  --  136   POTASSIUM  --  3.8   CHLORIDE  --  104   CO2  --  23   BUN  --  5*   CR  --  0.43*   ANIONGAP  --  9   LINDA  --  9.0   GLC  --  90   ALBUMIN  --  3.2*   PROTTOTAL  --  6.3*   BILITOTAL  --  0.4   ALKPHOS  --  146*   ALT  --  7   AST  --  13       No results found for this or any previous visit (from the past 24 hour(s)).

## 2019-03-30 NOTE — PROGRESS NOTES
Report given to Ronel, pt medications given, pt given 2 percocet at 2100, pt in stable condition, pt ambulated in hallway,

## 2019-03-31 VITALS
DIASTOLIC BLOOD PRESSURE: 77 MMHG | TEMPERATURE: 98.2 F | HEART RATE: 103 BPM | OXYGEN SATURATION: 98 % | RESPIRATION RATE: 18 BRPM | SYSTOLIC BLOOD PRESSURE: 136 MMHG

## 2019-03-31 PROCEDURE — 25000132 ZZH RX MED GY IP 250 OP 250 PS 637: Performed by: FAMILY MEDICINE

## 2019-03-31 PROCEDURE — 25000132 ZZH RX MED GY IP 250 OP 250 PS 637: Performed by: OBSTETRICS & GYNECOLOGY

## 2019-03-31 PROCEDURE — 99024 POSTOP FOLLOW-UP VISIT: CPT | Performed by: SURGERY

## 2019-03-31 RX ORDER — DOCUSATE SODIUM 100 MG/1
100 CAPSULE, LIQUID FILLED ORAL 2 TIMES DAILY
Qty: 60 CAPSULE | Refills: 0 | Status: SHIPPED | OUTPATIENT
Start: 2019-03-31 | End: 2019-04-11

## 2019-03-31 RX ORDER — OXYCODONE AND ACETAMINOPHEN 5; 325 MG/1; MG/1
1-2 TABLET ORAL EVERY 4 HOURS PRN
Qty: 20 TABLET | Refills: 0 | Status: SHIPPED | OUTPATIENT
Start: 2019-03-31 | End: 2019-04-11

## 2019-03-31 RX ADMIN — FAMOTIDINE 20 MG: 20 TABLET ORAL at 08:55

## 2019-03-31 RX ADMIN — OXYCODONE AND ACETAMINOPHEN 2 TABLET: 5; 325 TABLET ORAL at 08:56

## 2019-03-31 RX ADMIN — OXYCODONE AND ACETAMINOPHEN 2 TABLET: 5; 325 TABLET ORAL at 00:56

## 2019-03-31 RX ADMIN — SIMETHICONE CHEW TAB 80 MG 80 MG: 80 TABLET ORAL at 08:55

## 2019-03-31 RX ADMIN — OXYCODONE AND ACETAMINOPHEN 2 TABLET: 5; 325 TABLET ORAL at 13:14

## 2019-03-31 RX ADMIN — DOCUSATE SODIUM 100 MG: 100 CAPSULE, LIQUID FILLED ORAL at 08:56

## 2019-03-31 RX ADMIN — OXYCODONE AND ACETAMINOPHEN 2 TABLET: 5; 325 TABLET ORAL at 05:16

## 2019-03-31 NOTE — PLAN OF CARE
Assessments as charted. B/P: 136/77, T: 98.2, P: 103, R: 18. Rates pain: see flow sheets for assessments and interventions for pain management.  Incision: Healing well, well approximated and without signs of infection. Voiding without difficulty. Fundus U/1. Lochia: Light. Activity: normal activity. Infant feeding: Formula.   Postpartum care education provided, see Patient Education activity. Patient denies needs. Will monitor.  Renetta Koch RN

## 2019-03-31 NOTE — DISCHARGE SUMMARY
DELIVERY DISCHARGE SUMMARY    Admit date: 3/27/2019  Discharge date: 3/31/2019    Admit Dx:   - 24 year old y/o  at 37w5d  - hypertension exacerbation    Discharge Dx:  - Same as above, s/p low transverse  section    Procedures:  - repeat low transverse  section via Pfannenstiel incision  - Spinal analgesia      Admit HPI:  Ms. Simi Godwin is a 24 year old  at 37w5d who was admitted on 3/27/2019 for exacerbation of hypertension and fetal tahycardia.  Please see her admit H&P for full details of her PMH, PSH, Meds, Allergies and exam on admit.    Hospital course:  After discussion of risk and and benefits and signing informed consent the patient was taken to the operating room for repeat  section.    Her postoperative course was uncomplicated. On POD# 3, she was meeting all of her postpartum goals and deemed stable for discharge. She was voiding without difficulty, tolerating a regular diet without nausea and vomiting, her pain was well controlled on oral pain medicines and her lochia was appropriate.    Discharge Medications:  See AVS    Discharge/Disposition:  Simi Godwin was discharged to home in stable condition with the following instructions/medications:  1) Call for temperature > 100.4, bright red vaginal bleeding >1 pad an hour x 2 hours, foul smelling vaginal discharge, pain not controlled by usual oral pain meds, persistent nausea and vomiting not controlled on medications, drainage or redness from incision site  2) She declined tubal and will discuss contraception.  3) For feeding she decided to with Dr. Pereira.  4) She was instructed to follow-up with Dr. Pereira in 6 weeks for a routine postpartum visit and with him is about 2 weeks for post operative check.  5) Discharge activity:  No heavy lifting >10 lbs or strenuous activity for 6 weeks, pelvic rest for 6 weeks, no driving or operating machinery while on narcotics (2 weeks).

## 2019-03-31 NOTE — PROGRESS NOTES
NSG DISCHARGE NOTE    Patient discharged to home at 1445 PM via ambulation. Accompanied by spouse and staff. Discharge instructions reviewed with patient, opportunity offered to ask questions. Prescriptions sent to patients preferred pharmacy, narcotic script and all belongings sent with patient.     Shirley Levy

## 2019-04-11 ENCOUNTER — OFFICE VISIT (OUTPATIENT)
Dept: OBGYN | Facility: OTHER | Age: 24
End: 2019-04-11
Attending: OBSTETRICS & GYNECOLOGY
Payer: MEDICAID

## 2019-04-11 VITALS
WEIGHT: 285.5 LBS | BODY MASS INDEX: 51.39 KG/M2 | RESPIRATION RATE: 16 BRPM | DIASTOLIC BLOOD PRESSURE: 90 MMHG | HEART RATE: 94 BPM | SYSTOLIC BLOOD PRESSURE: 126 MMHG | TEMPERATURE: 98.4 F

## 2019-04-11 DIAGNOSIS — Z98.890 POSTOPERATIVE STATE: Primary | ICD-10-CM

## 2019-04-11 PROCEDURE — 99024 POSTOP FOLLOW-UP VISIT: CPT | Performed by: OBSTETRICS & GYNECOLOGY

## 2019-04-11 ASSESSMENT — PAIN SCALES - GENERAL: PAINLEVEL: NO PAIN (0)

## 2019-04-11 ASSESSMENT — PATIENT HEALTH QUESTIONNAIRE - PHQ9: SUM OF ALL RESPONSES TO PHQ QUESTIONS 1-9: 0

## 2019-04-11 NOTE — PROGRESS NOTES
Simi Godwin presents todayfor a postop checkup at 2 weeks after repeat  section.    S: Bowels and bladder are functioning normally, no abnormal bleeding or pain. No concerns about the incision(s).    Current Outpatient Medications   Medication     aspirin 81 MG tablet     Blood Pressure Monitor KIT     omeprazole (PRILOSEC) 20 MG DR capsule     Prenatal MV-Min-Fe Fum-FA-DHA (PRENATAL 1 PO)     No current facility-administered medications for this visit.      No Known Allergies  Past Medical History:   Diagnosis Date     Antepartum hemorrhage 2016     Nicotine dependence, uncomplicated     has quit     Obesity complicating pregnancy 2016     Polyhydramnios      Supraventricular tachycardia (H)      Underimmunization status     2016     Past Surgical History:   Procedure Laterality Date      SECTION N/A 3/28/2019    Procedure:  SECTION;  Surgeon: Tomer Pereira MD;  Location: [facility] OR     EP ABLATION AVNRT       RELEASE CARPAL TUNNEL       TONSILLECTOMY         COMPLETE REVIEW OF SYSTEMS: neg        O: /90 (BP Location: Right arm)   Pulse 94   Temp 98.4  F (36.9  C)   Resp 16   Wt 129.5 kg (285 lb 8 oz)   LMP 2018 (Exact Date)   Breastfeeding? No   BMI 51.39 kg/m   Body mass index is 51.39 kg/m .    EXAM:  Abdomen: NT, ND, incision is superficially  about 1x2 cm on left.    I/P:  Doing well  Recheck in a month  Continue with wound cares. No sign of infection today.    Tomer Pereira MD FACOG  11:38 AM 2019

## 2020-01-17 ENCOUNTER — OFFICE VISIT (OUTPATIENT)
Dept: FAMILY MEDICINE | Facility: OTHER | Age: 25
End: 2020-01-17
Attending: NURSE PRACTITIONER
Payer: COMMERCIAL

## 2020-01-17 ENCOUNTER — APPOINTMENT (OUTPATIENT)
Dept: GENERAL RADIOLOGY | Facility: OTHER | Age: 25
End: 2020-01-17
Attending: PHYSICIAN ASSISTANT
Payer: COMMERCIAL

## 2020-01-17 ENCOUNTER — HOSPITAL ENCOUNTER (EMERGENCY)
Facility: OTHER | Age: 25
Discharge: HOME | End: 2020-01-17
Attending: PHYSICIAN ASSISTANT | Admitting: PHYSICIAN ASSISTANT
Payer: COMMERCIAL

## 2020-01-17 ENCOUNTER — TELEPHONE (OUTPATIENT)
Dept: FAMILY MEDICINE | Facility: OTHER | Age: 25
End: 2020-01-17

## 2020-01-17 VITALS
DIASTOLIC BLOOD PRESSURE: 82 MMHG | OXYGEN SATURATION: 97 % | HEIGHT: 62 IN | RESPIRATION RATE: 23 BRPM | HEART RATE: 106 BPM | TEMPERATURE: 101 F | BODY MASS INDEX: 53.92 KG/M2 | WEIGHT: 293 LBS | SYSTOLIC BLOOD PRESSURE: 147 MMHG

## 2020-01-17 VITALS
HEART RATE: 118 BPM | OXYGEN SATURATION: 96 % | HEIGHT: 62 IN | RESPIRATION RATE: 20 BRPM | TEMPERATURE: 100.9 F | WEIGHT: 293 LBS | SYSTOLIC BLOOD PRESSURE: 136 MMHG | DIASTOLIC BLOOD PRESSURE: 80 MMHG | BODY MASS INDEX: 53.92 KG/M2

## 2020-01-17 DIAGNOSIS — J10.1 INFLUENZA A: ICD-10-CM

## 2020-01-17 DIAGNOSIS — R19.5 LOOSE STOOLS: ICD-10-CM

## 2020-01-17 DIAGNOSIS — R50.9 FEVER: ICD-10-CM

## 2020-01-17 DIAGNOSIS — R00.2 PALPITATIONS: ICD-10-CM

## 2020-01-17 DIAGNOSIS — R50.9 FEVER IN ADULT: ICD-10-CM

## 2020-01-17 DIAGNOSIS — Z20.828 EXPOSURE TO INFLUENZA: Primary | ICD-10-CM

## 2020-01-17 DIAGNOSIS — R11.2 NAUSEA WITH VOMITING: ICD-10-CM

## 2020-01-17 DIAGNOSIS — R06.02 SOB (SHORTNESS OF BREATH): ICD-10-CM

## 2020-01-17 DIAGNOSIS — R00.0 TACHYCARDIA: ICD-10-CM

## 2020-01-17 DIAGNOSIS — E86.0 DEHYDRATION: ICD-10-CM

## 2020-01-17 LAB
ALBUMIN SERPL-MCNC: 4.4 G/DL (ref 3.5–5.7)
ALP SERPL-CCNC: 56 U/L (ref 34–104)
ALT SERPL W P-5'-P-CCNC: 21 U/L (ref 7–52)
ANION GAP SERPL CALCULATED.3IONS-SCNC: 11 MMOL/L (ref 3–14)
AST SERPL W P-5'-P-CCNC: 20 U/L (ref 13–39)
BASOPHILS # BLD AUTO: 0 10E9/L (ref 0–0.2)
BASOPHILS NFR BLD AUTO: 0.3 %
BILIRUB SERPL-MCNC: 0.3 MG/DL (ref 0.3–1)
BUN SERPL-MCNC: 10 MG/DL (ref 7–25)
CALCIUM SERPL-MCNC: 9.6 MG/DL (ref 8.6–10.3)
CHLORIDE SERPL-SCNC: 103 MMOL/L (ref 98–107)
CO2 SERPL-SCNC: 23 MMOL/L (ref 21–31)
CREAT SERPL-MCNC: 0.64 MG/DL (ref 0.6–1.2)
D DIMER PPP DDU-MCNC: 218 NG/ML D-DU (ref 0–230)
DIFFERENTIAL METHOD BLD: ABNORMAL
EOSINOPHIL # BLD AUTO: 0 10E9/L (ref 0–0.7)
EOSINOPHIL NFR BLD AUTO: 0.4 %
ERYTHROCYTE [DISTWIDTH] IN BLOOD BY AUTOMATED COUNT: 19.3 % (ref 10–15)
FLUAV+FLUBV RNA SPEC QL NAA+PROBE: NEGATIVE
FLUAV+FLUBV RNA SPEC QL NAA+PROBE: POSITIVE
GFR SERPL CREATININE-BSD FRML MDRD: >90 ML/MIN/{1.73_M2}
GLUCOSE SERPL-MCNC: 123 MG/DL (ref 70–105)
HCT VFR BLD AUTO: 41.1 % (ref 35–47)
HGB BLD-MCNC: 12.8 G/DL (ref 11.7–15.7)
IMM GRANULOCYTES # BLD: 0 10E9/L (ref 0–0.4)
IMM GRANULOCYTES NFR BLD: 0.1 %
INR PPP: 1.33 (ref 0–1.3)
LACTATE SERPL-SCNC: 1.1 MMOL/L (ref 0.5–2.2)
LYMPHOCYTES # BLD AUTO: 0.5 10E9/L (ref 0.8–5.3)
LYMPHOCYTES NFR BLD AUTO: 7.1 %
MCH RBC QN AUTO: 22.9 PG (ref 26.5–33)
MCHC RBC AUTO-ENTMCNC: 31.1 G/DL (ref 31.5–36.5)
MCV RBC AUTO: 74 FL (ref 78–100)
MONOCYTES # BLD AUTO: 0.6 10E9/L (ref 0–1.3)
MONOCYTES NFR BLD AUTO: 8.1 %
NEUTROPHILS # BLD AUTO: 5.7 10E9/L (ref 1.6–8.3)
NEUTROPHILS NFR BLD AUTO: 84 %
PLATELET # BLD AUTO: 246 10E9/L (ref 150–450)
POTASSIUM SERPL-SCNC: 4 MMOL/L (ref 3.5–5.1)
PROT SERPL-MCNC: 7.3 G/DL (ref 6.4–8.9)
RBC # BLD AUTO: 5.59 10E12/L (ref 3.8–5.2)
RSV RNA SPEC NAA+PROBE: NEGATIVE
SODIUM SERPL-SCNC: 137 MMOL/L (ref 134–144)
SPECIMEN SOURCE: ABNORMAL
SPECIMEN SOURCE: NORMAL
STREP GROUP A PCR: NOT DETECTED
TSH SERPL DL<=0.05 MIU/L-ACNC: 0.87 IU/ML (ref 0.34–5.6)
WBC # BLD AUTO: 6.8 10E9/L (ref 4–11)

## 2020-01-17 PROCEDURE — 80053 COMPREHEN METABOLIC PANEL: CPT | Performed by: EMERGENCY MEDICINE

## 2020-01-17 PROCEDURE — 25800030 ZZH RX IP 258 OP 636: Performed by: PHYSICIAN ASSISTANT

## 2020-01-17 PROCEDURE — 83605 ASSAY OF LACTIC ACID: CPT | Performed by: EMERGENCY MEDICINE

## 2020-01-17 PROCEDURE — 36415 COLL VENOUS BLD VENIPUNCTURE: CPT | Mod: XU | Performed by: PHYSICIAN ASSISTANT

## 2020-01-17 PROCEDURE — 36415 COLL VENOUS BLD VENIPUNCTURE: CPT | Performed by: EMERGENCY MEDICINE

## 2020-01-17 PROCEDURE — 87040 BLOOD CULTURE FOR BACTERIA: CPT | Mod: 91 | Performed by: PHYSICIAN ASSISTANT

## 2020-01-17 PROCEDURE — 87651 STREP A DNA AMP PROBE: CPT | Performed by: PHYSICIAN ASSISTANT

## 2020-01-17 PROCEDURE — 99283 EMERGENCY DEPT VISIT LOW MDM: CPT | Mod: Z6 | Performed by: PHYSICIAN ASSISTANT

## 2020-01-17 PROCEDURE — 85610 PROTHROMBIN TIME: CPT | Performed by: PHYSICIAN ASSISTANT

## 2020-01-17 PROCEDURE — 84443 ASSAY THYROID STIM HORMONE: CPT | Performed by: PHYSICIAN ASSISTANT

## 2020-01-17 PROCEDURE — 93005 ELECTROCARDIOGRAM TRACING: CPT | Performed by: PHYSICIAN ASSISTANT

## 2020-01-17 PROCEDURE — 99207 ZZC NO CHARGE LOS: CPT | Performed by: NURSE PRACTITIONER

## 2020-01-17 PROCEDURE — 96374 THER/PROPH/DIAG INJ IV PUSH: CPT | Performed by: PHYSICIAN ASSISTANT

## 2020-01-17 PROCEDURE — 99285 EMERGENCY DEPT VISIT HI MDM: CPT | Mod: 25 | Performed by: PHYSICIAN ASSISTANT

## 2020-01-17 PROCEDURE — 25000132 ZZH RX MED GY IP 250 OP 250 PS 637: Performed by: PHYSICIAN ASSISTANT

## 2020-01-17 PROCEDURE — 85379 FIBRIN DEGRADATION QUANT: CPT | Performed by: PHYSICIAN ASSISTANT

## 2020-01-17 PROCEDURE — G0463 HOSPITAL OUTPT CLINIC VISIT: HCPCS

## 2020-01-17 PROCEDURE — 93010 ELECTROCARDIOGRAM REPORT: CPT | Performed by: INTERNAL MEDICINE

## 2020-01-17 PROCEDURE — 85025 COMPLETE CBC W/AUTO DIFF WBC: CPT | Performed by: EMERGENCY MEDICINE

## 2020-01-17 PROCEDURE — 71046 X-RAY EXAM CHEST 2 VIEWS: CPT

## 2020-01-17 PROCEDURE — 25000128 H RX IP 250 OP 636: Performed by: PHYSICIAN ASSISTANT

## 2020-01-17 PROCEDURE — 87631 RESP VIRUS 3-5 TARGETS: CPT | Performed by: PHYSICIAN ASSISTANT

## 2020-01-17 RX ORDER — ONDANSETRON 4 MG/1
4 TABLET, ORALLY DISINTEGRATING ORAL EVERY 8 HOURS PRN
Qty: 20 TABLET | Refills: 0 | Status: SHIPPED | OUTPATIENT
Start: 2020-01-17 | End: 2020-03-30

## 2020-01-17 RX ORDER — LOPERAMIDE HYDROCHLORIDE 2 MG/1
2 TABLET ORAL 4 TIMES DAILY PRN
Qty: 30 TABLET | Refills: 0 | Status: SHIPPED | OUTPATIENT
Start: 2020-01-17 | End: 2020-03-30

## 2020-01-17 RX ORDER — OSELTAMIVIR PHOSPHATE 75 MG/1
75 CAPSULE ORAL 2 TIMES DAILY
Qty: 10 CAPSULE | Refills: 0 | Status: SHIPPED | OUTPATIENT
Start: 2020-01-17 | End: 2020-03-30

## 2020-01-17 RX ORDER — ACETAMINOPHEN 500 MG
500 TABLET ORAL ONCE
Status: COMPLETED | OUTPATIENT
Start: 2020-01-17 | End: 2020-01-17

## 2020-01-17 RX ORDER — OSELTAMIVIR PHOSPHATE 75 MG/1
75 CAPSULE ORAL ONCE
Status: COMPLETED | OUTPATIENT
Start: 2020-01-17 | End: 2020-01-17

## 2020-01-17 RX ORDER — SODIUM CHLORIDE 9 MG/ML
1000 INJECTION, SOLUTION INTRAVENOUS CONTINUOUS
Status: DISCONTINUED | OUTPATIENT
Start: 2020-01-17 | End: 2020-01-17 | Stop reason: HOSPADM

## 2020-01-17 RX ORDER — KETOROLAC TROMETHAMINE 30 MG/ML
30 INJECTION, SOLUTION INTRAMUSCULAR; INTRAVENOUS ONCE
Status: COMPLETED | OUTPATIENT
Start: 2020-01-17 | End: 2020-01-17

## 2020-01-17 RX ADMIN — OSELTAMIVIR PHOSPHATE 75 MG: 75 CAPSULE ORAL at 14:05

## 2020-01-17 RX ADMIN — ACETAMINOPHEN 500 MG: 500 TABLET, FILM COATED ORAL at 14:05

## 2020-01-17 RX ADMIN — SODIUM CHLORIDE 1000 ML: 9 INJECTION, SOLUTION INTRAVENOUS at 13:26

## 2020-01-17 RX ADMIN — KETOROLAC TROMETHAMINE 30 MG: 30 INJECTION, SOLUTION INTRAMUSCULAR at 13:25

## 2020-01-17 ASSESSMENT — ENCOUNTER SYMPTOMS
FREQUENCY: 0
DYSURIA: 0
SHORTNESS OF BREATH: 1
EYE PAIN: 0
TROUBLE SWALLOWING: 0
SPEECH DIFFICULTY: 0
SEIZURES: 0
LIGHT-HEADEDNESS: 0
BACK PAIN: 0
SORE THROAT: 0
FACIAL ASYMMETRY: 0
VOMITING: 1
WEAKNESS: 1
FATIGUE: 0
TREMORS: 0
NAUSEA: 1
FEVER: 1
ABDOMINAL PAIN: 0
ACTIVITY CHANGE: 0
WHEEZING: 0
DIZZINESS: 0
CHEST TIGHTNESS: 0
NECK PAIN: 0
RHINORRHEA: 0
STRIDOR: 0
DIARRHEA: 1
APPETITE CHANGE: 1
CONSTIPATION: 0
HEADACHES: 0
COLOR CHANGE: 0
NECK STIFFNESS: 0
CHILLS: 1
FACIAL SWELLING: 0
COUGH: 1

## 2020-01-17 ASSESSMENT — MIFFLIN-ST. JEOR
SCORE: 2082.3
SCORE: 2081.72

## 2020-01-17 ASSESSMENT — PAIN SCALES - GENERAL: PAINLEVEL: MODERATE PAIN (5)

## 2020-01-17 NOTE — TELEPHONE ENCOUNTER
"Per clinic policy, Pt cannot be triaged by clinic triage RN, as per chart review, Pt has not been seen by primary care provider at [facility], within the past 3 years.  LOV: prenatal appointment with Dr. Orellana on 9/16/16.    Attempted reaching Patient at number listed:  [phone]. Message states, \"To receive 25 dining dollars, take our survey, if you are 18 or older, press one for your retail rebate voucher...\"    Home/Mobile number: [phone] is invalid.    Writer will wait for Patient to return call. Per clinic policy, it would be recommended that Patient make appointment or be seen in [facility] or ED. Miguelina Newman RN .............. 1/17/2020  11:16 AM        "

## 2020-01-17 NOTE — ED PROVIDER NOTES
History     Chief Complaint   Patient presents with     Nausea, Vomiting, & Diarrhea     Shortness of Breath     Palpitations     This is a 24-year-old female who has had fever chills cough with nausea vomiting and diarrhea over the past 2 days.  She has been feeling some palpitations as well.  She has body aches and flulike symptoms.  She is here for further evaluation at this time she does have a history of SVT in the past.  She denies any chest pain.  Her temp is noted to be 101, and a pulse of 115.          Allergies:  No Known Allergies    Problem List:    Patient Active Problem List    Diagnosis Date Noted     Hypertension affecting pregnancy in third trimester 2019     Priority: Medium     S/P  section 2019     Priority: Medium     Maternal care for fetal tachycardia during pregnancy 2019     Priority: Medium     Encounter for triage in pregnant patient 2019     Priority: Medium     History of  2018     Priority: Medium     Nicotine use disorder 2018     Priority: Medium     Failure to progress in labor 2016     Priority: Medium     BMI 50.0-59.9, adult (H) 2016     Priority: Medium     Obesity affecting pregnancy 2016     Priority: Medium        Past Medical History:    Past Medical History:   Diagnosis Date     Antepartum hemorrhage 2016     Nicotine dependence, uncomplicated      Obesity complicating pregnancy 2016     Polyhydramnios      Supraventricular tachycardia (H)      Underimmunization status        Past Surgical History:    Past Surgical History:   Procedure Laterality Date      SECTION N/A 3/28/2019    Procedure:  SECTION;  Surgeon: Tomer Pereira MD;  Location: [facility] OR     EP ABLATION AVNRT       RELEASE CARPAL TUNNEL       TONSILLECTOMY         Family History:    Family History   Problem Relation Age of Onset     Cancer Paternal Grandfather      Diabetes Maternal Grandfather   "      Social History:  Marital Status:  Single [1]  Social History     Tobacco Use     Smoking status: Former Smoker     Packs/day: 0.25     Years: 2.00     Pack years: 0.50     Types: Cigarettes     Last attempt to quit: 2019     Years since quittin.9     Smokeless tobacco: Never Used     Tobacco comment: 1-3 a day   Substance Use Topics     Alcohol use: No     Alcohol/week: 0.0 standard drinks     Comment: occasional     Drug use: No     Comment: Drug use: No        Medications:    aspirin 81 MG tablet  Blood Pressure Monitor KIT  omeprazole (PRILOSEC) 20 MG DR capsule          Review of Systems   Constitutional: Positive for appetite change, chills and fever. Negative for activity change and fatigue.   HENT: Negative for drooling, facial swelling, rhinorrhea, sore throat and trouble swallowing.    Eyes: Negative for pain and visual disturbance.   Respiratory: Positive for cough and shortness of breath. Negative for chest tightness, wheezing and stridor.    Cardiovascular: Negative for chest pain and leg swelling.   Gastrointestinal: Positive for diarrhea, nausea and vomiting. Negative for abdominal pain and constipation.   Genitourinary: Negative for dysuria, frequency and urgency.   Musculoskeletal: Negative for back pain, neck pain and neck stiffness.   Skin: Negative for color change.   Neurological: Positive for weakness. Negative for dizziness, tremors, seizures, facial asymmetry, speech difficulty, light-headedness and headaches.       Physical Exam   BP: (!) 164/103  Pulse: 115  Temp: 101  F (38.3  C)  Resp: 22  Height: 157.5 cm (5' 2\")  Weight: 138.3 kg (305 lb)  SpO2: 98 %  Vitals:    20 1225 20 1230 20 1245 20 1300   BP: (!) 164/103 (!) 161/96 (!) 147/82    Pulse: 115  106    Resp: 22  16 23   Temp: 101  F (38.3  C)      TempSrc: Tympanic      SpO2: 98% 97%     Weight: 138.3 kg (305 lb)      Height: 1.575 m (5' 2\")            Physical Exam  Constitutional:       General: " She is not in acute distress.     Appearance: She is well-developed. She is ill-appearing. She is not toxic-appearing or diaphoretic.   HENT:      Head: Normocephalic and atraumatic. No raccoon eyes or Ward's sign.      Jaw: No trismus.      Right Ear: No drainage or tenderness.      Left Ear: No drainage or tenderness.      Nose: Nose normal.   Eyes:      General: No scleral icterus.     Extraocular Movements: Extraocular movements intact.      Right eye: Normal extraocular motion and no nystagmus.      Left eye: Normal extraocular motion and no nystagmus.      Conjunctiva/sclera: Conjunctivae normal.      Pupils: Pupils are equal, round, and reactive to light.      Right eye: Pupil is reactive and not sluggish.      Left eye: Pupil is reactive and not sluggish.      Funduscopic exam:     Right eye: No AV nicking, arteriolar narrowing or papilledema. Red reflex present.         Left eye: No AV nicking, arteriolar narrowing or papilledema. Red reflex present.  Neck:      Musculoskeletal: Normal range of motion and neck supple. Normal range of motion. No neck rigidity, pain with movement, spinous process tenderness or muscular tenderness.      Vascular: No JVD.      Trachea: No tracheal deviation.   Cardiovascular:      Rate and Rhythm: Normal rate and regular rhythm.   Pulmonary:      Effort: Pulmonary effort is normal. No respiratory distress.      Breath sounds: No stridor. Wheezing present.      Comments: Lung sounds with  Noted chest congestion.  SaO2 is 98% on room air.  She does not appear to be in any respiratory distress.  Abdominal:      General: There is no distension.      Palpations: Abdomen is soft. There is no mass.      Tenderness: There is no abdominal tenderness. There is no right CVA tenderness, left CVA tenderness, guarding or rebound.   Musculoskeletal: Normal range of motion.         General: No tenderness or deformity.   Lymphadenopathy:      Cervical: No cervical adenopathy.      Right  cervical: No superficial cervical adenopathy.     Left cervical: No superficial cervical adenopathy.   Skin:     General: Skin is warm and dry.      Capillary Refill: Capillary refill takes less than 2 seconds.      Findings: No rash.   Neurological:      General: No focal deficit present.      Mental Status: She is alert and oriented to person, place, and time.      GCS: GCS eye subscore is 4. GCS verbal subscore is 5. GCS motor subscore is 6.      Motor: No tremor or seizure activity.      Coordination: Coordination normal.      Gait: Gait normal.   Psychiatric:         Mood and Affect: Mood normal.         Behavior: Behavior normal.         ED Course     EKG shows sinus tachycardia with a heart rate of 113.      Results for orders placed or performed during the hospital encounter of 01/17/20 (from the past 24 hour(s))   Influenza A and B and RSV PCR   Result Value Ref Range    Specimen Description Nasopharyngeal     Influenza A PCR Positive (A) NEG^Negative    Influenza B PCR Negative NEG^Negative    Resp Syncytial Virus Negative NEG^Negative   Group A Streptococcus PCR Throat Swab   Result Value Ref Range    Specimen Description Throat     Strep Group A PCR Not Detected NDET^Not Detected   Lactic acid   Result Value Ref Range    Lactic Acid 1.1 0.5 - 2.2 mmol/L   Comprehensive metabolic panel   Result Value Ref Range    Sodium 137 134 - 144 mmol/L    Potassium 4.0 3.5 - 5.1 mmol/L    Chloride 103 98 - 107 mmol/L    Carbon Dioxide 23 21 - 31 mmol/L    Anion Gap 11 3 - 14 mmol/L    Glucose 123 (H) 70 - 105 mg/dL    Urea Nitrogen 10 7 - 25 mg/dL    Creatinine 0.64 0.60 - 1.20 mg/dL    GFR Estimate >90 >60 mL/min/[1.73_m2]    GFR Estimate If Black >90 >60 mL/min/[1.73_m2]    Calcium 9.6 8.6 - 10.3 mg/dL    Bilirubin Total 0.3 0.3 - 1.0 mg/dL    Albumin 4.4 3.5 - 5.7 g/dL    Protein Total 7.3 6.4 - 8.9 g/dL    Alkaline Phosphatase 56 34 - 104 U/L    ALT 21 7 - 52 U/L    AST 20 13 - 39 U/L   CBC with platelets  differential   Result Value Ref Range    WBC 6.8 4.0 - 11.0 10e9/L    RBC Count 5.59 (H) 3.8 - 5.2 10e12/L    Hemoglobin 12.8 11.7 - 15.7 g/dL    Hematocrit 41.1 35.0 - 47.0 %    MCV 74 (L) 78 - 100 fl    MCH 22.9 (L) 26.5 - 33.0 pg    MCHC 31.1 (L) 31.5 - 36.5 g/dL    RDW 19.3 (H) 10.0 - 15.0 %    Platelet Count 246 150 - 450 10e9/L    Diff Method Automated Method     % Neutrophils 84.0 %    % Lymphocytes 7.1 %    % Monocytes 8.1 %    % Eosinophils 0.4 %    % Basophils 0.3 %    % Immature Granulocytes 0.1 %    Absolute Neutrophil 5.7 1.6 - 8.3 10e9/L    Absolute Lymphocytes 0.5 (L) 0.8 - 5.3 10e9/L    Absolute Monocytes 0.6 0.0 - 1.3 10e9/L    Absolute Eosinophils 0.0 0.0 - 0.7 10e9/L    Absolute Basophils 0.0 0.0 - 0.2 10e9/L    Abs Immature Granulocytes 0.0 0 - 0.4 10e9/L   D-Dimer [facility]   Result Value Ref Range    D-Dimer ng/mL 218 0 - 230 ng/ml D-DU   INR   Result Value Ref Range    INR 1.33 (H) 0 - 1.3   TSH Reflex [facility]   Result Value Ref Range    TSH Reflex 0.87 0.34 - 5.60 IU/mL   XR Chest 2 Views    Narrative    PROCEDURE: XR CHEST 2 VW 1/17/2020 1:21 PM    HISTORY: fever SOB    COMPARISONS: 7/31/2018.    TECHNIQUE: 2 views.    FINDINGS: Heart and pulmonary vasculature are normal. No acute  infiltrate or effusion is seen.         Impression    IMPRESSION: No acute disease.    MARÍA BYRD MD       Medications   0.9% sodium chloride BOLUS (0 mLs Intravenous Stopped 1/17/20 2836)     Followed by   sodium chloride 0.9% infusion (has no administration in time range)   ketorolac (TORADOL) injection 30 mg (30 mg Intravenous Given 1/17/20 1325)   acetaminophen (TYLENOL) tablet 500 mg (500 mg Oral Given 1/17/20 1405)   oseltamivir (TAMIFLU) capsule 75 mg (75 mg Oral Given 1/17/20 0199)       Assessments & Plan (with Medical Decision Making)     I have reviewed the nursing notes.    I have reviewed the findings, diagnosis, plan and need for follow up with the patient.      Discharge Medication List as of 1/17/2020   2:24 PM      START taking these medications    Details   loperamide (IMODIUM A-D) 2 MG tablet Take 1 tablet (2 mg) by mouth 4 times daily as needed for diarrhea, Disp-30 tablet, R-0, Local Print      ondansetron (ZOFRAN ODT) 4 MG ODT tab Take 1 tablet (4 mg) by mouth every 8 hours as needed for nausea, Disp-20 tablet, R-0, Local Print      oseltamivir (TAMIFLU) 75 MG capsule Take 1 capsule (75 mg) by mouth 2 times daily for 5 days, Disp-10 capsule, R-0, Local Print             Final diagnoses:   Influenza A   Fever   SOB (shortness of breath)   Nausea with vomiting   Loose stools     Febrile temp 101.  Vital signs stable.  Some noted tachycardia with initial heart rate at 113.  IV established and she was given fluid bolus Toradol and Tylenol for fever reduction.   EKG shows sinus tachycardia with a heart rate of 113.  Troponin and d-dimer are negative.  TSH is normal.  CBC shows normal white blood cells and no left shift.  MCV and and MCHC are decreased but this is normal for CMP is normal.  Blood cultures are pending lactic acid is normal.  Her influenza and RSV test returns positive for influenza A.   The patient was given Tamiflu orally in the ER.  Chest x-ray shows no signs of consolidation or infiltrate.  I discussed influenza.  Rx for Tamiflu, Zofran, and Imodium.  Discussed increase fluid intake over the next 3 to 5 days.  Work note was written.  Follow-up with his any concerns for further evaluation as needed.  1/17/2020   [facility] AND \[facility]\""     Den Gonzales PA-C  01/17/20 3206

## 2020-01-17 NOTE — ED TRIAGE NOTES
Pt to ER with c/o nausea, vomit, diarrhea, SOB for past two days, unable to keep any PO down.  SOB worse today.  Started with sore throat and cough.  Also c/o palpitations for 2 days.  Hx of SVT with ablation.

## 2020-01-17 NOTE — PROGRESS NOTES
"Triaged from: [facility]  Patient will be transferred to higher level of care.    DIAGNOSIS:   1. Exposure to influenza    2. Fever in adult    3. Dehydration    4. Tachycardia    5. Palpitations        Initial /80   Pulse 118   Temp 100.9  F (38.3  C) (Tympanic)   Resp 20   Ht 1.575 m (5' 2.01\")   Wt 138.4 kg (305 lb 1.6 oz)   LMP 12/18/2019   SpO2 96%   BMI 55.79 kg/m   Estimated body mass index is 55.79 kg/m  as calculated from the following:    Height as of this encounter: 1.575 m (5' 2.01\").    Weight as of this encounter: 138.4 kg (305 lb 1.6 oz).    Symptoms started for the past 3 days, states last night symptoms were the worst.  Symptom include fever, sore throat, cough, body aches, diarrhea, dry heaves, decreased appetite, shortness of breath, palpitations, fatigue, and weakness.  States she hasn't been able to eat or drink anything in the past 2 days.      Patient is concerned about dehydration and with complaints of palpitations and weakness with noted tachycardia patient will be transferred to ER for evaluation and management.        Sadie Toscano NP              "

## 2020-01-17 NOTE — TELEPHONE ENCOUNTER
Scheduling called, patient was on other line asking for update. Informed  of message below and she stated she would inform patient Michelle Reddy RN on 1/17/2020 at 11:29 AM

## 2020-01-17 NOTE — NURSING NOTE
"Chief Complaint   Patient presents with     Fever      x 3 days     Generalized Body Aches       Initial /80   Pulse 118   Temp 100.9  F (38.3  C) (Tympanic)   Resp 20   Ht 1.575 m (5' 2.01\")   Wt 138.4 kg (305 lb 1.6 oz)   LMP 12/18/2019   SpO2 96%   BMI 55.79 kg/m   Estimated body mass index is 55.79 kg/m  as calculated from the following:    Height as of this encounter: 1.575 m (5' 2.01\").    Weight as of this encounter: 138.4 kg (305 lb 1.6 oz).  Medication Reconciliation: complete    Lea Thomas LPN  "

## 2020-01-17 NOTE — ED AVS SNAPSHOT
[facility]  [address]  [place] [address]  Phone:  [phone]  Fax:  [phone]                                    Simi Godwin   MRN: [id number]    Department:  [facility]   Date of Visit:  1/17/2020           After Visit Summary Signature Page    I have received my discharge instructions, and my questions have been answered. I have discussed any challenges I see with this plan with the nurse or doctor.    ..........................................................................................................................................  Patient/Patient Representative Signature      ..........................................................................................................................................  Patient Representative Print Name and Relationship to Patient    ..................................................               ................................................  Date                                   Time    ..........................................................................................................................................  Reviewed by Signature/Title    ...................................................              ..............................................  Date                                               Time          22EPIC Rev 08/18

## 2020-01-17 NOTE — LETTER
January 17, 2020      To Whom It May Concern:      Simi Godwin was seen in our Emergency Department today, 01/17/20.   She will need to remain at home to recover from her illness but may return to work on Tuesday, 1/21/2020.  She may return sooner if her symptoms improve.    Sincerely,                    Den Acuna, MPA, PA-C

## 2020-01-17 NOTE — TELEPHONE ENCOUNTER
Patient thinks she has the flu -  - please call patient.   Leonor Gonzalez on 1/17/2020 at 10:31 AM

## 2020-01-20 LAB — INTERPRETATION ECG - MUSE: NORMAL

## 2020-01-23 LAB
BACTERIA SPEC CULT: NORMAL
BACTERIA SPEC CULT: NORMAL
SPECIMEN SOURCE: NORMAL
SPECIMEN SOURCE: NORMAL

## 2020-02-22 ENCOUNTER — HOSPITAL ENCOUNTER (EMERGENCY)
Facility: OTHER | Age: 25
Discharge: HOME | End: 2020-02-22
Attending: INTERNAL MEDICINE | Admitting: INTERNAL MEDICINE
Payer: COMMERCIAL

## 2020-02-22 VITALS
RESPIRATION RATE: 18 BRPM | OXYGEN SATURATION: 99 % | HEIGHT: 63 IN | TEMPERATURE: 98.4 F | BODY MASS INDEX: 51.91 KG/M2 | SYSTOLIC BLOOD PRESSURE: 173 MMHG | WEIGHT: 293 LBS | DIASTOLIC BLOOD PRESSURE: 111 MMHG

## 2020-02-22 DIAGNOSIS — K12.2 UVULITIS: ICD-10-CM

## 2020-02-22 LAB
SPECIMEN SOURCE: NORMAL
STREP GROUP A PCR: NOT DETECTED

## 2020-02-22 PROCEDURE — 99283 EMERGENCY DEPT VISIT LOW MDM: CPT | Mod: Z6 | Performed by: INTERNAL MEDICINE

## 2020-02-22 PROCEDURE — 99284 EMERGENCY DEPT VISIT MOD MDM: CPT | Mod: 25 | Performed by: INTERNAL MEDICINE

## 2020-02-22 PROCEDURE — 96372 THER/PROPH/DIAG INJ SC/IM: CPT | Performed by: INTERNAL MEDICINE

## 2020-02-22 PROCEDURE — 25000125 ZZHC RX 250: Performed by: INTERNAL MEDICINE

## 2020-02-22 PROCEDURE — 25000128 H RX IP 250 OP 636: Performed by: INTERNAL MEDICINE

## 2020-02-22 PROCEDURE — 87651 STREP A DNA AMP PROBE: CPT | Performed by: INTERNAL MEDICINE

## 2020-02-22 RX ORDER — CEFTRIAXONE SODIUM 1 G
1 VIAL (EA) INJECTION ONCE
Status: COMPLETED | OUTPATIENT
Start: 2020-02-22 | End: 2020-02-22

## 2020-02-22 RX ORDER — AMOXICILLIN 875 MG
875 TABLET ORAL 2 TIMES DAILY
Qty: 14 TABLET | Refills: 0 | Status: SHIPPED | OUTPATIENT
Start: 2020-02-22 | End: 2020-03-30

## 2020-02-22 RX ADMIN — LIDOCAINE HYDROCHLORIDE 1 G: 10 INJECTION, SOLUTION EPIDURAL; INFILTRATION; INTRACAUDAL; PERINEURAL at 12:19

## 2020-02-22 ASSESSMENT — MIFFLIN-ST. JEOR: SCORE: 2066.52

## 2020-02-22 ASSESSMENT — ENCOUNTER SYMPTOMS
COUGH: 0
SORE THROAT: 1

## 2020-02-22 NOTE — ED PROVIDER NOTES
History     Chief Complaint   Patient presents with     Oral Swelling     large uvula, since this morning     HPI  Simi Godwin is a 25 year old female who presents the ER for evaluation of swollen uvula.  States that this morning she woke up and she felt like she had something in her throat.  She tried coughing to get the material out of her throat.  She ended up looking in the back of her throat and found that her uvula was very swollen.  She subsequently presented to the emergency room.    She is not having fevers or chills.  Her throat is a bit sore.  No airway or breathing difficulties.    History of tonsillectomy.    Allergies:  No Known Allergies    Problem List:    Patient Active Problem List    Diagnosis Date Noted     Hypertension affecting pregnancy in third trimester 2019     Priority: Medium     S/P  section 2019     Priority: Medium     Maternal care for fetal tachycardia during pregnancy 2019     Priority: Medium     Encounter for triage in pregnant patient 2019     Priority: Medium     History of  2018     Priority: Medium     Nicotine use disorder 2018     Priority: Medium     Failure to progress in labor 2016     Priority: Medium     BMI 50.0-59.9, adult (H) 2016     Priority: Medium     Obesity affecting pregnancy 2016     Priority: Medium        Past Medical History:    Past Medical History:   Diagnosis Date     Antepartum hemorrhage 2016     Nicotine dependence, uncomplicated      Obesity complicating pregnancy 2016     Polyhydramnios      Supraventricular tachycardia (H)      Underimmunization status        Past Surgical History:    Past Surgical History:   Procedure Laterality Date      SECTION N/A 3/28/2019    Procedure:  SECTION;  Surgeon: Tomer Pereira MD;  Location: [facility] OR     EP ABLATION AVNRT       RELEASE CARPAL TUNNEL       TONSILLECTOMY         Family History:   "  Family History   Problem Relation Age of Onset     Cancer Paternal Grandfather      Diabetes Maternal Grandfather        Social History:  Marital Status:  Single [1]  Social History     Tobacco Use     Smoking status: Former Smoker     Packs/day: 0.25     Years: 2.00     Pack years: 0.50     Types: Cigarettes     Last attempt to quit: 2019     Years since quittin.0     Smokeless tobacco: Never Used     Tobacco comment: 1-3 a day   Substance Use Topics     Alcohol use: No     Alcohol/week: 0.0 standard drinks     Comment: occasional     Drug use: No     Comment: Drug use: No        Medications:    amoxicillin (AMOXIL) 875 MG tablet  aspirin 81 MG tablet  Blood Pressure Monitor KIT  loperamide (IMODIUM A-D) 2 MG tablet  omeprazole (PRILOSEC) 20 MG DR capsule  ondansetron (ZOFRAN ODT) 4 MG ODT tab          Review of Systems   HENT: Positive for sore throat.         Enlarged, swollen uvula   Respiratory: Negative for cough.    All other systems reviewed and are negative.      Physical Exam   BP: (!) 173/111  Heart Rate: 90  Temp: 98.4  F (36.9  C)  Resp: 18  Height: 158.8 cm (5' 2.5\")  Weight: 136.5 kg (301 lb)  SpO2: 99 %      Physical Exam  Constitutional:       Appearance: She is obese.   HENT:      Head: Normocephalic and atraumatic.      Right Ear: Tympanic membrane normal.      Left Ear: Tympanic membrane normal.      Nose: Nose normal. No rhinorrhea.      Mouth/Throat:      Mouth: Mucous membranes are moist.      Pharynx: Posterior oropharyngeal erythema present. No oropharyngeal exudate.      Comments: Swollen, enlarged uvula.  No peritonsillar abscess or deformity.  Eyes:      General: No scleral icterus.     Conjunctiva/sclera: Conjunctivae normal.      Pupils: Pupils are equal, round, and reactive to light.   Cardiovascular:      Rate and Rhythm: Normal rate.   Pulmonary:      Effort: Pulmonary effort is normal. No respiratory distress.      Breath sounds: Normal breath sounds. No stridor. "   Lymphadenopathy:      Cervical: No cervical adenopathy.   Neurological:      Mental Status: She is alert.         ED Course     ED Course as of Feb 22 1230   Sat Feb 22, 2020   1222 Patient was evaluated.  Rapid strep returned negative.  1 g Rocephin IM administered.  Patient tolerated well.  Discharged home in stable condition, 7-day prescription of amoxicillin.  Advised follow-up as needed for new or worsening symptoms.        Procedures                 Results for orders placed or performed during the hospital encounter of 02/22/20 (from the past 24 hour(s))   Group A Streptococcus PCR Throat Swab   Result Value Ref Range    Specimen Description Throat     Strep Group A PCR Not Detected NDET^Not Detected       Medications   cefTRIAXone (ROCEPHIN) 1 g in lidocaine (PF) (XYLOCAINE) 1 % injection (1 g Intramuscular Given 2/22/20 1219)       Assessments & Plan (with Medical Decision Making)     I have reviewed the nursing notes.    I have reviewed the findings, diagnosis, plan and need for follow up with the patient.  Patient discharged home in stable condition.  Tylenol and ibuprofen as needed for pain.  Take amoxicillin x7 days.  Return as needed for new or worsening symptoms.    Discharge Medication List as of 2/22/2020 12:16 PM      START taking these medications    Details   amoxicillin (AMOXIL) 875 MG tablet Take 1 tablet (875 mg) by mouth 2 times daily for 7 days, Disp-14 tablet, R-0, E-Prescribe             Final diagnoses:   Uvulitis       2/22/2020   [facility]     Rito Osborn MD  02/22/20 1230

## 2020-02-22 NOTE — DISCHARGE INSTRUCTIONS
Antibiotic shot of Rocephin/ceftriaxone given in emergency room.    Take amoxicillin twice daily for 7 days.    Okay to use ibuprofen and Tylenol as needed for pain or fever.    Return to Clinic or ER for reevaluation, if new or worsening symptoms develop

## 2020-02-22 NOTE — ED TRIAGE NOTES
Patient to ER reporting she awoke with sore throat and sensation of foreign body, upon visual inspection patient noticed very large uvula. It is swollen and anteriorly displaced over her tongue. No acute respiratory distress noted.

## 2020-02-22 NOTE — ED AVS SNAPSHOT
[facility]  [address]  [place] [address]  Phone:  [phone]  Fax:  [phone]                                    Simi Godwin   MRN: [id number]    Department:  [facility]   Date of Visit:  2/22/2020           After Visit Summary Signature Page    I have received my discharge instructions, and my questions have been answered. I have discussed any challenges I see with this plan with the nurse or doctor.    ..........................................................................................................................................  Patient/Patient Representative Signature      ..........................................................................................................................................  Patient Representative Print Name and Relationship to Patient    ..................................................               ................................................  Date                                   Time    ..........................................................................................................................................  Reviewed by Signature/Title    ...................................................              ..............................................  Date                                               Time          22EPIC Rev 08/18

## 2020-03-30 ENCOUNTER — TELEPHONE (OUTPATIENT)
Dept: FAMILY MEDICINE | Facility: OTHER | Age: 25
End: 2020-03-30

## 2020-03-30 NOTE — TELEPHONE ENCOUNTER
Attempted to contact patient at # provided and person who answered phone stated patient was at work and is not available right now, no other # provided.    Jennifer Arcos RN on 3/30/2020 at 12:32 PM

## 2020-03-30 NOTE — TELEPHONE ENCOUNTER
Patient was put into Allyssa Dang MD's schedule today. Allyssa Dang MD would like patient to be triaged, and isn't sure if she should be seen in person versus over the phone.     Christel Hartman LPN.................. 3/30/2020 11:55 AM

## 2020-03-31 ENCOUNTER — HOSPITAL ENCOUNTER (OUTPATIENT)
Dept: GENERAL RADIOLOGY | Facility: OTHER | Age: 25
End: 2020-03-31
Attending: FAMILY MEDICINE
Payer: COMMERCIAL

## 2020-03-31 ENCOUNTER — OFFICE VISIT (OUTPATIENT)
Dept: FAMILY MEDICINE | Facility: OTHER | Age: 25
End: 2020-03-31
Attending: FAMILY MEDICINE
Payer: COMMERCIAL

## 2020-03-31 VITALS
RESPIRATION RATE: 20 BRPM | WEIGHT: 293 LBS | DIASTOLIC BLOOD PRESSURE: 92 MMHG | OXYGEN SATURATION: 98 % | TEMPERATURE: 99.5 F | BODY MASS INDEX: 55.44 KG/M2 | HEART RATE: 96 BPM | SYSTOLIC BLOOD PRESSURE: 146 MMHG

## 2020-03-31 DIAGNOSIS — R42 DIZZINESS: ICD-10-CM

## 2020-03-31 DIAGNOSIS — R00.2 PALPITATIONS: Primary | ICD-10-CM

## 2020-03-31 DIAGNOSIS — F41.1 GAD (GENERALIZED ANXIETY DISORDER): ICD-10-CM

## 2020-03-31 DIAGNOSIS — R00.2 PALPITATIONS: ICD-10-CM

## 2020-03-31 DIAGNOSIS — R03.0 ELEVATED BLOOD PRESSURE READING WITHOUT DIAGNOSIS OF HYPERTENSION: ICD-10-CM

## 2020-03-31 LAB
BASOPHILS # BLD AUTO: 0 10E9/L (ref 0–0.2)
BASOPHILS NFR BLD AUTO: 0.3 %
D DIMER PPP DDU-MCNC: 214 NG/ML D-DU (ref 0–230)
DIFFERENTIAL METHOD BLD: ABNORMAL
EOSINOPHIL # BLD AUTO: 0.1 10E9/L (ref 0–0.7)
EOSINOPHIL NFR BLD AUTO: 1.3 %
ERYTHROCYTE [DISTWIDTH] IN BLOOD BY AUTOMATED COUNT: 16 % (ref 10–15)
HCT VFR BLD AUTO: 43 % (ref 35–47)
HGB BLD-MCNC: 13.6 G/DL (ref 11.7–15.7)
IMM GRANULOCYTES # BLD: 0 10E9/L (ref 0–0.4)
IMM GRANULOCYTES NFR BLD: 0.3 %
LYMPHOCYTES # BLD AUTO: 1.5 10E9/L (ref 0.8–5.3)
LYMPHOCYTES NFR BLD AUTO: 22 %
MCH RBC QN AUTO: 24.6 PG (ref 26.5–33)
MCHC RBC AUTO-ENTMCNC: 31.6 G/DL (ref 31.5–36.5)
MCV RBC AUTO: 78 FL (ref 78–100)
MONOCYTES # BLD AUTO: 0.5 10E9/L (ref 0–1.3)
MONOCYTES NFR BLD AUTO: 6.6 %
NEUTROPHILS # BLD AUTO: 4.7 10E9/L (ref 1.6–8.3)
NEUTROPHILS NFR BLD AUTO: 69.5 %
PLATELET # BLD AUTO: 287 10E9/L (ref 150–450)
RBC # BLD AUTO: 5.53 10E12/L (ref 3.8–5.2)
WBC # BLD AUTO: 6.8 10E9/L (ref 4–11)

## 2020-03-31 PROCEDURE — 71046 X-RAY EXAM CHEST 2 VIEWS: CPT

## 2020-03-31 PROCEDURE — 93005 ELECTROCARDIOGRAM TRACING: CPT

## 2020-03-31 PROCEDURE — 99214 OFFICE O/P EST MOD 30 MIN: CPT | Performed by: FAMILY MEDICINE

## 2020-03-31 PROCEDURE — 93010 ELECTROCARDIOGRAM REPORT: CPT | Performed by: INTERNAL MEDICINE

## 2020-03-31 PROCEDURE — G0463 HOSPITAL OUTPT CLINIC VISIT: HCPCS

## 2020-03-31 PROCEDURE — 85379 FIBRIN DEGRADATION QUANT: CPT | Mod: ZL | Performed by: FAMILY MEDICINE

## 2020-03-31 PROCEDURE — 85025 COMPLETE CBC W/AUTO DIFF WBC: CPT | Mod: ZL | Performed by: FAMILY MEDICINE

## 2020-03-31 PROCEDURE — 36415 COLL VENOUS BLD VENIPUNCTURE: CPT | Mod: ZL | Performed by: FAMILY MEDICINE

## 2020-03-31 PROCEDURE — G0463 HOSPITAL OUTPT CLINIC VISIT: HCPCS | Mod: 25

## 2020-03-31 RX ORDER — SERTRALINE HYDROCHLORIDE 25 MG/1
TABLET, FILM COATED ORAL
Qty: 60 TABLET | Refills: 0 | Status: SHIPPED | OUTPATIENT
Start: 2020-03-31 | End: 2020-04-23

## 2020-03-31 ASSESSMENT — PAIN SCALES - GENERAL: PAINLEVEL: MODERATE PAIN (4)

## 2020-03-31 NOTE — PATIENT INSTRUCTIONS
Pleurisy/chest wall inflammation: try motrin 600 mg 3 x daily x 10 days    Anxiety: start zoloft each morning, follow-up in 1month via tele visit   monitor blood pressure, goal < 140/90

## 2020-03-31 NOTE — PROGRESS NOTES
"  SUBJECTIVE:   Simi Godwin is a 25 year old female who presents to clinic today for the following health issues:  Nursing Notes:   Denisse Sweet LPN  3/31/2020  1:07 PM  Sign at exiting of workspace  Patient is here for concerns of palpitations, dizziness, sob. States has been on going since January. Comes and goes, feels like she is not able to take a deep breath.  Denisse Sweet LPN .............3/31/2020     1:03 PM      Patient's last menstrual period was 2020.  Medication Reconciliation: complete    Denisse Sweet LPN  3/31/2020 1:07 PM      HPI    26 yo female presents with 3 month history of chest wall pain and feels winded. Feels like she \"can't get air\"    Denies fever, chills, myalgias, fatigue, sore throat, cough or runny nose.    SVT, s/p ablation in .    LMNO lelo.  He is concerned about her exposure to COVID    No recent travel, no exposure to COVID    She reports increased anxiety over the last few months.  This has been a struggle for her in the past.  She previously was on medications but cannot recall the name.    Patient Active Problem List    Diagnosis Date Noted     Hypertension affecting pregnancy in third trimester 2019     Priority: Medium     S/P  section 2019     Priority: Medium     Maternal care for fetal tachycardia during pregnancy 2019     Priority: Medium     Encounter for triage in pregnant patient 2019     Priority: Medium     History of  2018     Priority: Medium     Nicotine use disorder 2018     Priority: Medium     Failure to progress in labor 2016     Priority: Medium     BMI 50.0-59.9, adult (H) 2016     Priority: Medium     Obesity affecting pregnancy 2016     Priority: Medium     Past Medical History:   Diagnosis Date     Antepartum hemorrhage 2016     Nicotine dependence, uncomplicated     has quit     Obesity complicating pregnancy 2016     Polyhydramnios      " Supraventricular tachycardia (H) 2013     Underimmunization status     9/6/2016        Review of Systems   See hPi  OBJECTIVE:     BP (!) 146/92 (BP Location: Right arm, Patient Position: Sitting, Cuff Size: Adult Large)   Pulse 96   Temp 99.5  F (37.5  C) (Tympanic)   Resp 20   Wt 139.7 kg (308 lb)   LMP 03/05/2020   SpO2 98%   Breastfeeding No   BMI 55.44 kg/m    Body mass index is 55.44 kg/m .  Physical Exam  HENT:      Head: Normocephalic.      Mouth/Throat:      Mouth: Mucous membranes are moist.      Pharynx: Oropharynx is clear.   Eyes:      Conjunctiva/sclera: Conjunctivae normal.   Neck:      Musculoskeletal: Normal range of motion.   Cardiovascular:      Rate and Rhythm: Normal rate.      Heart sounds: No murmur. No gallop.    Pulmonary:      Effort: Pulmonary effort is normal. No respiratory distress.      Breath sounds: No wheezing.   Chest:      Chest wall: Tenderness present.   Neurological:      Mental Status: She is alert.         Diagnostic Test Results:  none     EKG showed a normal sinus rhythm    Results for orders placed or performed during the hospital encounter of 03/31/20   XR Chest 2 Views     Status: None    Narrative    PROCEDURE: XR CHEST 2 VW 3/31/2020 2:13 PM    HISTORY: Palpitations    COMPARISONS: 17 2020.    TECHNIQUE: 2 views.    FINDINGS: Heart and pulmonary vasculature are normal. No acute  infiltrate or effusion is seen.         Impression    IMPRESSION: No acute disease.    MARÍA BYRD MD   Results for orders placed or performed in visit on 03/31/20   CBC and Differential     Status: Abnormal   Result Value Ref Range    WBC 6.8 4.0 - 11.0 10e9/L    RBC Count 5.53 (H) 3.8 - 5.2 10e12/L    Hemoglobin 13.6 11.7 - 15.7 g/dL    Hematocrit 43.0 35.0 - 47.0 %    MCV 78 78 - 100 fl    MCH 24.6 (L) 26.5 - 33.0 pg    MCHC 31.6 31.5 - 36.5 g/dL    RDW 16.0 (H) 10.0 - 15.0 %    Platelet Count 287 150 - 450 10e9/L    Diff Method Automated Method     % Neutrophils 69.5 %    %  Lymphocytes 22.0 %    % Monocytes 6.6 %    % Eosinophils 1.3 %    % Basophils 0.3 %    % Immature Granulocytes 0.3 %    Absolute Neutrophil 4.7 1.6 - 8.3 10e9/L    Absolute Lymphocytes 1.5 0.8 - 5.3 10e9/L    Absolute Monocytes 0.5 0.0 - 1.3 10e9/L    Absolute Eosinophils 0.1 0.0 - 0.7 10e9/L    Absolute Basophils 0.0 0.0 - 0.2 10e9/L    Abs Immature Granulocytes 0.0 0 - 0.4 10e9/L   D-Dimer GH     Status: None   Result Value Ref Range    D-Dimer ng/mL 214 0 - 230 ng/ml D-DU   EKG 12-lead complete w/read - Clinics     Status: None   Result Value Ref Range    Interpretation ECG Click View Image link to view waveform and result        ASSESSMENT/PLAN:       25-year-old female with 3-month history of atypical chest pain palpitations and anxiety.    ICD-10-CM    1. Palpitations  R00.2 EKG 12-lead complete w/read - Clinics     XR Chest 2 Views     D-Dimer GH     CBC and Differential     CBC and Differential     D-Dimer GH   2. Dizziness  R42 D-Dimer GH     CBC and Differential     CBC and Differential     D-Dimer GH   3. GINO (generalized anxiety disorder)  F41.1 sertraline (ZOLOFT) 25 MG tablet   4. Elevated blood pressure reading without diagnosis of hypertension  R03.0 Home Blood Pressure Monitor Order for DME - ONLY FOR DME       CBC d-dimer and EKG were all within normal limits.  Recommend patient monitor her blood pressure at home.  Could consider Zio patch at a later date if her symptoms persist.    Recommend patient start on Zoloft 25 mg daily increase to 50 mg after a week.  She will follow-up with a phone visit in 1 month.    Sho Joya MD  [facility]

## 2020-03-31 NOTE — NURSING NOTE
Patient is here for concerns of palpitations, dizziness, sob. States has been on going since January. Comes and goes, feels like she is not able to take a deep breath.  Denisse Sweet LPN .............3/31/2020     1:03 PM      Patient's last menstrual period was 03/05/2020.  Medication Reconciliation: complete    Denisse Sweet LPN  3/31/2020 1:07 PM

## 2020-04-01 ENCOUNTER — MYC MEDICAL ADVICE (OUTPATIENT)
Dept: FAMILY MEDICINE | Facility: OTHER | Age: 25
End: 2020-04-01

## 2020-04-01 DIAGNOSIS — G44.009 CLUSTER HEADACHE, NOT INTRACTABLE, UNSPECIFIED CHRONICITY PATTERN: Primary | ICD-10-CM

## 2020-04-01 LAB — INTERPRETATION ECG - MUSE: NORMAL

## 2020-04-02 ENCOUNTER — HOSPITAL ENCOUNTER (EMERGENCY)
Facility: OTHER | Age: 25
Discharge: HOME | End: 2020-04-02
Attending: FAMILY MEDICINE | Admitting: FAMILY MEDICINE
Payer: COMMERCIAL

## 2020-04-02 VITALS
BODY MASS INDEX: 55.44 KG/M2 | OXYGEN SATURATION: 98 % | SYSTOLIC BLOOD PRESSURE: 99 MMHG | HEART RATE: 92 BPM | DIASTOLIC BLOOD PRESSURE: 57 MMHG | WEIGHT: 293 LBS | RESPIRATION RATE: 29 BRPM

## 2020-04-02 DIAGNOSIS — R00.2 PALPITATIONS: ICD-10-CM

## 2020-04-02 DIAGNOSIS — I10 BENIGN ESSENTIAL HYPERTENSION: ICD-10-CM

## 2020-04-02 LAB
ANION GAP SERPL CALCULATED.3IONS-SCNC: 9 MMOL/L (ref 3–14)
BUN SERPL-MCNC: 12 MG/DL (ref 7–25)
CALCIUM SERPL-MCNC: 9.5 MG/DL (ref 8.6–10.3)
CHLORIDE SERPL-SCNC: 104 MMOL/L (ref 98–107)
CO2 SERPL-SCNC: 26 MMOL/L (ref 21–31)
CREAT SERPL-MCNC: 0.62 MG/DL (ref 0.6–1.2)
GFR SERPL CREATININE-BSD FRML MDRD: >90 ML/MIN/{1.73_M2}
GLUCOSE SERPL-MCNC: 103 MG/DL (ref 70–105)
INTERPRETATION ECG - MUSE: NORMAL
POTASSIUM SERPL-SCNC: 3.8 MMOL/L (ref 3.5–5.1)
SODIUM SERPL-SCNC: 139 MMOL/L (ref 134–144)
TROPONIN I SERPL-MCNC: 3 PG/ML

## 2020-04-02 PROCEDURE — 93010 ELECTROCARDIOGRAM REPORT: CPT | Performed by: INTERNAL MEDICINE

## 2020-04-02 PROCEDURE — 84484 ASSAY OF TROPONIN QUANT: CPT | Performed by: FAMILY MEDICINE

## 2020-04-02 PROCEDURE — 93005 ELECTROCARDIOGRAM TRACING: CPT | Performed by: FAMILY MEDICINE

## 2020-04-02 PROCEDURE — 25000132 ZZH RX MED GY IP 250 OP 250 PS 637: Performed by: FAMILY MEDICINE

## 2020-04-02 PROCEDURE — 80048 BASIC METABOLIC PNL TOTAL CA: CPT | Performed by: FAMILY MEDICINE

## 2020-04-02 PROCEDURE — 99284 EMERGENCY DEPT VISIT MOD MDM: CPT | Mod: 25 | Performed by: FAMILY MEDICINE

## 2020-04-02 PROCEDURE — 99283 EMERGENCY DEPT VISIT LOW MDM: CPT | Mod: Z6 | Performed by: FAMILY MEDICINE

## 2020-04-02 PROCEDURE — 36415 COLL VENOUS BLD VENIPUNCTURE: CPT | Performed by: FAMILY MEDICINE

## 2020-04-02 RX ORDER — METOPROLOL SUCCINATE 25 MG/1
25 TABLET, EXTENDED RELEASE ORAL DAILY
Qty: 15 TABLET | Refills: 0 | Status: SHIPPED | OUTPATIENT
Start: 2020-04-02 | End: 2021-12-16

## 2020-04-02 RX ADMIN — IBUPROFEN 600 MG: 200 TABLET, FILM COATED ORAL at 02:24

## 2020-04-02 ASSESSMENT — ENCOUNTER SYMPTOMS
SHORTNESS OF BREATH: 1
PALPITATIONS: 1
FEVER: 0
ARTHRALGIAS: 0
NECK STIFFNESS: 0
EYE REDNESS: 0
HEADACHES: 0
ABDOMINAL PAIN: 0
DIFFICULTY URINATING: 0
CHEST TIGHTNESS: 0
COLOR CHANGE: 0
CONFUSION: 0

## 2020-04-02 NOTE — ED TRIAGE NOTES
Patient presents to the ED with complaints of SOB and palpitations intermittently since January.  Patient states tonight she started to have pain in her L) arm that lasted 5mins, however, she has also had mid sternal chest pain that has occurred intermittently x1 week.

## 2020-04-02 NOTE — ED NOTES
"Staff entered patients room to discharge patient at which time patient was teary eyed and stated, \"can I have my troponin checked, I just dont feel good about leaving without having labs\".  Provider is in another patients room at this time, staff stated to patient that they will speak with primary  "

## 2020-04-02 NOTE — ED AVS SNAPSHOT
[facility]  [address]  [place] [address]  Phone:  [phone]  Fax:  [phone]                                    Simi Godwin   MRN: [id number]    Department:  [facility]   Date of Visit:  4/2/2020           After Visit Summary Signature Page    I have received my discharge instructions, and my questions have been answered. I have discussed any challenges I see with this plan with the nurse or doctor.    ..........................................................................................................................................  Patient/Patient Representative Signature      ..........................................................................................................................................  Patient Representative Print Name and Relationship to Patient    ..................................................               ................................................  Date                                   Time    ..........................................................................................................................................  Reviewed by Signature/Title    ...................................................              ..............................................  Date                                               Time          22EPIC Rev 08/18

## 2020-04-02 NOTE — TELEPHONE ENCOUNTER
Patient was seen today in ER. Labs done BMP and troponin. Does typ want to add any or any other recommendations?   Denisse Sweet LPN .............4/2/2020     8:03 AM

## 2020-04-02 NOTE — ED PROVIDER NOTES
History     Chief Complaint   Patient presents with     Chest Pain     HPI  Simi Godwin is a 25 year old female who presents to the emergency department with sensation of shortness of breath and fluttering in her chest intermittently since January.  Tonight had increased pain that lasted about 5 minutes and she became more concerned so came into the emergency department.  She thought the pain may have radiated down her left arm today as well.  Patient does not endorse chest tightness or chest pain at the time of her ER visit.  Her shortness of breath is not worse currently no diaphoresis.  No recent fevers chills or shakes no nausea vomiting or diarrhea.  Patient does have history of pregnancy-induced hypertension.  Reviewed nurse's notes below, similar history is related to me.   ED Triage Notes  Patient presents to the ED with complaints of SOB and palpitations intermittently since January.  Patient states tonight she started to have pain in her L) arm that lasted 5mins, however, she has also had mid sternal chest pain that has occurred intermittently x1 week.        Allergies:  No Known Allergies    Problem List:    Patient Active Problem List    Diagnosis Date Noted     Hypertension affecting pregnancy in third trimester 2019     Priority: Medium     S/P  section 2019     Priority: Medium     Maternal care for fetal tachycardia during pregnancy 2019     Priority: Medium     Encounter for triage in pregnant patient 2019     Priority: Medium     History of  2018     Priority: Medium     Nicotine use disorder 2018     Priority: Medium     Failure to progress in labor 2016     Priority: Medium     BMI 50.0-59.9, adult (H) 2016     Priority: Medium     Obesity affecting pregnancy 2016     Priority: Medium        Past Medical History:    Past Medical History:   Diagnosis Date     Antepartum hemorrhage 2016     Nicotine dependence,  uncomplicated      Obesity complicating pregnancy 2016     Polyhydramnios      Supraventricular tachycardia (H) 2013     Underimmunization status        Past Surgical History:    Past Surgical History:   Procedure Laterality Date      SECTION N/A 3/28/2019    Procedure:  SECTION;  Surgeon: Tomer Pereira MD;  Location: [facility] OR     EP ABLATION AVNRT  2013     RELEASE CARPAL TUNNEL       TONSILLECTOMY         Family History:    Family History   Problem Relation Age of Onset     Cancer Paternal Grandfather      Diabetes Maternal Grandfather        Social History:  Marital Status:  Single [1]  Social History     Tobacco Use     Smoking status: Former Smoker     Packs/day: 0.25     Years: 2.00     Pack years: 0.50     Types: Cigarettes     Last attempt to quit: 2019     Years since quittin.2     Smokeless tobacco: Never Used     Tobacco comment: 1-3 a day   Substance Use Topics     Alcohol use: Not Currently     Alcohol/week: 0.0 standard drinks     Comment: occasional     Drug use: No     Comment: Drug use: No        Medications:    ferrous sulfate (FEROSUL) 325 (65 Fe) MG tablet  metoprolol succinate ER (TOPROL-XL) 25 MG 24 hr tablet  omeprazole (PRILOSEC) 20 MG DR capsule  sertraline (ZOLOFT) 25 MG tablet          Review of Systems   Constitutional: Negative for fever.   HENT: Negative for congestion.    Eyes: Negative for redness.   Respiratory: Positive for shortness of breath. Negative for chest tightness.    Cardiovascular: Positive for palpitations. Negative for chest pain.   Gastrointestinal: Negative for abdominal pain.   Genitourinary: Negative for difficulty urinating.   Musculoskeletal: Negative for arthralgias and neck stiffness.   Skin: Negative for color change.   Neurological: Negative for headaches.   Psychiatric/Behavioral: Negative for confusion.       Physical Exam   BP: (!) 152/114  Pulse: 92  Resp: 18  Weight: 139.7 kg (308 lb)  SpO2: 99 %      Physical  Exam  Vitals signs and nursing note reviewed.   Constitutional:       General: She is not in acute distress.     Appearance: She is not diaphoretic.   HENT:      Head: Atraumatic.      Mouth/Throat:      Pharynx: No oropharyngeal exudate.   Eyes:      General: No scleral icterus.     Pupils: Pupils are equal, round, and reactive to light.   Cardiovascular:      Heart sounds: Normal heart sounds.   Pulmonary:      Effort: No respiratory distress.      Breath sounds: Normal breath sounds.   Chest:      Chest wall: Tenderness present.   Abdominal:      General: Bowel sounds are normal.      Palpations: Abdomen is soft.      Tenderness: There is no abdominal tenderness.   Musculoskeletal:         General: No tenderness.   Skin:     General: Skin is warm.      Findings: No rash.         ED Course        Procedures             No results found for this or any previous visit (from the past 24 hour(s)).    Medications - No data to display    Assessments & Plan (with Medical Decision Making)     I have reviewed the nursing notes.    I have reviewed the findings, diagnosis, plan and need for follow up with the patient.    New Prescriptions    METOPROLOL SUCCINATE ER (TOPROL-XL) 25 MG 24 HR TABLET    Take 1 tablet (25 mg) by mouth daily       Final diagnoses:   Benign essential hypertension   Palpitations     Labs are reassuring.  Patient feels better after an ibuprofen.  Patient does have resting hypertension and resting tachycardia upon presentation.  Recommend trial of low-dose metoprolol at home to see if she feels better and placed a referral to cardiology to discuss.  Patient is very anxious about this she feels that she does need to see a cardiologist and have an echo.  She did have a friend apparently that had a viral cardiomyopathy and is now on heart failure so she is very concerned about this and I think it be reasonable to have her see cardiology and have them decide whether she needs an echo or not.  Her lungs are  clear here and she has no clinical signs of fluid overload and I have low clinical suspicion for occult ACS or occult congestive heart failure at this time.  She is to return to the emergency department worsening symptoms such as fever chest pain or worsening shortness of breath.  Patient verbalized understanding plan is in agreement she left the ER improved condition.  4/2/2020   [facility]     Neto Guzman MD  04/02/20 1932

## 2020-04-23 ENCOUNTER — VIRTUAL VISIT (OUTPATIENT)
Dept: FAMILY MEDICINE | Facility: OTHER | Age: 25
End: 2020-04-23
Attending: FAMILY MEDICINE
Payer: COMMERCIAL

## 2020-04-23 DIAGNOSIS — F41.1 GAD (GENERALIZED ANXIETY DISORDER): ICD-10-CM

## 2020-04-23 PROCEDURE — 99441 ZZC PHYSICIAN TELEPHONE EVALUATION 5-10 MIN: CPT | Performed by: FAMILY MEDICINE

## 2020-04-23 ASSESSMENT — PATIENT HEALTH QUESTIONNAIRE - PHQ9
5. POOR APPETITE OR OVEREATING: NOT AT ALL
SUM OF ALL RESPONSES TO PHQ QUESTIONS 1-9: 2

## 2020-04-23 ASSESSMENT — ANXIETY QUESTIONNAIRES
5. BEING SO RESTLESS THAT IT IS HARD TO SIT STILL: NOT AT ALL
3. WORRYING TOO MUCH ABOUT DIFFERENT THINGS: SEVERAL DAYS
IF YOU CHECKED OFF ANY PROBLEMS ON THIS QUESTIONNAIRE, HOW DIFFICULT HAVE THESE PROBLEMS MADE IT FOR YOU TO DO YOUR WORK, TAKE CARE OF THINGS AT HOME, OR GET ALONG WITH OTHER PEOPLE: NOT DIFFICULT AT ALL
1. FEELING NERVOUS, ANXIOUS, OR ON EDGE: SEVERAL DAYS
2. NOT BEING ABLE TO STOP OR CONTROL WORRYING: NOT AT ALL
6. BECOMING EASILY ANNOYED OR IRRITABLE: NOT AT ALL
7. FEELING AFRAID AS IF SOMETHING AWFUL MIGHT HAPPEN: SEVERAL DAYS
GAD7 TOTAL SCORE: 3

## 2020-04-23 ASSESSMENT — PAIN SCALES - GENERAL: PAINLEVEL: NO PAIN (0)

## 2020-04-23 NOTE — NURSING NOTE
Patient is having virtual visit for medication check for zoloft. States is feeling good, only side effect she has noticed is waking up in middle of night and difficulty falling asleep.      Patient's last menstrual period was 04/03/2020 (approximate).  Medication Reconciliation: complete    Denisse Sweet LPN  4/23/2020 11:25 AM

## 2020-04-23 NOTE — PROGRESS NOTES
"Simi Godwin is a 25 year old female who is being evaluated via a billable telephone visit.      The patient has been notified of following:     \"This telephone visit will be conducted via a call between you and your physician/provider. We have found that certain health care needs can be provided without the need for a physical exam.  This service lets us provide the care you need with a short phone conversation.  If a prescription is necessary we can send it directly to your pharmacy.  If lab work is needed we can place an order for that and you can then stop by our lab to have the test done at a later time.    Telephone visits are billed at different rates depending on your insurance coverage. During this emergency period, for some insurers they may be billed the same as an in-person visit.  Please reach out to your insurance provider with any questions.    If during the course of the call the physician/provider feels a telephone visit is not appropriate, you will not be charged for this service.\"    Patient has given verbal consent for Telephone visit?  Yes    How would you like to obtain your AVS? MyChart    Subjective     Simi Godwin is a 25 year old female who presents to clinic today for the following health issues:  Nursing Notes:   Denisse Sweet LPN  4/23/2020 11:30 AM  Signed  Patient is having virtual visit for medication check for zoloft. States is feeling good, only side effect she has noticed is waking up in middle of night and difficulty falling asleep.      Patient's last menstrual period was 04/03/2020 (approximate).  Medication Reconciliation: timbo Sweet LPN  4/23/2020 11:25 AM        HPI     24 yo female evaluated via telephone visit for anxiety disorder.  She was started on Zoloft 25 mg daily at her last visit and then increase to 50 mg daily a week ago.  She reports significant improvement in irritability.  She feels she is more patient with her children.  Her " only concern is some sleep disturbance.  She is not certain if it is related to the medication or not.                 Patient Active Problem List   Diagnosis     BMI 50.0-59.9, adult (H)     Failure to progress in labor     Nicotine use disorder     Obesity affecting pregnancy     History of      Encounter for triage in pregnant patient     Maternal care for fetal tachycardia during pregnancy     Hypertension affecting pregnancy in third trimester     S/P  section     Past Surgical History:   Procedure Laterality Date      SECTION N/A 3/28/2019    Procedure:  SECTION;  Surgeon: Tomer Pereira MD;  Location: [facility] OR     EP ABLATION AVNRT       RELEASE CARPAL TUNNEL       TONSILLECTOMY         Social History     Tobacco Use     Smoking status: Former Smoker     Packs/day: 0.25     Years: 2.00     Pack years: 0.50     Types: Cigarettes     Last attempt to quit: 2019     Years since quittin.2     Smokeless tobacco: Never Used     Tobacco comment: 1-3 a day   Substance Use Topics     Alcohol use: Not Currently     Alcohol/week: 0.0 standard drinks     Comment: occasional     Family History   Problem Relation Age of Onset     Cancer Paternal Grandfather      Diabetes Maternal Grandfather            Reviewed and updated as needed this visit by Provider         Review of Systems   ROS COMP: Constitutional, HEENT, cardiovascular, pulmonary, gi and gu systems are negative, except as otherwise noted.       Objective   Reported vitals:  LMP 2020 (Approximate)   Breastfeeding No    healthy, alert and no distress  PSYCH: Alert and oriented times 3; coherent speech, normal   rate and volume, able to articulate logical thoughts, able   to abstract reason, no tangential thoughts, no hallucinations   or delusions  Her affect is normal    Remainder of exam unable to be completed due to telephone visits    Diagnostic Test Results:  Labs reviewed in Epic         Assessment/Plan:  1. GINO (generalized anxiety disorder)  Patient will continue on Zoloft 50 mg daily.  Recommended she try taking it at bedtime.  Encouraged regular exercise healthy diet.  May also try melatonin at bedtime  - sertraline (ZOLOFT) 50 MG tablet; 1 tab daily  Dispense: 90 tablet; Refill: 0    No follow-ups on file.      Phone call duration:  8 minutes    Sho Joya MD

## 2020-04-24 ASSESSMENT — ANXIETY QUESTIONNAIRES: GAD7 TOTAL SCORE: 3

## 2020-04-28 ENCOUNTER — VIRTUAL VISIT (OUTPATIENT)
Dept: CARDIOLOGY | Facility: OTHER | Age: 25
End: 2020-04-28
Attending: INTERNAL MEDICINE
Payer: COMMERCIAL

## 2020-04-28 DIAGNOSIS — R00.2 PALPITATIONS: Primary | ICD-10-CM

## 2020-04-28 DIAGNOSIS — Z98.890 HISTORY OF CARDIAC RADIOFREQUENCY ABLATION: ICD-10-CM

## 2020-04-28 DIAGNOSIS — Z86.79 H/O SUPRAVENTRICULAR TACHYCARDIA: ICD-10-CM

## 2020-04-28 PROCEDURE — 99204 OFFICE O/P NEW MOD 45 MIN: CPT | Mod: GT | Performed by: INTERNAL MEDICINE

## 2020-04-28 NOTE — PROGRESS NOTES
"Simi Godwin is a 25 year old female who is being evaluated via a billable video visit.      The patient has been notified of following:     \"This video visit will be conducted via a call between you and your physician/provider. We have found that certain health care needs can be provided without the need for an in-person physical exam.  This service lets us provide the care you need with a video conversation.  If a prescription is necessary we can send it directly to your pharmacy.  If lab work is needed we can place an order for that and you can then stop by our lab to have the test done at a later time.    Video visits are billed at different rates depending on your insurance coverage.  Please reach out to your insurance provider with any questions.    If during the course of the call the physician/provider feels a video visit is not appropriate, you will not be charged for this service.\"    Patient has given verbal consent for Video visit? Yes    How would you like to obtain your AVS? Gael    Patient would like the video invitation sent by: Text to cell phone: [phone]    Will anyone else be joining your video visit? Dotty Belcher is a 25-year-old female who has a history of SVT ablation for AVNRT in [place] in approximately 2012.  Her symptoms started at age 11, and they thought she had asthma.  She was placed on inhaler for period of time.  Her symptoms went away after her ablation.  More recently, surrounding the birth of her daughter which was on 3/26/2019, she has had increasing palpitations.  She can go a couple of days without any palpitations.  She can have up to 5 episodes of palpitations a day.  Her palpitations last only seconds.  Previously, her palpitations frequently lasted 15 to 20 minutes with the longest episode lasting 30 minutes.  She had previously been on metoprolol with improvement.  She has had multiple Holter monitors and eventually determined to have SVT/AVNRT.  " Presently, her palpitations only last a second.  She describes essentially 1 extra beat.  Based on her history, she is concerned that she may have SVT or possibly PVC's.  She also reports the possibility of high blood pressure.  She has been given the means to obtain a blood pressure cuff but she has not obtained 1.  It was suggested she get a blood pressure cuff to check her heart rate and blood pressure and record these findings and present them at her next visit.  She describes being somewhat stressed out, having anxiety.  She was started on Zoloft recently with improvement.  She was seen in the ER on 4/2/2020 and was given a prescription for metoprolol.  She has not tried this medication yet as she wanted to discuss the medication today.  She was told to refrain from taking this medication until the ZIO Patch is completed.    Impression:  1.  Palpitations.  2.  H/O supraventricular tachycardia.  3.  H/O SVT ablation in approximately 2012 in [place].  4.  Obesity.  5.  H/O tobacco abuse on 1/19/2019.  6.  Pregnancy-induced hypertension.    Plan:  1.  To determine the etiology of her palpitations, will obtain a ZIO Patch.  2.  She is to refrain from taking metoprolol until after completion of the ZIO Patch.  3.  She is to obtain a blood pressure cuff and check her blood pressures.  If found to be elevated, we will discuss appropriate medications at her next visit.  4.  She had quit smoking approximately a year ago and was congratulated for this.  5.  Follow-up will be in approximately 6 to 8 weeks after completion of ZIO Patch.    Video-Visit Details    Type of service:  Video Visit    Video Start Time: 3:44 PM  Video End Time: 4:5 PM    Originating Location (pt. Location): Other Work    Distant Location (provider location):  [facility] AND \[facility] [place] [facility]\""     Mode of Communication:  Video Conference via [employer]      Benji Gold DO

## 2020-05-13 ENCOUNTER — HOSPITAL ENCOUNTER (OUTPATIENT)
Dept: CARDIOLOGY | Facility: OTHER | Age: 25
Discharge: HOME | End: 2020-05-13
Attending: INTERNAL MEDICINE | Admitting: INTERNAL MEDICINE
Payer: COMMERCIAL

## 2020-05-13 DIAGNOSIS — Z98.890 HISTORY OF CARDIAC RADIOFREQUENCY ABLATION: ICD-10-CM

## 2020-05-13 DIAGNOSIS — R00.2 PALPITATIONS: ICD-10-CM

## 2020-05-13 DIAGNOSIS — Z86.79 H/O SUPRAVENTRICULAR TACHYCARDIA: ICD-10-CM

## 2020-05-13 DIAGNOSIS — R00.2 PALPITATIONS: Primary | ICD-10-CM

## 2020-05-13 PROCEDURE — 0298T ZIO PATCH HOLTER ADULT PEDIATRIC GREATER THAN 48 HRS: CPT | Performed by: INTERNAL MEDICINE

## 2020-05-13 PROCEDURE — 0296T ZIO PATCH HOLTER ADULT PEDIATRIC GREATER THAN 48 HRS: CPT

## 2020-05-13 NOTE — PATIENT INSTRUCTIONS
Patient instructed not to:   -take baths, swim, sauna   -remove device prior to 14 days   -use electric blankets   -shower or sweat excessively within first 24 hours of device application  Patient instructed to:   -shower as needed   -be carefull when toweling off and dressing   -press button when cardiac symptoms occur   -document symptoms in log book   -remove and return device (send via mail to [employer])   -Call [employer] with any questions

## 2020-07-28 DIAGNOSIS — F41.1 GAD (GENERALIZED ANXIETY DISORDER): ICD-10-CM

## 2020-07-29 ENCOUNTER — MYC REFILL (OUTPATIENT)
Dept: FAMILY MEDICINE | Facility: OTHER | Age: 25
End: 2020-07-29

## 2020-07-29 DIAGNOSIS — F41.1 GAD (GENERALIZED ANXIETY DISORDER): ICD-10-CM

## 2020-07-31 NOTE — TELEPHONE ENCOUNTER
[facility] sent Rx request for the following:         Last Office Visit:             2020   Future Office visit:            none     Disp  Refills  Start  End  DREA    sertraline (ZOLOFT) 50 MG tablet  90 tablet  0  2020   No    Si tab daily    Sent to pharmacy as: sertraline (ZOLOFT) 50 MG tablet     Prescription approved per [facility] Refill Protocol.  Simi Boyd RN .............. 2020  8:32 AM

## 2020-08-26 ENCOUNTER — OFFICE VISIT (OUTPATIENT)
Dept: FAMILY MEDICINE | Facility: OTHER | Age: 25
End: 2020-08-26
Attending: PHYSICIAN ASSISTANT
Payer: COMMERCIAL

## 2020-08-26 VITALS
HEIGHT: 62 IN | BODY MASS INDEX: 53.92 KG/M2 | WEIGHT: 293 LBS | DIASTOLIC BLOOD PRESSURE: 90 MMHG | RESPIRATION RATE: 20 BRPM | SYSTOLIC BLOOD PRESSURE: 140 MMHG | TEMPERATURE: 98.2 F | OXYGEN SATURATION: 97 % | HEART RATE: 110 BPM

## 2020-08-26 DIAGNOSIS — J02.9 SORE THROAT: ICD-10-CM

## 2020-08-26 DIAGNOSIS — J02.0 STREPTOCOCCAL PHARYNGITIS: Primary | ICD-10-CM

## 2020-08-26 LAB
SPECIMEN SOURCE: ABNORMAL
STREP GROUP A PCR: ABNORMAL

## 2020-08-26 PROCEDURE — 99214 OFFICE O/P EST MOD 30 MIN: CPT | Performed by: PHYSICIAN ASSISTANT

## 2020-08-26 PROCEDURE — 87651 STREP A DNA AMP PROBE: CPT | Mod: ZL | Performed by: PHYSICIAN ASSISTANT

## 2020-08-26 PROCEDURE — G0463 HOSPITAL OUTPT CLINIC VISIT: HCPCS

## 2020-08-26 RX ORDER — AMOXICILLIN 500 MG/1
500 CAPSULE ORAL 2 TIMES DAILY
Qty: 20 CAPSULE | Refills: 0 | Status: SHIPPED | OUTPATIENT
Start: 2020-08-26 | End: 2020-09-05

## 2020-08-26 SDOH — HEALTH STABILITY: MENTAL HEALTH: HOW OFTEN DO YOU HAVE A DRINK CONTAINING ALCOHOL?: MONTHLY OR LESS

## 2020-08-26 SDOH — HEALTH STABILITY: MENTAL HEALTH: HOW OFTEN DO YOU HAVE 6 OR MORE DRINKS ON ONE OCCASION?: MONTHLY

## 2020-08-26 SDOH — HEALTH STABILITY: MENTAL HEALTH: HOW MANY STANDARD DRINKS CONTAINING ALCOHOL DO YOU HAVE ON A TYPICAL DAY?: 5 OR 6

## 2020-08-26 ASSESSMENT — MIFFLIN-ST. JEOR: SCORE: 2170.16

## 2020-08-26 ASSESSMENT — PAIN SCALES - GENERAL: PAINLEVEL: SEVERE PAIN (6)

## 2020-08-26 NOTE — NURSING NOTE
"Chief Complaint   Patient presents with     Pharyngitis     Patient presented to the clinic with a sore throat that started Monday night and has not gotten any better. She does not have a cough at this time, she did report having a fever last night of 100.4 and that her skin hurt to the touch.    Initial BP (!) 140/90 (BP Location: Right arm, Patient Position: Sitting, Cuff Size: Adult Regular)   Pulse 110   Temp 98.2  F (36.8  C) (Temporal)   Resp 20   Ht 1.575 m (5' 2\")   Wt 147.7 kg (325 lb 9.6 oz)   LMP 08/11/2020   SpO2 97%   Breastfeeding No   BMI 59.55 kg/m   Estimated body mass index is 59.55 kg/m  as calculated from the following:    Height as of this encounter: 1.575 m (5' 2\").    Weight as of this encounter: 147.7 kg (325 lb 9.6 oz).    Medication Reconciliation: complete      Dionne Mar LPN  "

## 2020-08-26 NOTE — PATIENT INSTRUCTIONS
Sinus congestion, fever, headache, sore throat  Strep test is positive   Start Amoxicillin 500 mg oral tablet, take twice daily for  10 days  For sore throat symptoms: rest, fluids, humidifier, OTC cepacol/chloriseptic, salt water gargles  For sinus symptoms: Warm compress, hot steamy shower  OTC guaifenesin (mucinex/robitussin)  OTC decongestant (sudafed),   OTC 3 day nasal spray - Afrin, OTC inhaled corticosteroid - Flonase,   OTC antihistamine - cetirizine as directed, OTC Nasal sinus rinse.   Ibuprofen or tylenol as directed for discomfort or fever  Return to clinic if symptoms persist or worsen    Patient Education     Pharyngitis: Strep (Confirmed)    You have had a positive test for strep throat. Strep throat is a contagious illness. It is spread by coughing, kissing or by touching others after touching your mouth or nose. Symptoms include throat pain that is worse with swallowing, aching all over, headache, and fever. It is treated with antibiotic medicine. This should help you start to feel better in 1 to 2 days.  Home care    Rest at home. Drink plenty of fluids to you won't get dehydrated.    No work or school for the first 2 days of taking the antibiotics. After this time, you will not be contagious. You can then return to school or work if you are feeling better.     Take antibiotic medicine for the full 10 days, even if you feel better. This is very important to ensure the infection is treated. It is also important to prevent medicine-resistant germs from developing. If you were given an antibiotic shot, you don't need any more antibiotics.    You may use acetaminophen or ibuprofen to control pain or fever, unless another medicine was prescribed for this. Talk with your healthcare provider before taking these medicines if you have chronic liver or kidney disease. Also talk with your healthcare provider if you have had a stomach ulcer or GI bleeding.    Throat lozenges or sprays help reduce pain.  Gargling with warm saltwater will also reduce throat pain. Dissolve 1/2 teaspoon of salt in 1 glass of warm water. This may be useful just before meals.     Soft foods are OK. Don't eat salty or spicy foods.  Follow-up care  Follow up with your healthcare provider or our staff if you don't get better over the next week.  When to seek medical advice  Call your healthcare provider right away if any of these occur:    Fever of 100.4 F (38 C) or higher, or as directed by your healthcare provider    New or worsening ear pain, sinus pain, or headache    Painful lumps in the back of neck    Stiff neck    Lymph nodes getting larger or becoming soft in the middle    You can't swallow liquids or you can't open your mouth wide because of throat pain    Signs of dehydration. These include very dark urine or no urine, sunken eyes, and dizziness.    Trouble breathing or noisy breathing    Muffled voice    Rash  Prevention  Here are steps you can take to help prevent an infection:    Keep good hand washing habits.    Don t have close contact with people who have sore throats, colds, or other upper respiratory infections.    Don t smoke, and stay away from secondhand smoke.  Date Last Reviewed: 11/1/2017 2000-2019 [employer]. [address] [place] [address], [place] [address]. All rights reserved. This information is not intended as a substitute for professional medical care. Always follow your healthcare professional's instructions.

## 2020-08-26 NOTE — PROGRESS NOTES
"SUBJECTIVE: 25 year old female with sore throat, headache, congestion, fever with T. Max 100.4, throat pain 6/10  Onset 8/24/2020., course is unchanged  Associated symptoms: as above    Exposures - finance has strep 2 weeks ago  Treatments - cold and flu medications, last dose > 6 hours PTA    Past Medical History:   Diagnosis Date     Antepartum hemorrhage 09/06/2016     Nicotine dependence, uncomplicated     has quit     Obesity complicating pregnancy 09/2016     Polyhydramnios      Supraventricular tachycardia (H) 2013     Underimmunization status     9/6/2016     Current Outpatient Medications   Medication     omeprazole (PRILOSEC) 20 MG DR capsule     sertraline (ZOLOFT) 50 MG tablet     metoprolol succinate ER (TOPROL-XL) 25 MG 24 hr tablet     No current facility-administered medications for this visit.       No Known Allergies      ROS  General: fatigue  HENT: POSITIVE per HPI  Respiratory: negative  Abdomen: negative  Skin: negative    OBJECTIVE:   Vitals:    08/26/20 1240   BP: (!) 140/90   BP Location: Right arm   Patient Position: Sitting   Cuff Size: Adult Regular   Pulse: 110   Resp: 20   Temp: 98.2  F (36.8  C)   TempSrc: Temporal   SpO2: 97%   Weight: 147.7 kg (325 lb 9.6 oz)   Height: 1.575 m (5' 2\")       Vitals as noted above.  Appears healthy, alert and NAD.  Ears: normal  Nose: nasal congestion/erythema, no sinus tendernes  Oropharynx: moderate erythema, s/p tonsilectomy  Neck: normal, supple and no adenopathy  Cardiac: normal RR, no murmur  Lungs: normal respiration, clear to ausculation  Skin: no rashes  Psychological: normal affect, alert and pleasant    Results for orders placed or performed in visit on 08/26/20   Group A Streptococcus PCR Throat Swab     Status: Abnormal    Specimen: Throat   Result Value Ref Range    Specimen Description Throat     Strep Group A PCR Detected, Abnormal Result (A) NDET^Not Detected         ASSESSMENT:      Diagnosis Comments   1. Streptococcal pharyngitis  " amoxicillin (AMOXIL) 500 MG capsule, 1 tablet twice daily x 10 days   2. Sore throat  Group A Streptococcus PCR Throat Swab          PLAN:    Sore throat x 3 days with fever  Rapid strep positive  RX per EPIC   Discussed symptomatic treatments per AVS  Follow up with PCP if symptoms persist or worsen  Patient received verbal and written instruction including review of warning signs    Floresita Lima PA-C on 8/26/2020 at 12:56 PM

## 2020-08-26 NOTE — LETTER
August 26, 2020      Simi Godwin  [address] [place] [id number]        To Whom It May Concern:    Simi Godwin  was seen on 8/26/2020.  Please excuse her until 8/28/2020 due to illness.        Sincerely,        Floresita Lima PA-C

## 2020-09-11 ENCOUNTER — OFFICE VISIT (OUTPATIENT)
Dept: FAMILY MEDICINE | Facility: OTHER | Age: 25
End: 2020-09-11
Attending: PHYSICIAN ASSISTANT
Payer: COMMERCIAL

## 2020-09-11 VITALS
HEIGHT: 63 IN | DIASTOLIC BLOOD PRESSURE: 86 MMHG | BODY MASS INDEX: 51.91 KG/M2 | RESPIRATION RATE: 20 BRPM | OXYGEN SATURATION: 97 % | SYSTOLIC BLOOD PRESSURE: 134 MMHG | WEIGHT: 293 LBS | HEART RATE: 88 BPM | TEMPERATURE: 97.6 F

## 2020-09-11 DIAGNOSIS — R07.0 THROAT PAIN: ICD-10-CM

## 2020-09-11 DIAGNOSIS — J02.0 STREPTOCOCCAL PHARYNGITIS: Primary | ICD-10-CM

## 2020-09-11 LAB
SPECIMEN SOURCE: ABNORMAL
STREP GROUP A PCR: ABNORMAL

## 2020-09-11 PROCEDURE — G0463 HOSPITAL OUTPT CLINIC VISIT: HCPCS | Mod: 25

## 2020-09-11 PROCEDURE — 25000128 H RX IP 250 OP 636: Performed by: PHYSICIAN ASSISTANT

## 2020-09-11 PROCEDURE — 87651 STREP A DNA AMP PROBE: CPT | Mod: ZL | Performed by: PHYSICIAN ASSISTANT

## 2020-09-11 PROCEDURE — 96372 THER/PROPH/DIAG INJ SC/IM: CPT

## 2020-09-11 PROCEDURE — 99214 OFFICE O/P EST MOD 30 MIN: CPT | Performed by: PHYSICIAN ASSISTANT

## 2020-09-11 PROCEDURE — G0463 HOSPITAL OUTPT CLINIC VISIT: HCPCS

## 2020-09-11 RX ADMIN — PENICILLIN G BENZATHINE 1.2 MILLION UNITS: 1200000 INJECTION, SUSPENSION INTRAMUSCULAR at 11:51

## 2020-09-11 ASSESSMENT — PAIN SCALES - GENERAL: PAINLEVEL: MODERATE PAIN (4)

## 2020-09-11 ASSESSMENT — MIFFLIN-ST. JEOR: SCORE: 2214.39

## 2020-09-11 NOTE — PROGRESS NOTES
"SUBJECTIVE: 25 year old female with sore throat with 4/10 severity.   Onset last night  Associated symptoms: as above  She tested positive for strep on 8/26/2020 and was treated with a 10 day course of amoxicillin. She feels her current symptoms feel the same as when she had strep    Exposures - not known  Treatments - nothing today    Past Medical History:   Diagnosis Date     Antepartum hemorrhage 09/06/2016     Nicotine dependence, uncomplicated     has quit     Obesity complicating pregnancy 09/2016     Polyhydramnios      Supraventricular tachycardia (H) 2013     Underimmunization status     9/6/2016     Current Outpatient Medications   Medication     omeprazole (PRILOSEC) 20 MG DR capsule     sertraline (ZOLOFT) 50 MG tablet     metoprolol succinate ER (TOPROL-XL) 25 MG 24 hr tablet     No current facility-administered medications for this visit.       No Known Allergies      ROS  General: no fever  HENT: POSITIVE per HPI  Respiratory: negative  Abdomen: negative  Skin: negative    OBJECTIVE:   Vitals:    09/11/20 1057   BP: 134/86   Pulse: 88   Resp: 20   Temp: 97.6  F (36.4  C)   TempSrc: Tympanic   SpO2: 97%   Weight: (!) 151.3 kg (333 lb 9.6 oz)   Height: 1.588 m (5' 2.5\")       Vitals as noted above.  Appears healthy, alert and NAD.  Ears: normal  Oropharynx: marked erythema  Neck: normal, supple and no adenopathy  Cardiac: normal RR, no murmur  Lungs: normal respiration, clear to ausculation  Skin: no rashes  Psychological: normal affect, alert and pleasant    Results for orders placed or performed in visit on 09/11/20   Group A Streptococcus PCR Throat Swab     Status: Abnormal    Specimen: Throat   Result Value Ref Range    Specimen Description Throat     Strep Group A PCR Detected, Abnormal Result (A) NDET^Not Detected         ASSESSMENT:      Diagnosis Comments   1. Streptococcal pharyngitis  penicillin G benzathine (BICILLIN L-A) injection 1.2 Million Units    2. Throat pain  Group A Streptococcus " PCR Throat Swab          PLAN:  Sore throat with onset yesterday:   Rapid strep positive  RX per EPIC   Discussed symptomatic treatments per AVS  Follow up with PCP if symptoms persist or worsen  Patient received verbal and written instruction including review of warning signs    Floresita Lima PA-C on 9/11/2020 at 11:02 AM

## 2020-09-11 NOTE — PATIENT INSTRUCTIONS
Sore throat  Rapid strep screen is positive  Bicillin LA 1.2 million units IM in clinic today  Get a new tooth brush is 2 days  Warm fluids, cold soft foods, salt water gargles, humidified air. OTC throat sprays or throat lozenges as needed  Ibuprofen or tylenol as needed for discomfort or fever  Return to clinic if symptoms persist or worsen  Seek immediate care for    Fever of 100.4 F (38 C) or higher, or as directed by your healthcare provider    New or worsening ear pain, sinus pain, or headache    Painful lumps in the back of neck    Stiff neck    Lymph nodes getting larger or becoming soft in the middle    You can't swallow liquids or you can't open your mouth wide because of throat pain    Signs of dehydration. These include very dark urine or no urine, sunken eyes, and dizziness.    Trouble breathing or noisy breathing    Muffled voice    Rash    Patient Education     Pharyngitis: Strep (Confirmed)    You have had a positive test for strep throat. Strep throat is a contagious illness. It is spread by coughing, kissing or by touching others after touching your mouth or nose. Symptoms include throat pain that is worse with swallowing, aching all over, headache, and fever. It is treated with antibiotic medicine. This should help you start to feel better in 1 to 2 days.  Home care    Rest at home. Drink plenty of fluids to you won't get dehydrated.    No work or school for the first 2 days of taking the antibiotics. After this time, you will not be contagious. You can then return to school or work if you are feeling better.     Take antibiotic medicine for the full 10 days, even if you feel better. This is very important to ensure the infection is treated. It is also important to prevent medicine-resistant germs from developing. If you were given an antibiotic shot, you don't need any more antibiotics.    You may use acetaminophen or ibuprofen to control pain or fever, unless another medicine was prescribed for  this. Talk with your healthcare provider before taking these medicines if you have chronic liver or kidney disease. Also talk with your healthcare provider if you have had a stomach ulcer or GI bleeding.    Throat lozenges or sprays help reduce pain. Gargling with warm saltwater will also reduce throat pain. Dissolve 1/2 teaspoon of salt in 1 glass of warm water. This may be useful just before meals.     Soft foods are OK. Don't eat salty or spicy foods.  Follow-up care  Follow up with your healthcare provider or our staff if you don't get better over the next week.  When to seek medical advice  Call your healthcare provider right away if any of these occur:    Fever of 100.4 F (38 C) or higher, or as directed by your healthcare provider    New or worsening ear pain, sinus pain, or headache    Painful lumps in the back of neck    Stiff neck    Lymph nodes getting larger or becoming soft in the middle    You can't swallow liquids or you can't open your mouth wide because of throat pain    Signs of dehydration. These include very dark urine or no urine, sunken eyes, and dizziness.    Trouble breathing or noisy breathing    Muffled voice    Rash  Prevention  Here are steps you can take to help prevent an infection:    Keep good hand washing habits.    Don t have close contact with people who have sore throats, colds, or other upper respiratory infections.    Don t smoke, and stay away from secondhand smoke.  Date Last Reviewed: 11/1/2017 2000-2019 [employer]. [address] [place] [address], [place] [address]. All rights reserved. This information is not intended as a substitute for professional medical care. Always follow your healthcare professional's instructions.

## 2020-09-11 NOTE — NURSING NOTE
"Chief Complaint   Patient presents with     Throat Problem     Patient is here for a sore throat that started last night. Patient states she had strep 2 weeks ago and the symptoms did go away then.    Initial /86   Pulse 88   Temp 97.6  F (36.4  C) (Tympanic)   Resp 20   Ht 1.588 m (5' 2.5\")   Wt (!) 151.3 kg (333 lb 9.6 oz)   SpO2 97%   BMI 60.04 kg/m   Estimated body mass index is 60.04 kg/m  as calculated from the following:    Height as of this encounter: 1.588 m (5' 2.5\").    Weight as of this encounter: 151.3 kg (333 lb 9.6 oz).  Medication Reconciliation: complete    Karina Ingram LPN  "

## 2020-10-27 DIAGNOSIS — F41.1 GAD (GENERALIZED ANXIETY DISORDER): ICD-10-CM

## 2020-10-27 NOTE — TELEPHONE ENCOUNTER
[facility] for Sertraline 50 mg tabs

## 2020-10-27 NOTE — TELEPHONE ENCOUNTER
" Disp Refills Start End DREA   sertraline (ZOLOFT) 50 MG tablet 90 tablet 0 2020  No   Si tab daily       LOV: 2020  Future Office visit:  No future appointment scheduled at this time.     Requested Prescriptions   Pending Prescriptions Disp Refills     sertraline (ZOLOFT) 50 MG tablet 90 tablet 0     Si tab daily       SSRIs Protocol Passed - 10/27/2020  4:12 PM        Passed - Recent (12 mo) or future (30 days) visit within the authorizing provider's specialty     Patient has had an office visit with the authorizing provider or a provider within the authorizing providers department within the previous 12 mos or has a future within next 30 days. See \"Patient Info\" tab in inbasket, or \"Choose Columns\" in Meds & Orders section of the refill encounter.              Passed - Medication is active on med list        Passed - Patient is age 18 or older        Passed - No active pregnancy on record        Passed - No positive pregnancy test in last 12 months         Dorothea Rea RN  ....................  10/27/2020   4:44 PM      "

## 2020-11-18 ENCOUNTER — MYC MEDICAL ADVICE (OUTPATIENT)
Dept: FAMILY MEDICINE | Facility: OTHER | Age: 25
End: 2020-11-18

## 2020-11-19 NOTE — TELEPHONE ENCOUNTER
Would need form from her landlord to complete. Some require a statement that they are disabled from mental health condition and I would not be willing to do that.    Please have her send form    Sho Joya MD

## 2020-12-27 ENCOUNTER — HEALTH MAINTENANCE LETTER (OUTPATIENT)
Age: 25
End: 2020-12-27

## 2021-01-25 DIAGNOSIS — F41.1 GAD (GENERALIZED ANXIETY DISORDER): ICD-10-CM

## 2021-01-25 NOTE — TELEPHONE ENCOUNTER
" Disp Refills Start End DREA   sertraline (ZOLOFT) 50 MG tablet 90 tablet 0 10/27/2020  No   Si tab daily       LOV: 2020  Future Office visit:  No future appointment scheduled at this time.     Limited refill sent.    Requested Prescriptions   Pending Prescriptions Disp Refills     sertraline (ZOLOFT) 50 MG tablet 90 tablet 0     Si tab daily       SSRIs Protocol Passed - 2021  3:49 PM        Passed - Recent (12 mo) or future (30 days) visit within the authorizing provider's specialty     Patient has had an office visit with the authorizing provider or a provider within the authorizing providers department within the previous 12 mos or has a future within next 30 days. See \"Patient Info\" tab in inbasket, or \"Choose Columns\" in Meds & Orders section of the refill encounter.              Passed - Medication is active on med list        Passed - Patient is age 18 or older        Passed - No active pregnancy on record        Passed - No positive pregnancy test in last 12 months         Dorothea Rea RN  ....................  2021   3:54 PM      "

## 2021-04-25 DIAGNOSIS — F41.1 GAD (GENERALIZED ANXIETY DISORDER): ICD-10-CM

## 2021-04-25 NOTE — LETTER
May 18, 2021      Simi Godwin   [address] [place] [address]        Dear Simi,       A refill of Sertraline has been requested by your pharmacy in April. We  We noticed that it has been greater than 12 months since your last comprehensive visit and labs with Jim Joya MD.  A limited 90 day supply has been sent to your pharmacy in April.    Additional refills require a medication management appointment.  Your health is very important to us.  Please call the clinic at [phone] to schedule your appointment.    Thank you,     The Refill Nurse  [facility]

## 2021-04-25 NOTE — LETTER
April 26, 2021      Simi Godwin  [address] [address]        Dear Simi,     This letter is to remind you that you are due for your annual exam with Sho Alejandra. Your last comprehensive visit was more than 12 months ago.    A LIMITED refill of Sertraline 50 mg has been called into your pharmacy. Additional refills require you to complete this appointment.    Please call the clinic at [phone] to schedule your appointment.    If you should require additional refills before your scheduled appointment, please contact your pharmacy and we will refill your medication until the date of your appointment.    Thank you for choosing [facility] and [facility] for your health care needs.    Sincerely,    Refill RN  [facility]

## 2021-04-26 NOTE — TELEPHONE ENCOUNTER
[facility] sent Rx request for the following:      Requested Prescriptions   Pending Prescriptions Disp Refills     sertraline (ZOLOFT) 50 MG tablet [Pharmacy Med Name: SERTRALINE 50MG TABLETS] 90 tablet 0     Sig: TAKE 1 TABLET BY MOUTH DAILY          Last Prescription Date:   1/25/21  Last Fill Qty/Refills:         90, R-1  Last Office Visit:              4/23/20  Future Office visit:           None  Routing refill request to provider for review/approval because:      Letter sent to patient regarding annual exam needed for additional refills. Will route to provider for review and consideration. Deann Mullins RN on 4/26/2021 at 10:22 AM

## 2021-05-18 NOTE — TELEPHONE ENCOUNTER
Letter returned in mail with return to sender on envelope. Verified name/ on letter. Noted updated addresses on file. Resent letter with updated address.   Velma Venegas RN ,....................  2021   1:24 PM

## 2021-07-23 DIAGNOSIS — F41.1 GAD (GENERALIZED ANXIETY DISORDER): ICD-10-CM

## 2021-07-23 NOTE — TELEPHONE ENCOUNTER
" Disp Refills Start End DREA   sertraline (ZOLOFT) 50 MG tablet 90 tablet 0 4/26/2021  No   Sig: TAKE 1 TABLET BY MOUTH DAILY       LOV: 9/11/2020  Future Office visit: No future appointment scheduled at this time.      Routing refill request to provider for review/approval.    Requested Prescriptions   Pending Prescriptions Disp Refills     sertraline (ZOLOFT) 50 MG tablet [Pharmacy Med Name: SERTRALINE 50MG TABLETS] 90 tablet 0     Sig: TAKE 1 TABLET BY MOUTH DAILY       SSRIs Protocol Passed - 7/23/2021  4:04 AM        Passed - Recent (12 mo) or future (30 days) visit within the authorizing provider's specialty     Patient has had an office visit with the authorizing provider or a provider within the authorizing providers department within the previous 12 mos or has a future within next 30 days. See \"Patient Info\" tab in inbasket, or \"Choose Columns\" in Meds & Orders section of the refill encounter.              Passed - Medication is active on med list        Passed - Patient is age 18 or older        Passed - No active pregnancy on record        Passed - No positive pregnancy test in last 12 months           Unable to complete prescription refill per RN Medication Refill Policy.................... Dorothea Rea RN ....................  7/23/2021   10:24 AM        "

## 2021-10-09 ENCOUNTER — HEALTH MAINTENANCE LETTER (OUTPATIENT)
Age: 26
End: 2021-10-09

## 2021-10-20 ENCOUNTER — ALLIED HEALTH/NURSE VISIT (OUTPATIENT)
Dept: FAMILY MEDICINE | Facility: OTHER | Age: 26
End: 2021-10-20
Attending: FAMILY MEDICINE
Payer: COMMERCIAL

## 2021-10-20 DIAGNOSIS — J02.0 STREPTOCOCCAL SORE THROAT: Primary | ICD-10-CM

## 2021-10-20 DIAGNOSIS — R53.83 FATIGUE, UNSPECIFIED TYPE: ICD-10-CM

## 2021-10-20 PROCEDURE — U0005 INFEC AGEN DETEC AMPLI PROBE: HCPCS | Mod: ZL

## 2021-10-20 PROCEDURE — C9803 HOPD COVID-19 SPEC COLLECT: HCPCS

## 2021-10-22 LAB — SARS-COV-2 RNA RESP QL NAA+PROBE: POSITIVE

## 2021-10-26 DIAGNOSIS — F41.1 GAD (GENERALIZED ANXIETY DISORDER): ICD-10-CM

## 2021-10-26 NOTE — LETTER
October 27, 2021      Simi Godwin   [address] [place] [address]        Dear Simi,     A refill request was received from your pharmacy for Sertraline.    Additional refills require an office visit with Dr. Joya for annual review.    Please call [phone] to schedule an appointment.        Sincerely,      Refill Nurse

## 2021-10-27 ENCOUNTER — MYC REFILL (OUTPATIENT)
Dept: FAMILY MEDICINE | Facility: OTHER | Age: 26
End: 2021-10-27

## 2021-10-27 DIAGNOSIS — F41.1 GAD (GENERALIZED ANXIETY DISORDER): ICD-10-CM

## 2021-10-27 NOTE — TELEPHONE ENCOUNTER
Routing refill request to provider for review/approval because:  LOV: 4/23/2020 VV  Patient due for annual review  Letter and my chart message sent.    Jennifer Arcos RN on 10/27/2021 at 2:16 PM

## 2021-10-28 NOTE — TELEPHONE ENCOUNTER
"Requested Prescriptions   Pending Prescriptions Disp Refills     sertraline (ZOLOFT) 50 MG tablet 90 tablet 0     Sig: Take 1 tablet (50 mg) by mouth daily       SSRIs Protocol Passed - 10/27/2021  1:20 PM        Passed - Recent (12 mo) or future (30 days) visit within the authorizing provider's specialty     Patient has had an office visit with the authorizing provider or a provider within the authorizing providers department within the previous 12 mos or has a future within next 30 days. See \"Patient Info\" tab in inbasket, or \"Choose Columns\" in Meds & Orders section of the refill encounter.              Passed - Medication is active on med list        Passed - Patient is age 18 or older        Passed - No active pregnancy on record        Passed - No positive pregnancy test in last 12 months     Last Written Prescription Date:  7/23/21  Last Fill Quantity: 90,   # refills: 0  Last Office Visit: 9/11/20 [place]  Future Office visit: none      Routing refill request to provider for review/approval because:  Unable to fill prescription refills per RN Medical Refill Policy.  Brenda J. Goodell, RN on 10/28/2021 at 8:30 AM      "

## 2021-10-29 ENCOUNTER — TELEPHONE (OUTPATIENT)
Dept: FAMILY MEDICINE | Facility: OTHER | Age: 26
End: 2021-10-29

## 2021-10-29 NOTE — TELEPHONE ENCOUNTER
Not concerned if it was a small amount and only associated with severe coughing spell. Probably not from lungs rather nasopharynx.  Monitor for worsening SOB    Sho Joya MD

## 2021-10-29 NOTE — TELEPHONE ENCOUNTER
Verified patients name and date of birth. Relayed providers response to patient. Patient verbalized understanding and agreeable. Dorothea Rea RN  ....................  10/29/2021   2:03 PM

## 2021-10-29 NOTE — TELEPHONE ENCOUNTER
S-(situation): Bad cough noticing blood in sputum     B-(background): Covid-19 positive 10/20/2021    A-(assessment): Patient is able to speak complete sentences with out extra breaths. Alert and orientated. Congestion. Reports dizziness off and on. No fever. Reported having blood in her spit after coughing two times since yesterday. Patient described it like when you brush your teeth to hard and your gums bleed.      R-(recommendations): Due to inadequate protocol to triage from will route to provider to review. Dorothea Rea RN  ....................  10/29/2021   12:16 PM

## 2021-12-16 ENCOUNTER — VIRTUAL VISIT (OUTPATIENT)
Dept: FAMILY MEDICINE | Facility: OTHER | Age: 26
End: 2021-12-16
Attending: FAMILY MEDICINE
Payer: COMMERCIAL

## 2021-12-16 VITALS — BODY MASS INDEX: 60.3 KG/M2 | WEIGHT: 293 LBS

## 2021-12-16 DIAGNOSIS — F41.1 GAD (GENERALIZED ANXIETY DISORDER): ICD-10-CM

## 2021-12-16 PROCEDURE — 99442 PR PHYSICIAN TELEPHONE EVALUATION 11-20 MIN: CPT | Performed by: FAMILY MEDICINE

## 2021-12-16 ASSESSMENT — ANXIETY QUESTIONNAIRES
6. BECOMING EASILY ANNOYED OR IRRITABLE: NOT AT ALL
2. NOT BEING ABLE TO STOP OR CONTROL WORRYING: SEVERAL DAYS
3. WORRYING TOO MUCH ABOUT DIFFERENT THINGS: SEVERAL DAYS
5. BEING SO RESTLESS THAT IT IS HARD TO SIT STILL: NOT AT ALL
GAD7 TOTAL SCORE: 4
7. FEELING AFRAID AS IF SOMETHING AWFUL MIGHT HAPPEN: SEVERAL DAYS
1. FEELING NERVOUS, ANXIOUS, OR ON EDGE: SEVERAL DAYS

## 2021-12-16 ASSESSMENT — PATIENT HEALTH QUESTIONNAIRE - PHQ9
5. POOR APPETITE OR OVEREATING: NOT AT ALL
SUM OF ALL RESPONSES TO PHQ QUESTIONS 1-9: 4

## 2021-12-16 NOTE — PROGRESS NOTES
Laurel is a 26 year old who is being evaluated via a billable telephone visit.      What phone number would you like to be contacted at? [phone]  How would you like to obtain your AVS? MyChart    Assessment & Plan     GINO (generalized anxiety disorder)  Doing well with Zoloft 50 mg daily.  Refilled for 1 year.  Reinforce lifestyle changes including healthy diet exercise and adequate sleep.  She will be due for Pap smear next year  - sertraline (ZOLOFT) 50 MG tablet; Take 1 tablet (50 mg) by mouth daily                 No follow-ups on file.    Sho Joya MD  [facility] AND [facility]    Subjective   Laurel is a 26 year old who presents for the following health issues     HPI    25 yo female presents via telephone visit for follow-up of GINO.    Started on Zoloft 18 months ago, increased dosage 50 mg daily and seems to working well.    Denies side effects.  No suicidal thoughts,        Anxiety Follow-Up    How are you doing with your anxiety since your last visit? No change    Are you having other symptoms that might be associated with anxiety? No    Have you had a significant life event? Relationship Concerns and Financial Concerns     Are you feeling depressed? No    Do you have any concerns with your use of alcohol or other drugs? No    Social History     Tobacco Use     Smoking status: Former Smoker     Packs/day: 0.25     Years: 2.00     Pack years: 0.50     Types: Vaping Device     Quit date: 2019     Years since quittin.9     Smokeless tobacco: Never Used     Tobacco comment: 1-3 a day   Substance Use Topics     Alcohol use: Yes     Alcohol/week: 0.0 standard drinks     Comment: occasional -1-2 drinks a week     Drug use: No     Comment: Drug use: No     GINO-7 SCORE 2018   Total Score 0 3 4     PHQ 2021   PHQ-9 Total Score 0 2 4   Q9: Thoughts of better off dead/self-harm past 2 weeks Not at all Not at all Not at all     Last  PHQ-9 12/16/2021   1.  Little interest or pleasure in doing things 0   2.  Feeling down, depressed, or hopeless 1   3.  Trouble falling or staying asleep, or sleeping too much 0   4.  Feeling tired or having little energy 1   5.  Poor appetite or overeating 1   6.  Feeling bad about yourself 1   7.  Trouble concentrating 0   8.  Moving slowly or restless 0   Q9: Thoughts of better off dead/self-harm past 2 weeks 0   PHQ-9 Total Score 4   Difficulty at work, home, or with people -     GINO-7  12/16/2021   1. Feeling nervous, anxious, or on edge 1   2. Not being able to stop or control worrying 1   3. Worrying too much about different things 1   4. Trouble relaxing 0   5. Being so restless that it is hard to sit still 0   6. Becoming easily annoyed or irritable 0   7. Feeling afraid, as if something awful might happen 1   GINO-7 Total Score 4   If you checked any problems, how difficult have they made it for you to do your work, take care of things at home, or get along with other people? -       How many servingsReview of Systems   Constitutional, HEENT, cardiovascular, pulmonary, gi and gu systems are negative, except as otherwise noted.      Objective           Vitals:  No vitals were obtained today due to virtual visit.    Physical Exam   healthy, alert and no distress  PSYCH: Alert and oriented times 3; coherent speech, normal   rate and volume, able to articulate logical thoughts, able   to abstract reason, no tangential thoughts, no hallucinations   or delusions  Her affect is normal  RESP: No cough, no audible wheezing, able to talk in full sentences  Remainder of exam unable to be completed due to telephone visits                Phone call duration: 11 minutes

## 2021-12-17 ASSESSMENT — ANXIETY QUESTIONNAIRES: GAD7 TOTAL SCORE: 4

## 2022-01-29 ENCOUNTER — HEALTH MAINTENANCE LETTER (OUTPATIENT)
Age: 27
End: 2022-01-29

## 2022-07-02 ENCOUNTER — OFFICE VISIT (OUTPATIENT)
Dept: FAMILY MEDICINE | Facility: OTHER | Age: 27
End: 2022-07-02
Attending: NURSE PRACTITIONER
Payer: COMMERCIAL

## 2022-07-02 VITALS
OXYGEN SATURATION: 97 % | TEMPERATURE: 98.2 F | DIASTOLIC BLOOD PRESSURE: 82 MMHG | HEART RATE: 100 BPM | SYSTOLIC BLOOD PRESSURE: 136 MMHG | WEIGHT: 293 LBS | BODY MASS INDEX: 63.39 KG/M2 | RESPIRATION RATE: 20 BRPM

## 2022-07-02 DIAGNOSIS — J02.9 SORE THROAT: ICD-10-CM

## 2022-07-02 DIAGNOSIS — J02.9 VIRAL PHARYNGITIS: Primary | ICD-10-CM

## 2022-07-02 DIAGNOSIS — R09.82 POST-NASAL DRAINAGE: ICD-10-CM

## 2022-07-02 DIAGNOSIS — Z01.89 PATIENT REQUEST FOR DIAGNOSTIC TESTING: ICD-10-CM

## 2022-07-02 LAB — GROUP A STREP BY PCR: NOT DETECTED

## 2022-07-02 PROCEDURE — 87651 STREP A DNA AMP PROBE: CPT | Mod: ZL | Performed by: NURSE PRACTITIONER

## 2022-07-02 PROCEDURE — G0463 HOSPITAL OUTPT CLINIC VISIT: HCPCS

## 2022-07-02 PROCEDURE — 99213 OFFICE O/P EST LOW 20 MIN: CPT | Performed by: NURSE PRACTITIONER

## 2022-07-02 ASSESSMENT — PAIN SCALES - GENERAL: PAINLEVEL: SEVERE PAIN (6)

## 2022-07-02 NOTE — NURSING NOTE
"Chief Complaint   Patient presents with     Throat Pain     Patient is here for a sore throat that started on Wednesday. Patient has been taking Ibuprofen with some relief. Patient last had ibuprofen at 9:45am.     Initial /82   Pulse 100   Temp 98.2  F (36.8  C) (Tympanic)   Resp 20   Wt (!) 159.8 kg (352 lb 3.2 oz)   SpO2 97%   BMI 63.39 kg/m   Estimated body mass index is 63.39 kg/m  as calculated from the following:    Height as of 9/11/20: 1.588 m (5' 2.5\").    Weight as of this encounter: 159.8 kg (352 lb 3.2 oz).  Medication Reconciliation: complete    Karina Ingram LPN  "

## 2022-07-02 NOTE — PROGRESS NOTES
ASSESSMENT/PLAN:     I have reviewed the nursing notes.  I have reviewed the findings, diagnosis, plan and need for follow up with the patient.      1. Patient request for diagnostic testing    - Group A Streptococcus PCR Throat Swab    2. Sore throat    - Group A Streptococcus PCR Throat Swab    3. Post-nasal drainage    Likely secondary to allergies or viral illness  May use OTC antihistamine such as Zyrtec, Claritin, Allegra or Benadryl PRN  May use OTC Flonase or Nasacort spray daily PRN    4. Viral pharyngitis    Negative strep PCR test     Discussed with patient that symptoms and exam are consistent with viral illness.    No clinical indications for antibiotic treatment at this time.      Symptomatic treatment - Encouraged fluids, salt water gargles, honey, elevation, humidifier, saline nasal spray, sinus rinse/netti pot, lozenges, tea, soup, smoothies, popsicles, topical vapor rub, rest, etc     May use over-the-counter Tylenol or ibuprofen PRN    Discussed warning signs/symptoms indicative of need to f/u  Follow up if symptoms persist or worsen or concerns      I explained my diagnostic considerations and recommendations to the patient, who voiced understanding and agreement with the treatment plan. All questions were answered. We discussed potential side effects of any prescribed or recommended therapies, as well as expectations for response to treatments.    Sadie Toscano NP  [facility] AND [facility]      SUBJECTIVE:   Simi Godwin is a 27 year old female who presents to clinic today for the following health issues:  Strep test    HPI  Sore throat x 3 days.  Staying the same intensity. Painful to swallow.  No choking.  No fevers.  Headache yesterday.  No ear pain. gurgling in right ear today.   Intermittent stuffy and runny nose.  No cough.  No chest tightness, heaviness or shortness of breath.  No nausea, vomiting or decreased appetite.  Energy at baseline.  Taking Ibuprofen with decrease  in pain.         Past Medical History:   Diagnosis Date     Antepartum hemorrhage 2016     Nicotine dependence, uncomplicated     has quit     Obesity complicating pregnancy 2016     Polyhydramnios      Supraventricular tachycardia (H)      Underimmunization status     2016     Past Surgical History:   Procedure Laterality Date      SECTION N/A 3/28/2019    Procedure:  SECTION;  Surgeon: Tomer Pereira MD;  Location: [facility] OR     EP ABLATION AVNRT       RELEASE CARPAL TUNNEL       TONSILLECTOMY       Social History     Tobacco Use     Smoking status: Former Smoker     Packs/day: 0.25     Years: 2.00     Pack years: 0.50     Types: Vaping Device     Quit date: 2019     Years since quitting: 3.4     Smokeless tobacco: Never Used     Tobacco comment: 1-3 a day   Substance Use Topics     Alcohol use: Yes     Alcohol/week: 0.0 standard drinks     Comment: occasional -1-2 drinks a week     Current Outpatient Medications   Medication Sig Dispense Refill     Ferrous Sulfate (IRON PO)        omeprazole (PRILOSEC) 20 MG DR capsule Take 20 mg by mouth       sertraline (ZOLOFT) 50 MG tablet TAKE 1 TABLET BY MOUTH DAILY 90 tablet 0     sertraline (ZOLOFT) 50 MG tablet Take 1 tablet (50 mg) by mouth daily 90 tablet 3     sertraline (ZOLOFT) 50 MG tablet Take 1 tablet (50 mg) by mouth daily 90 tablet 1     No Known Allergies      Past medical history, past surgical history, current medications and allergies reviewed and accurate to the best of my knowledge.        OBJECTIVE:     /82   Pulse 100   Temp 98.2  F (36.8  C) (Tympanic)   Resp 20   Wt (!) 159.8 kg (352 lb 3.2 oz)   SpO2 97%   BMI 63.39 kg/m    Body mass index is 63.39 kg/m .     Physical Exam  General Appearance: Well appearing adult female, appropriate appearance for age. No acute distress  Ears: Left TM intact with bony landmarks appreciated, no erythema, no effusion, no bulging, no purulence.  Right TM  intact with bony landmarks appreciated, no erythema, no effusion, no bulging, no purulence.  Left auditory canal clear without drainage or bleeding.  Right auditory canal clear without drainage or bleeding.  Normal external ears, non tender.  Eyes: conjunctivae normal without erythema or irritation, corneas clear, no drainage or crusting, no eyelid swelling, pupils equal   Orophayrnx: moist mucous membranes, soft palate and pre-tonsillar area with erythema, pharynx with erythema, tonsils surgically absent, no palate petechiae, uvula with erythema and mucosal ulcer, uvula midline, thick post nasal drip seen, no trismus, voice clear.    Nose:  Mild drainage and congestion   Neck: supple without adenopathy  Respiratory: normal chest wall and respirations.  Normal effort.  Clear to auscultation bilaterally, no wheezing, crackles or rhonchi.  No increased work of breathing.  No cough appreciated.  Cardiac: RRR with no murmurs  Musculoskeletal:  Equal movement of bilateral upper extremities.  Equal movement of bilateral lower extremities.  Normal gait.    Psychological: normal affect, alert, oriented, and pleasant.       Labs:  Results for orders placed or performed in visit on 07/02/22   Group A Streptococcus PCR Throat Swab     Status: Normal    Specimen: Throat; Swab   Result Value Ref Range    Group A strep by PCR Not Detected Not Detected    Narrative    The Xpert Xpress Strep A test, performed on the [employer], is a rapid, qualitative in vitro diagnostic test for the detection of Streptococcus pyogenes (Group A ß-hemolytic Streptococcus, Strep A) in throat swab specimens from patients with signs and symptoms of pharyngitis. The Xpert Xpress Strep A test can be used as an aid in the diagnosis of Group A Streptococcal pharyngitis. The assay is not intended to monitor treatment for Group A Streptococcus infections. The Xpert Xpress Strep A test utilizes an automated real-time polymerase chain  reaction (PCR) to detect Streptococcus pyogenes DNA.

## 2022-09-06 NOTE — PLAN OF CARE
Assessment done, Vss. See flowsheet for further info. Taking percocet po to control her pain. Ambulating W/O difficulty, voiding, tolerating a regular diet. Plans to shower soon.  at bedside. Will continue to monitor.    Purple DH (Discharge Huddle; Vulnerable Patient)

## 2022-09-17 ENCOUNTER — HEALTH MAINTENANCE LETTER (OUTPATIENT)
Age: 27
End: 2022-09-17

## 2022-12-01 DIAGNOSIS — F41.1 GAD (GENERALIZED ANXIETY DISORDER): ICD-10-CM

## 2022-12-01 NOTE — TELEPHONE ENCOUNTER
Routing refill request to provider for review/approval because:    LOV; 12/16/21  Patient due for annual review  Letter and my chart message sent   Will route to outreach to call patient and help assist in scheduling appointment.    Jennifer Arcos RN on 12/1/2022 at 12:15 PM

## 2022-12-01 NOTE — LETTER
December 1, 2022      Simi Godwin   [address] [place] [id number]        Dear Simi,       This letter is to remind you that you are due for your annual exam with Sho Alejandra. Your last comprehensive visit was more than 12 months ago.    A LIMITED refill of Sertraline has been called into your pharmacy. Additional refills require you to complete this appointment.    Please call the clinic at [phone] to schedule your appointment.    If you should require additional refills before your scheduled appointment, please contact your pharmacy and we will refill your medication until the date of your appointment.    If you are no longer seeing Sho Alejandra for primary care, please call to let us know.       Thank you for choosing [facility] and [facility] for your health care needs.    Sincerely,    Refill LIZETTE  [facility]

## 2022-12-05 NOTE — TELEPHONE ENCOUNTER
LMTCB to schedule appointment.    Pt is due for annual exam. Letter and MyChart message sent 12/1/22.    Earnestine Hall on 12/5/2022 at 1:15 PM

## 2022-12-09 ENCOUNTER — OFFICE VISIT (OUTPATIENT)
Dept: FAMILY MEDICINE | Facility: OTHER | Age: 27
End: 2022-12-09
Attending: FAMILY MEDICINE
Payer: COMMERCIAL

## 2022-12-09 VITALS
WEIGHT: 293 LBS | OXYGEN SATURATION: 97 % | TEMPERATURE: 99 F | BODY MASS INDEX: 53.92 KG/M2 | DIASTOLIC BLOOD PRESSURE: 98 MMHG | RESPIRATION RATE: 20 BRPM | SYSTOLIC BLOOD PRESSURE: 154 MMHG | HEIGHT: 62 IN | HEART RATE: 91 BPM

## 2022-12-09 DIAGNOSIS — Z11.59 NEED FOR HEPATITIS C SCREENING TEST: ICD-10-CM

## 2022-12-09 DIAGNOSIS — Z82.49 FAMILY HISTORY OF BLOOD CLOTS: ICD-10-CM

## 2022-12-09 DIAGNOSIS — E11.9 TYPE 2 DIABETES MELLITUS WITHOUT COMPLICATION, WITHOUT LONG-TERM CURRENT USE OF INSULIN (H): ICD-10-CM

## 2022-12-09 DIAGNOSIS — Z00.00 ANNUAL PHYSICAL EXAM: Primary | ICD-10-CM

## 2022-12-09 DIAGNOSIS — F41.1 GAD (GENERALIZED ANXIETY DISORDER): ICD-10-CM

## 2022-12-09 DIAGNOSIS — Z12.4 CERVICAL CANCER SCREENING: ICD-10-CM

## 2022-12-09 DIAGNOSIS — E66.01 MORBID OBESITY (H): ICD-10-CM

## 2022-12-09 LAB
ALBUMIN SERPL BCG-MCNC: 4.1 G/DL (ref 3.5–5.2)
ALP SERPL-CCNC: 71 U/L (ref 35–104)
ALT SERPL W P-5'-P-CCNC: 37 U/L (ref 10–35)
ANION GAP SERPL CALCULATED.3IONS-SCNC: 8 MMOL/L (ref 7–15)
AST SERPL W P-5'-P-CCNC: 37 U/L (ref 10–35)
BASOPHILS # BLD AUTO: 0 10E3/UL (ref 0–0.2)
BASOPHILS NFR BLD AUTO: 0 %
BILIRUB SERPL-MCNC: 0.3 MG/DL
BUN SERPL-MCNC: 11.6 MG/DL (ref 6–20)
CALCIUM SERPL-MCNC: 9.6 MG/DL (ref 8.6–10)
CHLORIDE SERPL-SCNC: 102 MMOL/L (ref 98–107)
CREAT SERPL-MCNC: 0.56 MG/DL (ref 0.51–0.95)
DEPRECATED HCO3 PLAS-SCNC: 30 MMOL/L (ref 22–29)
EOSINOPHIL # BLD AUTO: 0.1 10E3/UL (ref 0–0.7)
EOSINOPHIL NFR BLD AUTO: 2 %
ERYTHROCYTE [DISTWIDTH] IN BLOOD BY AUTOMATED COUNT: 14.3 % (ref 10–15)
FACTOR V INTERPRETATION: NORMAL
GFR SERPL CREATININE-BSD FRML MDRD: >90 ML/MIN/1.73M2
GLUCOSE SERPL-MCNC: 122 MG/DL (ref 70–99)
HBA1C MFR BLD: 6.6 % (ref 4–6.2)
HCT VFR BLD AUTO: 42.2 % (ref 35–47)
HGB BLD-MCNC: 13.6 G/DL (ref 11.7–15.7)
IMM GRANULOCYTES # BLD: 0 10E3/UL
IMM GRANULOCYTES NFR BLD: 0 %
LAB DIRECTOR COMMENTS: NORMAL
LAB DIRECTOR DISCLAIMER: NORMAL
LAB DIRECTOR INTERPRETATION: NORMAL
LAB DIRECTOR METHODOLOGY: NORMAL
LAB DIRECTOR RESULTS: NORMAL
LYMPHOCYTES # BLD AUTO: 2.1 10E3/UL (ref 0.8–5.3)
LYMPHOCYTES NFR BLD AUTO: 29 %
MCH RBC QN AUTO: 25.8 PG (ref 26.5–33)
MCHC RBC AUTO-ENTMCNC: 32.2 G/DL (ref 31.5–36.5)
MCV RBC AUTO: 80 FL (ref 78–100)
MONOCYTES # BLD AUTO: 0.4 10E3/UL (ref 0–1.3)
MONOCYTES NFR BLD AUTO: 6 %
NEUTROPHILS # BLD AUTO: 4.5 10E3/UL (ref 1.6–8.3)
NEUTROPHILS NFR BLD AUTO: 63 %
NRBC # BLD AUTO: 0 10E3/UL
NRBC BLD AUTO-RTO: 0 /100
PLATELET # BLD AUTO: 292 10E3/UL (ref 150–450)
POTASSIUM SERPL-SCNC: 4.3 MMOL/L (ref 3.4–5.3)
PROT SERPL-MCNC: 7.3 G/DL (ref 6.4–8.3)
RBC # BLD AUTO: 5.27 10E6/UL (ref 3.8–5.2)
SODIUM SERPL-SCNC: 140 MMOL/L (ref 136–145)
SPECIMEN DESCRIPTION: NORMAL
TSH SERPL DL<=0.005 MIU/L-ACNC: 1.33 UIU/ML (ref 0.3–4.2)
WBC # BLD AUTO: 7.2 10E3/UL (ref 4–11)

## 2022-12-09 PROCEDURE — 85004 AUTOMATED DIFF WBC COUNT: CPT | Mod: ZL | Performed by: FAMILY MEDICINE

## 2022-12-09 PROCEDURE — 86803 HEPATITIS C AB TEST: CPT | Mod: ZL | Performed by: FAMILY MEDICINE

## 2022-12-09 PROCEDURE — G0463 HOSPITAL OUTPT CLINIC VISIT: HCPCS | Mod: 25

## 2022-12-09 PROCEDURE — G0123 SCREEN CERV/VAG THIN LAYER: HCPCS | Performed by: FAMILY MEDICINE

## 2022-12-09 PROCEDURE — 82306 VITAMIN D 25 HYDROXY: CPT | Mod: ZL | Performed by: FAMILY MEDICINE

## 2022-12-09 PROCEDURE — G0008 ADMIN INFLUENZA VIRUS VAC: HCPCS

## 2022-12-09 PROCEDURE — 80053 COMPREHEN METABOLIC PANEL: CPT | Mod: ZL | Performed by: FAMILY MEDICINE

## 2022-12-09 PROCEDURE — 36415 COLL VENOUS BLD VENIPUNCTURE: CPT | Mod: ZL | Performed by: FAMILY MEDICINE

## 2022-12-09 PROCEDURE — 81241 F5 GENE: CPT | Mod: ZL | Performed by: FAMILY MEDICINE

## 2022-12-09 PROCEDURE — 90686 IIV4 VACC NO PRSV 0.5 ML IM: CPT

## 2022-12-09 PROCEDURE — 84443 ASSAY THYROID STIM HORMONE: CPT | Mod: ZL | Performed by: FAMILY MEDICINE

## 2022-12-09 PROCEDURE — 99395 PREV VISIT EST AGE 18-39: CPT | Performed by: FAMILY MEDICINE

## 2022-12-09 PROCEDURE — 99213 OFFICE O/P EST LOW 20 MIN: CPT | Mod: 25 | Performed by: FAMILY MEDICINE

## 2022-12-09 PROCEDURE — 83036 HEMOGLOBIN GLYCOSYLATED A1C: CPT | Mod: ZL | Performed by: FAMILY MEDICINE

## 2022-12-09 PROCEDURE — 82040 ASSAY OF SERUM ALBUMIN: CPT | Mod: ZL | Performed by: FAMILY MEDICINE

## 2022-12-09 ASSESSMENT — ENCOUNTER SYMPTOMS
PALPITATIONS: 0
FREQUENCY: 0
HEADACHES: 0
HEARTBURN: 0
MYALGIAS: 0
SORE THROAT: 0
ARTHRALGIAS: 0
HEMATURIA: 0
CHILLS: 0
ABDOMINAL PAIN: 0
NAUSEA: 0
SHORTNESS OF BREATH: 0
EYE PAIN: 0
BREAST MASS: 0
DIARRHEA: 0
DIZZINESS: 0
PARESTHESIAS: 0
HEMATOCHEZIA: 0
WEAKNESS: 0
COUGH: 0
NERVOUS/ANXIOUS: 1
FEVER: 0
JOINT SWELLING: 0
CONSTIPATION: 0
DYSURIA: 0

## 2022-12-09 ASSESSMENT — ANXIETY QUESTIONNAIRES
6. BECOMING EASILY ANNOYED OR IRRITABLE: SEVERAL DAYS
GAD7 TOTAL SCORE: 6
IF YOU CHECKED OFF ANY PROBLEMS ON THIS QUESTIONNAIRE, HOW DIFFICULT HAVE THESE PROBLEMS MADE IT FOR YOU TO DO YOUR WORK, TAKE CARE OF THINGS AT HOME, OR GET ALONG WITH OTHER PEOPLE: NOT DIFFICULT AT ALL
3. WORRYING TOO MUCH ABOUT DIFFERENT THINGS: MORE THAN HALF THE DAYS
GAD7 TOTAL SCORE: 6
1. FEELING NERVOUS, ANXIOUS, OR ON EDGE: SEVERAL DAYS
7. FEELING AFRAID AS IF SOMETHING AWFUL MIGHT HAPPEN: SEVERAL DAYS
5. BEING SO RESTLESS THAT IT IS HARD TO SIT STILL: NOT AT ALL
7. FEELING AFRAID AS IF SOMETHING AWFUL MIGHT HAPPEN: SEVERAL DAYS
2. NOT BEING ABLE TO STOP OR CONTROL WORRYING: SEVERAL DAYS
GAD7 TOTAL SCORE: 6
8. IF YOU CHECKED OFF ANY PROBLEMS, HOW DIFFICULT HAVE THESE MADE IT FOR YOU TO DO YOUR WORK, TAKE CARE OF THINGS AT HOME, OR GET ALONG WITH OTHER PEOPLE?: NOT DIFFICULT AT ALL
4. TROUBLE RELAXING: NOT AT ALL

## 2022-12-09 ASSESSMENT — PATIENT HEALTH QUESTIONNAIRE - PHQ9
SUM OF ALL RESPONSES TO PHQ QUESTIONS 1-9: 7
SUM OF ALL RESPONSES TO PHQ QUESTIONS 1-9: 7
10. IF YOU CHECKED OFF ANY PROBLEMS, HOW DIFFICULT HAVE THESE PROBLEMS MADE IT FOR YOU TO DO YOUR WORK, TAKE CARE OF THINGS AT HOME, OR GET ALONG WITH OTHER PEOPLE: NOT DIFFICULT AT ALL

## 2022-12-09 ASSESSMENT — PAIN SCALES - GENERAL: PAINLEVEL: NO PAIN (0)

## 2022-12-09 NOTE — PROGRESS NOTES
SUBJECTIVE:   CC: Laurel is an 27 year old who presents for preventive health visit.   27-year-old  presents for annual preventive physical and follow-up medical problems.  Patient has been amenorrheic for over 6 months.  No history of abnormal Pap smears.    Continues to struggle with her weight.  Is very sedentary.  She does stand at work but otherwise gets minimal exercise.  She is motivated to try to lose weight so that she can be healthy for her children.    She vapes nicotine.  Uncertain quantity.    No history of gestational hypertension or diabetes.    Family history of coronary artery disease.    Patient has been advised of split billing requirements and indicates understanding: Yes  Healthy Habits:     Getting at least 3 servings of Calcium per day:  NO    Bi-annual eye exam:  Yes    Dental care twice a year:  NO    Sleep apnea or symptoms of sleep apnea:  Daytime drowsiness and Excessive snoring    Diet:  Regular (no restrictions)    Frequency of exercise:  None    Taking medications regularly:  Yes    Medication side effects:  Lightheadedness    PHQ-2 Total Score: 0    Additional concerns today:  Yes              Today's PHQ-2 Score:   PHQ-2 (  [employer]) 2022   Q1: Little interest or pleasure in doing things 0   Q2: Feeling down, depressed or hopeless 0   PHQ-2 Score 0   PHQ-2 Total Score (12-17 Years)- Positive if 3 or more points; Administer PHQ-A if positive -   Q1: Little interest or pleasure in doing things Not at all   Q2: Feeling down, depressed or hopeless Not at all   PHQ-2 Score 0       Have you ever done Advance Care Planning? (For example, a Health Directive, POLST, or a discussion with a medical provider or your loved ones about your wishes): No, advance care planning information given to patient to review.  Patient plans to discuss their wishes with loved ones or provider.      Social History     Tobacco Use     Smoking status: Former     Packs/day: 0.25     Years: 2.00      Pack years: 0.50     Types: Vaping Device, Cigarettes     Quit date: 1/19/2019     Years since quitting: 3.8     Smokeless tobacco: Never     Tobacco comments:     1-3 a day   Substance Use Topics     Alcohol use: Yes     Alcohol/week: 0.0 standard drinks     Comment: occasional -1-2 drinks a week         Alcohol Use 12/9/2022   Prescreen: >3 drinks/day or >7 drinks/week? No       Reviewed orders with patient.  Reviewed health maintenance and updated orders accordingly -       Breast Cancer Screening:  Any new diagnosis of family breast, ovarian, or bowel cancer? No    FHS-7: No flowsheet data found.    Patient under 40 years of age: Routine Mammogram Screening not recommended.   Pertinent mammograms are reviewed under the imaging tab.    History of abnormal Pap smear: NO - age 21-29 PAP every 3 years recommended     Reviewed and updated as needed this visit by clinical staff   Tobacco  Allergies  Meds      Soc Hx        Reviewed and updated as needed this visit by Provider                 Past Medical History:   Diagnosis Date     Obesity complicating pregnancy 09/2016     Polyhydramnios      Supraventricular tachycardia (H) 2013        Review of Systems   Constitutional: Negative for chills and fever.   HENT: Negative for congestion, ear pain, hearing loss and sore throat.    Eyes: Negative for pain and visual disturbance.   Respiratory: Negative for cough and shortness of breath.    Cardiovascular: Negative for chest pain, palpitations and peripheral edema.   Gastrointestinal: Negative for abdominal pain, constipation, diarrhea, heartburn, hematochezia and nausea.   Breasts:  Negative for tenderness, breast mass and discharge.   Genitourinary: Negative for dysuria, frequency, genital sores, hematuria, pelvic pain, urgency, vaginal bleeding and vaginal discharge.   Musculoskeletal: Negative for arthralgias, joint swelling and myalgias.   Skin: Negative for rash.   Neurological: Negative for dizziness, weakness,  "headaches and paresthesias.   Psychiatric/Behavioral: Negative for mood changes. The patient is nervous/anxious.      CONSTITUTIONAL: NEGATIVE for fever, chills, change in weight  INTEGUMENTARU/SKIN: NEGATIVE for worrisome rashes, moles or lesions  EYES: NEGATIVE for vision changes or irritation  ENT: NEGATIVE for ear, mouth and throat problems  RESP: NEGATIVE for significant cough or SOB  BREAST: NEGATIVE for masses, tenderness or discharge  CV: NEGATIVE for chest pain, palpitations or peripheral edema  GI: NEGATIVE for nausea, abdominal pain, heartburn, or change in bowel habits  : NEGATIVE for unusual urinary or vaginal symptoms. Periods are regular.  MUSCULOSKELETAL: NEGATIVE for significant arthralgias or myalgia  NEURO: NEGATIVE for weakness, dizziness or paresthesias  PSYCHIATRIC: NEGATIVE for changes in mood or affect     OBJECTIVE:   BP (!) 154/98   Pulse 91   Temp 99  F (37.2  C) (Tympanic)   Resp 20   Ht 1.575 m (5' 2\")   Wt (!) 165.6 kg (365 lb)   LMP 11/27/2021 (Exact Date)   SpO2 97%   Breastfeeding No   BMI 66.76 kg/m    Physical Exam  GENERAL: healthy, alert and no distress  EYES: Eyes grossly normal to inspection, PERRL and conjunctivae and sclerae normal  HENT: ear canals and TM's normal, nose and mouth without ulcers or lesions  NECK: no adenopathy, no asymmetry, masses, or scars and thyroid normal to palpation  RESP: lungs clear to auscultation - no rales, rhonchi or wheezes  BREAST: normal without masses, tenderness or nipple discharge and no palpable axillary masses or adenopathy  CV: regular rate and rhythm, normal S1 S2, no S3 or S4, no murmur, click or rub, no peripheral edema and peripheral pulses strong  MS: no gross musculoskeletal defects noted, no edema  SKIN: no suspicious lesions or rashes  NEURO: Normal strength and tone, mentation intact and speech normal  PSYCH: mentation appears normal, affect normal/bright    Diagnostic Test Results:  Labs reviewed in Epic  Results for " orders placed or performed in visit on 12/09/22   Comprehensive Metabolic Panel     Status: Abnormal   Result Value Ref Range    Sodium 140 136 - 145 mmol/L    Potassium 4.3 3.4 - 5.3 mmol/L    Chloride 102 98 - 107 mmol/L    Carbon Dioxide (CO2) 30 (H) 22 - 29 mmol/L    Anion Gap 8 7 - 15 mmol/L    Urea Nitrogen 11.6 6.0 - 20.0 mg/dL    Creatinine 0.56 0.51 - 0.95 mg/dL    Calcium 9.6 8.6 - 10.0 mg/dL    Glucose 122 (H) 70 - 99 mg/dL    Alkaline Phosphatase 71 35 - 104 U/L    AST 37 (H) 10 - 35 U/L    ALT 37 (H) 10 - 35 U/L    Protein Total 7.3 6.4 - 8.3 g/dL    Albumin 4.1 3.5 - 5.2 g/dL    Bilirubin Total 0.3 <=1.2 mg/dL    GFR Estimate >90 >60 mL/min/1.73m2   Hemoglobin A1c     Status: Abnormal   Result Value Ref Range    Hemoglobin A1C 6.6 (H) 4.0 - 6.2 %   TSH Reflex GH     Status: Normal   Result Value Ref Range    TSH 1.33 0.30 - 4.20 uIU/mL   Vitamin D Total     Status: Abnormal   Result Value Ref Range    Vitamin D, Total (25-Hydroxy) 16 (L) 20 - 75 ug/L    Narrative    Season, race, dietary intake, and treatment affect the concentration of 25-hydroxy-Vitamin D. Values may decrease during winter months and increase during summer months. Values 20-29 ug/L may indicate Vitamin D insufficiency and values <20 ug/L may indicate Vitamin D deficiency.    Vitamin D determination is routinely performed by an immunoassay specific for 25 hydroxyvitamin D3.  If an individual is on vitamin D2(ergocalciferol) supplementation, please specify 25 OH vitamin D2 and D3 level determination by LCMSMS test VITD23.     Hepatitis C Screen Reflex to HCV RNA Quant and Genotype     Status: Normal   Result Value Ref Range    Hepatitis C Antibody Nonreactive Nonreactive    Narrative    Assay performance characteristics have not been established for newborns, infants, and children.   CBC with platelets and differential     Status: Abnormal   Result Value Ref Range    WBC Count 7.2 4.0 - 11.0 10e3/uL    RBC Count 5.27 (H) 3.80 - 5.20  10e6/uL    Hemoglobin 13.6 11.7 - 15.7 g/dL    Hematocrit 42.2 35.0 - 47.0 %    MCV 80 78 - 100 fL    MCH 25.8 (L) 26.5 - 33.0 pg    MCHC 32.2 31.5 - 36.5 g/dL    RDW 14.3 10.0 - 15.0 %    Platelet Count 292 150 - 450 10e3/uL    % Neutrophils 63 %    % Lymphocytes 29 %    % Monocytes 6 %    % Eosinophils 2 %    % Basophils 0 %    % Immature Granulocytes 0 %    NRBCs per 100 WBC 0 <1 /100    Absolute Neutrophils 4.5 1.6 - 8.3 10e3/uL    Absolute Lymphocytes 2.1 0.8 - 5.3 10e3/uL    Absolute Monocytes 0.4 0.0 - 1.3 10e3/uL    Absolute Eosinophils 0.1 0.0 - 0.7 10e3/uL    Absolute Basophils 0.0 0.0 - 0.2 10e3/uL    Absolute Immature Granulocytes 0.0 <=0.4 10e3/uL    Absolute NRBCs 0.0 10e3/uL   Factor 5 leiden mutation analysis     Status: None   Result Value Ref Range    METHODOLOGY       The regions of genomic DNA containing the F5 gene mutation R506Q(1691G>A) and the Factor 2 (Prothrombin [id number]) gene mutation were simultaneously amplified using the polymerase chain reaction. The amplified products were digested with restriction endonuclease TaqI and products were analyzed by gel electrophoresis.       RESULTS         Factor V 1691G>A (Leiden)  RESULTS:  Mutation analyzed: 1691G>A  Factor V 1691G>A (Leiden)  Interpretation:  ABSENT  Factor V 1691G>A (Leiden) mutation  genotype:  G/G        INTERPRETATION       The patient is negative for the F5 gene mutation R506Q (1691G>A) mutation.  (Electronically signed by: Iggy Jaramillo MD December 13, 2022 3:19 PM)      COMMENTS       If a patient is the recipient of an allogeneic bone marrow transplant, this test must be done on a pre-transplant sample or buccal swab.  A previous allogeneic bone marrow transplant will interfere with test results.  Call the [facility] ([phone]) for instructions on sample collection for these patients.      DISCLAIMER       This test was developed and its performance characteristics determined by [facility]. It has not been cleared or approved by the FDA. The laboratory is regulated under CLIA as qualified to perform high-complexity testing. This test is used for clinical purposes. It should not be regarded as investigational or for research.    A resident/fellow in an accredited training program was involved in the selection of testing, review of laboratory data, and/or interpretation of this case.  I, as the senior physician, attest that I: (i) confirmed appropriate testing, (ii) examined the relevant raw data for the specimen(s); and (iii) rendered or confirmed the interpretation(s).      FACTOR V INTERPRETATION       Factor V 1691G>A (Leiden)  Interpretation:  ABSENT      Specimen Description       Blood: ACD     CBC and Differential     Status: Abnormal    Narrative    The following orders were created for panel order CBC and Differential.  Procedure                               Abnormality         Status                     ---------                               -----------         ------                     CBC with platelets and d...[[id number]]  Abnormal            Final result                 Please view results for these tests on the individual orders.     1. Annual physical exam  Patient is counseled on importance of regular self breast exams and mammograms every 1-2 years starting at age 40. Patient will proceed with pap smears every 3-5 years until age 65. Discussed importance of calcium and vitamin D supplementation and osteoporosis screening. Immunizations are updated based on CDC recommendations and patients desire. Reviewed importance of sunscreen, limit sun exposure and monitoring for changing moles with ABCDEs. Recommend seatbelt use and helmets with biking, skiing and ATV/Snowmobile use.    Pap smear obtained today.  Hep C screen pending  Flu shot provided  2. GINO (generalized anxiety disorder)  Continue on Zoloft  - sertraline (ZOLOFT) 50 MG tablet; Take 1 tablet (50 mg) by  mouth daily  Dispense: 90 tablet; Refill: 3    3. Family history of blood clots    - CBC and Differential; Future  - Factor 5 leiden mutation analysis; Future  - CBC and Differential  - Factor 5 leiden mutation analysis    4. Cervical cancer screening    - Pap Screen reflex to HPV if ASCUS - recommended age 25 - 29 years    5. Morbid obesity (H)  Prolonged discussion about health implications of morbid obesity.  She has a family history of coronary artery disease.  We discussed medical weight loss clinic, bariatric surgery referral, medication options.  A1c is now 6.6.  We will start metformin 500 mg twice daily.  Discussed physical exercise of 10 to 15 minutes/day.  Continue caloric monitoring with my fitness pal mike.  She will see me again in 2 months.  - Comprehensive Metabolic Panel; Future  - Hemoglobin A1c; Future  - metFORMIN (GLUCOPHAGE) 500 MG tablet; Take 1 tablet (500 mg) by mouth 2 times daily (with meals)  Dispense: 90 tablet; Refill: 1  - TSH Reflex GH; Future  - Vitamin D Total; Future  - Comprehensive Metabolic Panel  - Hemoglobin A1c  - TSH Reflex GH  - Vitamin D Total    6. Need for hepatitis C screening test    - Hepatitis C Screen Reflex to HCV RNA Quant and Genotype; Future  - Hepatitis C Screen Reflex to HCV RNA Quant and Genotype    7. Type 2 diabetes mellitus without complication, without long-term current use of insulin (H)  New onset.  Referral to diabetic educator.        Patient Instructions   Great job with recent changes!   Calorie intake 1900/day, high protein, low carb, 3-5 small meals per day, avoid consuming wasted calories in sugary beverages  Start metformin 500 mg 2 x daily with food, will give you a jump start, there are other medication options as well  See me again in 2 months  Labs today  Pap today  Flu shot today          COUNSELING:  Reviewed preventive health counseling, as reflected in patient instructions  Special attention given to:        Regular exercise       Healthy  "diet/nutrition       Vision screening       Contraception       Consider Hep C screening for all patients one time for ages 18-79 years       Advance Care Planning      BMI:   Estimated body mass index is 66.76 kg/m  as calculated from the following:    Height as of this encounter: 1.575 m (5' 2\").    Weight as of this encounter: 165.6 kg (365 lb).   Weight management plan: Discussed healthy diet and exercise guidelines      She reports that she quit smoking about 3 years ago. Her smoking use included vaping device and cigarettes. She has a 0.50 pack-year smoking history. She has never used smokeless tobacco.      Sho Joya MD  [facility] AND [facility]  Answers for HPI/ROS submitted by the patient on 12/9/2022  If you checked off any problems, how difficult have these problems made it for you to do your work, take care of things at home, or get along with other people?: Not difficult at all  PHQ9 TOTAL SCORE: 7  GINO 7 TOTAL SCORE: 6      "

## 2022-12-09 NOTE — PATIENT INSTRUCTIONS
Great job with recent changes!   Calorie intake 1900/day, high protein, low carb, 3-5 small meals per day, avoid consuming wasted calories in sugary beverages  Start metformin 500 mg 2 x daily with food, will give you a jump start, there are other medication options as well  See me again in 2 months  Labs today  Pap today  Flu shot today

## 2022-12-09 NOTE — NURSING NOTE
Patient is here for physical.  Has been having episodes of lightheadedness and dizzy at times.       Patient's last menstrual period was 11/27/2021 (exact date).  Medication Reconciliation: complete      Denisse Sweet LPN 12/9/2022 9:59 AM       Advance care directive on file? no  Advance care directive provided to patient? no       Denisse Sweet LPN

## 2022-12-10 LAB
DEPRECATED CALCIDIOL+CALCIFEROL SERPL-MC: 16 UG/L (ref 20–75)
HCV AB SERPL QL IA: NONREACTIVE

## 2022-12-15 PROBLEM — E11.9 DIABETES MELLITUS, TYPE 2 (H): Status: ACTIVE | Noted: 2022-12-15

## 2022-12-16 LAB
BKR LAB AP GYN ADEQUACY: NORMAL
BKR LAB AP GYN INTERPRETATION: NORMAL
BKR LAB AP HPV REFLEX: NORMAL
BKR LAB AP LMP: NORMAL
BKR LAB AP PREVIOUS ABNORMAL: NORMAL
PATH REPORT.COMMENTS IMP SPEC: NORMAL
PATH REPORT.COMMENTS IMP SPEC: NORMAL
PATH REPORT.RELEVANT HX SPEC: NORMAL

## 2022-12-19 ENCOUNTER — NURSE TRIAGE (OUTPATIENT)
Dept: FAMILY MEDICINE | Facility: OTHER | Age: 27
End: 2022-12-19

## 2022-12-19 NOTE — TELEPHONE ENCOUNTER
"S-(situation): rectal bleeding    B-(background): Rectal bleeding that began today. States she has recently changed diet    A-(assessment): Patient states \"blood streaks\" at times on toilet paper and she has noticed one small clot in toilet water following a bowel movement that she described looking like a \"period\". States she told her finance that her \"butthole stings\". Denies any history hemorrhoids or anal fissures. Denies fever, changes in bowel patterns, or any other symptoms at this time.    R-(recommendations): Per protocol, see in office within two weeks- patient was transferred to setup appointment for 12/28/22. Did verbalize understanding in home care, use or [facility]/ ER if needed, and symptoms to monitor for. Will call triage with any new or worsening in symptoms- verbalized understanding.    Corinne R Thayer, RN on 12/19/2022 at 4:43 PM    Reason for Disposition    Rectal bleeding is minimal (e.g., blood just on toilet paper, a few drops in toilet bowl)    Normal formed BM with a few streaks or drops of blood on surface of BM    Additional Information    Negative: Passed out (i.e., fainted, collapsed and was not responding)    Negative: Shock suspected (e.g., cold/pale/clammy skin, too weak to stand, low BP, rapid pulse)    Negative: Vomiting red blood or black (coffee ground) material    Negative: Sounds like a life-threatening emergency to the triager    Negative: Diarrhea is main symptom    Negative: Rectal symptoms    Negative: SEVERE rectal bleeding (large blood clots; constant or on and off bleeding)    Negative: SEVERE dizziness (e.g., unable to stand, requires support to walk, feels like passing out now)    Negative: MODERATE rectal bleeding (small blood clots, passing blood without stool, or toilet water turns red) more than once a day    Negative: Bloody, black, or tarry bowel movements  (Exception: Chronic-unchanged black-grey bowel movements and is taking iron pills or Pepto-Bismol.)    " "Negative: High-risk adult (e.g., prior surgery on aorta, abdominal aortic aneurysm)    Negative: Rectal foreign body (inserted or swallowed)    Negative: SEVERE abdominal pain (e.g., excruciating)    Negative: Constant abdominal pain lasting > 2 hours    Negative: Pale skin (pallor) of new-onset or worsening    Negative: Patient sounds very sick or weak to the triager    Negative: MODERATE rectal bleeding (small blood clots, passing blood without stool, or toilet water turns red)    Negative: Taking Coumadin (warfarin) or other strong blood thinner, or known bleeding disorder (e.g., thrombocytopenia)    Negative: Colonoscopy in past 72 hours    Negative: Known cirrhosis of the liver (or history of liver failure or ascites)    Negative: Patient wants to be seen    Negative: MILD rectal bleeding (more than just a few drops or streaks)    Negative: Cancer of rectum or intestines (colon)    Negative: Radiation therapy to lower abdomen or pelvis    Answer Assessment - Initial Assessment Questions  1. APPEARANCE of BLOOD: \"What color is it?\" \"Is it passed separately, on the surface of the stool, or mixed in with the stool?\"       Bright red- surface of stool, on toilet paper, once occasional in toilet water  2. AMOUNT: \"How much blood was passed?\"       Small amounts, streaks on toilet paper, one small clot  3. FREQUENCY: \"How many times has blood been passed with the stools?\"       3-4 times  4. ONSET: \"When was the blood first seen in the stools?\" (Days or weeks)       Started today  5. DIARRHEA: \"Is there also some diarrhea?\" If Yes, ask: \"How many diarrhea stools in the past 24 hours?\"       no  6. CONSTIPATION: \"Do you have constipation?\" If Yes, ask: \"How bad is it?\"      no  7. RECURRENT SYMPTOMS: \"Have you had blood in your stools before?\" If Yes, ask: \"When was the last time?\" and \"What happened that time?\"       no  8. BLOOD THINNERS: \"Do you take any blood thinners?\" (e.g., Coumadin/warfarin, " "Pradaxa/dabigatran, aspirin)      nno  9. OTHER SYMPTOMS: \"Do you have any other symptoms?\"  (e.g., abdomen pain, vomiting, dizziness, fever)      no  10. PREGNANCY: \"Is there any chance you are pregnant?\" \"When was your last menstrual period?\"        no    Protocols used: RECTAL BLEEDING-A-OH    "

## 2022-12-19 NOTE — TELEPHONE ENCOUNTER
Patient states she is having rectal bleeding. She states it looks like she has her period in the toilet. Please call.    Earnestine Hall on 12/19/2022 at 4:28 PM

## 2023-01-30 ENCOUNTER — ALLIED HEALTH/NURSE VISIT (OUTPATIENT)
Dept: EDUCATION SERVICES | Facility: OTHER | Age: 28
End: 2023-01-30
Attending: FAMILY MEDICINE
Payer: COMMERCIAL

## 2023-01-30 DIAGNOSIS — E11.9 TYPE 2 DIABETES MELLITUS WITHOUT COMPLICATION, WITHOUT LONG-TERM CURRENT USE OF INSULIN (H): Primary | ICD-10-CM

## 2023-01-30 PROCEDURE — G0108 DIAB MANAGE TRN  PER INDIV: HCPCS | Performed by: REGISTERED NURSE

## 2023-01-30 NOTE — PATIENT INSTRUCTIONS
Diabetes Goals and Reminders    Your A1c test should be done every 3 months.  Your goal is less than 7%.   Your last result is:  Lab Results   Component Value Date    A1C 6.6 12/09/2022       Your LDL cholesterol test should be done at least once a year.  Take a statin, if prescribed by your doctor, based on age and risk factors.  Your last result is:  No results found for: LDL    Have blood pressure and weight checked every three months.  Your blood pressure goal is 130/80 or less.  Your last blood pressure reading was:   BP Readings from Last 1 Encounters:   12/09/22 (!) 154/98       Test your blood sugar 1 times per day.  Your home blood glucose target ranges are:  Fasting or Before meals:   2 hours after a meal: Less than 180      Avoid all tobacco.  Follow your healthy diet and exercise plan.  See the eye doctor every year.  See the dentist every six months.  Have kidney function tested yearly.    Take all medicine as prescribed.  Please let me know if you are having symptoms you don t expect or if you wish to stop any medicine.    Follow Up Plan  Please call or visit Diabetes Education if blood sugars are consistently out of target.  Your future lab plan is:  HgA1c in March 2023.    If you need your cholesterol checked at your next appointment, you should fast 8 to 10 hours before your appointment.  Do not eat or drink anything besides water.  Drink plenty of water and take all your usual medicine.    SUMMARY FOR TODAY'S VISIT    1.  Begin testing blood sugar 1 time(s) daily, as directed.    2.  Discussed the low carb plan to help decrease weight, improve blood pressure, improve A1c, decrease triglycerides and increase good HDL cholesterol.  These benefits were summarized from the ADA Consensus report in 2019.      Recommend beginning to count carbohydrates following the low carb plan:  Up to ~ 15 grams with breakfast, 30 grams with lunch and 30 grams with supper.       A key strategy in this plan is,  along with medication need, to participate in low to moderate physical activity, such as walking, working up to a minimum of 30 minutes most days, as tolerated.        3.  Please follow-up for continued diabetes education, as needed.     Vivian Cruz RN, BSN, [facility]  1/30/2023 5:43 PM

## 2023-01-30 NOTE — PROGRESS NOTES
Diabetes Self-Management Education & Support    Presents for: Initial Assessment for new diagnosis    Type of Service: In Person Visit    Assessment Type:   ASSESSMENT:  Patient newly diagnosed DM2.  Discussed pathophysiology with interpretation and meaning of HgA1c.  Stressed importance of testing BG daily to help keep tight control of DM2, helping to minimize risk for possible complications of uncontrolled diabetes.  Discussed benefits of keeping A1c under 7% and encouraged patient to begin testing BG daily.    Discussed the low carb plan to help decrease weight, improve blood pressure, improve A1c, decrease triglycerides and increase good HDL cholesterol.  These benefits were summarized from the ADA Consensus report in 2019.      Begin carbohydrate counting, low carb plan:  Up to ~ 15 grams with breakfast, 30 grams with lunch and 30 grams with supper.       A key strategy in this plan is, along with medication need, to participate in low to moderate physical activity, such as walking, working up to a minimum of 30 minutes most days, as tolerated.      BG meter training discussed, patient shares she understands how to SMBG and will begin once daily, fasting.       Patient's most recent   Lab Results   Component Value Date    A1C 6.6 12/09/2022     is meeting goal of <7.0    Diabetes knowledge and skills assessment:   Patient is knowledgeable in diabetes management concepts related to: Monitoring and Taking Medication    Continue education with the following diabetes management concepts: Healthy Eating, Being Active, Problem Solving, Reducing Risks and Healthy Coping    Based on learning assessment above, most appropriate setting for further diabetes education would be: Group class or Individual setting.      PLAN    Topics to cover at upcoming visits: Healthy Eating, Being Active, Problem Solving, Reducing Risks and Healthy Coping    Follow-up: TBD per patient schedule.     See Care Plan for co-developed,  "patient-state behavior change goals.  AVS provided for patient today.    Education Materials Provided:    Type 2 Diabetes The First Step booklet, My Food Plan, Activity Pyramid, A1c flyer, Tips on SMBG, Diabetes Success Plan, DSMS sheet and New T2DM  folder.      SUBJECTIVE/OBJECTIVE:  Presents for: Initial Assessment for new diagnosis  Accompanied by: Self  Diabetes education in the past 24mo: No  Focus of Visit: Healthy Eating, Being Active, Diabetes Pathophysiology, Monitoring, Taking Medication  Diabetes type: Type 2  Date of diagnosis: 12/9/2023  Disease course: Other  How confident are you filling out medical forms by yourself:: Quite a bit  Cultural Influences/Ethnic Background:  Not  or       Diabetes Symptoms & Complications:  Fatigue: Yes  Neuropathy: No  Polydipsia: No  Polyphagia: Sometimes  Polyuria: No  Visual change: No  Slow healing wounds: No  Autonomic neuropathy: No  CVA: No  Heart disease: No  Nephropathy: No  Peripheral neuropathy: No  Peripheral Vascular Disease: No  Retinopathy: No  Sexual dysfunction: No    Patient Problem List and Family Medical History reviewed for relevant medical history, current medical status, and diabetes risk factors.    Vitals:  There were no vitals taken for this visit.  Estimated body mass index is 66.76 kg/m  as calculated from the following:    Height as of 12/9/22: 1.575 m (5' 2\").    Weight as of 12/9/22: 165.6 kg (365 lb).   Last 3 BP:   BP Readings from Last 3 Encounters:   12/09/22 (!) 154/98   07/02/22 136/82   09/11/20 134/86       History   Smoking Status     Former     Packs/day: 0.25     Years: 2.00     Types: Vaping Device, Cigarettes     Quit date: 1/19/2019   Smokeless Tobacco     Never       Labs:  Lab Results   Component Value Date    A1C 6.6 12/09/2022     Lab Results   Component Value Date     12/09/2022     04/02/2020     No results found for: LDL  No results found for: HDL]  GFR Estimate   Date Value Ref Range Status "   12/09/2022 >90 >60 mL/min/1.73m2 Final     Comment:     Effective December 21, 2021 eGFRcr in adults is calculated using the 2021 CKD-EPI creatinine equation which includes age and gender (Sunday marin al., NEM, DOI: 10.1056/OLJAxi5400343)   04/02/2020 >90 >60 mL/min/[1.73_m2] Final     GFR Estimate If Black   Date Value Ref Range Status   04/02/2020 >90 >60 mL/min/[1.73_m2] Final     Lab Results   Component Value Date    CR 0.56 12/09/2022    CR 0.62 04/02/2020     No results found for: MICROALBUMIN    Healthy Eating:  Healthy Eating Assessed Today: Yes  Cultural/Christian diet restrictions?: No  Meal planning/habits: Avoiding sweets, Calorie counting, Keeps food records  How many times a week on average do you eat food made away from home (restaurant/take-out)?: 3  Meals include: Breakfast, Lunch, Dinner  Beverages: Water, Diet soda    Being Active:  Barrier to exercise: Other    Monitoring:  Blood Glucose Meter: Other, Unknown  Times checking blood sugar at home (number): Never  Blood glucose trend: Other        Taking Medications:  Diabetes Medication(s)     Biguanides       metFORMIN (GLUCOPHAGE) 500 MG tablet    Take 1 tablet (500 mg) by mouth 2 times daily (with meals)          Current Treatments: Oral Medication (taken by mouth)    Reducing Risks:  CAD Risks: Diabetes Mellitus, Family history, Obesity, Sedentary lifestyle, Tobacco exposure  Has dilated eye exam at least once a year?: No  Sees dentist every 6 months?: No  Feet checked by healthcare provider in the last year?: No    Healthy Coping:  Emotional response to diabetes: Ready to learn  Informal Support system:: Children, Family, Friends, Significant other  Stage of change: PREPARATION (Decided to change - considering how)  Support resources: Offerings in Clinic Communities  Patient Activation Measure Survey Score:  No flowsheet data found.      Care Plan and Education Provided:  Care Plan: Diabetes   Updates made by Vivian Cruz RN since  1/30/2023 12:00 AM      Problem: HbA1C Not In Goal       Goal: Establish Regular Follow-Ups with PCP       Task: Discuss with PCP the recommended timing for patient's next follow up visit(s)    Responsible User: Vivian Cruz RN      Task: Discuss schedule for PCP visits with patient Completed 1/30/2023   Responsible User: Vivian Cruz RN      Goal: Get HbA1C Level in Goal       Task: Educate patient on diabetes education self-management topics Completed 1/30/2023   Responsible User: Vivian Cruz RN      Task: Educate patient on benefits of regular glucose monitoring Completed 1/30/2023   Responsible User: Vivian Cruz RN      Task: Refer patient to appropriate extended care team member, as needed (Medication Therapy Management, Behavioral Health, Physical Therapy, etc.)    Responsible User: Vivian Cruz RN      Task: Discuss diabetes treatment plan with patient Completed 1/30/2023   Responsible User: Vivian Cruz RN      Problem: Diabetes Self-Management Education Needed to Optimize Self-Care Behaviors       Goal: Understand diabetes pathophysiology and disease progression       Task: Provide education on diabetes pathophysiology and disease progression specfic to patient's diabetes type Completed 1/30/2023   Responsible User: Vivian Cruz RN      Goal: Healthy Eating - follow a healthy eating pattern for diabetes       Task: Provide education on portion control and consistency in amount, composition and timing of food intake Completed 1/30/2023   Responsible User: Vivian Cruz RN      Task: Provide education on managing carbohydrate intake (carbohydrate counting, plate planning method, etc.) Completed 1/30/2023   Responsible User: Vivian Cruz RN      Task: Provide education on weight management    Responsible User: Vivian Cruz RN      Task: Provide education on heart healthy eating    Responsible User: Vivian Cruz RN      Task: Provide education on eating out     Responsible User: Vivian Cruz RN      Task: Develop individualized healthy eating plan with patient    Responsible User: Vivian Cruz RN      Goal: Being Active - get regular physical activity, working up to at least 150 minutes per week       Task: Provide education on relationship of activity to glucose and precautions to take if at risk for low glucose Completed 1/30/2023   Responsible User: Vivian Cruz RN      Task: Discuss barriers to physical activity with patient    Responsible User: Vivian Cruz RN      Task: Develop physical activity plan with patient    Responsible User: Vivian Cruz RN      Task: Explore community resources including walking groups, assistance programs, and home videos    Responsible User: Vivian Cruz RN      Goal: Monitoring - monitor glucose and ketones as directed       Task: Provide education on blood glucose monitoring (purpose, proper technique, frequency, glucose targets, interpreting results, when to use glucose control solution, sharps disposal) Completed 1/30/2023   Responsible User: Vivian Cruz RN      Task: Provide education on continuous glucose monitoring (sensor placement, use of mike or /reader, understanding glucose trends, alerts and alarms, differences between sensor glucose and blood glucose)    Responsible User: Vivian Cruz RN      Task: Provide education on ketone monitoring (when to monitor, frequency, etc.)    Responsible User: Vivian Cruz RN      Goal: Taking Medication - patient is consistently taking medications as directed       Task: Provide education on action of prescribed medication, including when to take and possible side effects Completed 1/30/2023   Responsible User: Vivian Cruz RN      Task: Provide education on insulin and injectable diabetes medications, including administration, storage, site selection and rotation for injection sites    Responsible User: Vivian Cruz RN      Task: Discuss  barriers to medication adherence with patient and provide management technique ideas as appropriate    Responsible User: Vivian Cruz RN      Task: Provide education on frequency and refill details of medications    Responsible User: Vivian Cruz RN      Goal: Problem Solving - know how to prevent and manage short-term diabetes complications       Task: Provide education on high blood glucose - causes, signs/symptoms, prevention and treatment Completed 1/30/2023   Responsible User: Vivian Cruz RN      Task: Provide education on low blood glucose - causes, signs/symptoms, prevention, treatment, carrying a carbohydrate source at all times, and medical identification    Responsible User: Vivian Cruz RN      Task: Provide education on safe travel with diabetes    Responsible User: Vivian Cruz RN      Task: Provide education on how to care for diabetes on sick days    Responsible User: Vivian Cruz RN      Task: Provide education on when to call a health care provider    Responsible User: Vivian Cruz RN      Goal: Reducing Risks - know how to prevent and treat long-term diabetes complications       Task: Provide education on major complications of diabetes, prevention, early diagnostic measures and treatment of complications Completed 1/30/2023   Responsible User: Vivian Cruz RN      Task: Provide education on recommended care for dental, eye and foot health    Responsible User: Vivian Cruz RN      Task: Provide education on Hemoglobin A1c - goals and relationship to blood glucose levels Completed 1/30/2023   Responsible User: Vivian Cruz RN      Task: Provide education on recommendations for heart health - lipid levels and goals, blood pressure and goals, and aspirin therapy, if indicated    Responsible User: Vivian Cruz RN      Task: Provide education on tobacco cessation    Responsible User: Vivian Cruz RN      Goal: Healthy Coping - use available resources to  cope with the challenges of managing diabetes       Task: Discuss recognizing feelings about having diabetes    Responsible User: Vivian Cruz RN      Task: Provide education on the benefits of making appropriate lifestyle changes Completed 1/30/2023   Responsible User: Vivian Cruz RN      Task: Provide education on benefits of utilizing support systems    Responsible User: Vivian Cruz RN      Task: Discuss methods for coping with stress    Responsible User: Vivian Cruz RN      Task: Provide education on when to seek professional counseling    Responsible User: Vivian Cruz RN Suzette Childs, RN, BSN, [facility]  1/30/2023 12:42 PM     Time Spent: 60 minutes  Encounter Type: Individual    Any diabetes medication dose changes were made via the CDE Protocol per the patient's primary care provider. A copy of this encounter was shared with the provider.

## 2023-02-03 ASSESSMENT — ANXIETY QUESTIONNAIRES
IF YOU CHECKED OFF ANY PROBLEMS ON THIS QUESTIONNAIRE, HOW DIFFICULT HAVE THESE PROBLEMS MADE IT FOR YOU TO DO YOUR WORK, TAKE CARE OF THINGS AT HOME, OR GET ALONG WITH OTHER PEOPLE: SOMEWHAT DIFFICULT
8. IF YOU CHECKED OFF ANY PROBLEMS, HOW DIFFICULT HAVE THESE MADE IT FOR YOU TO DO YOUR WORK, TAKE CARE OF THINGS AT HOME, OR GET ALONG WITH OTHER PEOPLE?: SOMEWHAT DIFFICULT
4. TROUBLE RELAXING: SEVERAL DAYS
2. NOT BEING ABLE TO STOP OR CONTROL WORRYING: SEVERAL DAYS
6. BECOMING EASILY ANNOYED OR IRRITABLE: SEVERAL DAYS
3. WORRYING TOO MUCH ABOUT DIFFERENT THINGS: MORE THAN HALF THE DAYS
GAD7 TOTAL SCORE: 9
7. FEELING AFRAID AS IF SOMETHING AWFUL MIGHT HAPPEN: MORE THAN HALF THE DAYS
5. BEING SO RESTLESS THAT IT IS HARD TO SIT STILL: SEVERAL DAYS
GAD7 TOTAL SCORE: 9
GAD7 TOTAL SCORE: 9
1. FEELING NERVOUS, ANXIOUS, OR ON EDGE: SEVERAL DAYS
7. FEELING AFRAID AS IF SOMETHING AWFUL MIGHT HAPPEN: MORE THAN HALF THE DAYS

## 2023-02-03 ASSESSMENT — PATIENT HEALTH QUESTIONNAIRE - PHQ9
10. IF YOU CHECKED OFF ANY PROBLEMS, HOW DIFFICULT HAVE THESE PROBLEMS MADE IT FOR YOU TO DO YOUR WORK, TAKE CARE OF THINGS AT HOME, OR GET ALONG WITH OTHER PEOPLE: NOT DIFFICULT AT ALL
SUM OF ALL RESPONSES TO PHQ QUESTIONS 1-9: 3
SUM OF ALL RESPONSES TO PHQ QUESTIONS 1-9: 3

## 2023-02-09 ENCOUNTER — OFFICE VISIT (OUTPATIENT)
Dept: FAMILY MEDICINE | Facility: OTHER | Age: 28
End: 2023-02-09
Attending: PEDIATRICS
Payer: COMMERCIAL

## 2023-02-09 VITALS
TEMPERATURE: 98.5 F | SYSTOLIC BLOOD PRESSURE: 142 MMHG | WEIGHT: 293 LBS | OXYGEN SATURATION: 97 % | DIASTOLIC BLOOD PRESSURE: 96 MMHG | HEART RATE: 90 BPM | RESPIRATION RATE: 18 BRPM | BODY MASS INDEX: 64.49 KG/M2

## 2023-02-09 DIAGNOSIS — E11.9 TYPE 2 DIABETES MELLITUS WITHOUT COMPLICATION, WITHOUT LONG-TERM CURRENT USE OF INSULIN (H): Primary | ICD-10-CM

## 2023-02-09 DIAGNOSIS — E66.01 MORBID OBESITY (H): ICD-10-CM

## 2023-02-09 PROBLEM — O36.8390 MATERNAL CARE FOR FETAL TACHYCARDIA DURING PREGNANCY: Status: RESOLVED | Noted: 2019-03-27 | Resolved: 2023-02-09

## 2023-02-09 PROCEDURE — G0463 HOSPITAL OUTPT CLINIC VISIT: HCPCS

## 2023-02-09 PROCEDURE — 99213 OFFICE O/P EST LOW 20 MIN: CPT | Performed by: FAMILY MEDICINE

## 2023-02-09 ASSESSMENT — PAIN SCALES - GENERAL: PAINLEVEL: NO PAIN (0)

## 2023-02-09 ASSESSMENT — PATIENT HEALTH QUESTIONNAIRE - PHQ9
SUM OF ALL RESPONSES TO PHQ QUESTIONS 1-9: 3
10. IF YOU CHECKED OFF ANY PROBLEMS, HOW DIFFICULT HAVE THESE PROBLEMS MADE IT FOR YOU TO DO YOUR WORK, TAKE CARE OF THINGS AT HOME, OR GET ALONG WITH OTHER PEOPLE: NOT DIFFICULT AT ALL

## 2023-02-09 ASSESSMENT — ANXIETY QUESTIONNAIRES: GAD7 TOTAL SCORE: 9

## 2023-02-09 NOTE — PROGRESS NOTES
Assessment & Plan     Type 2 diabetes mellitus without complication, without long-term current use of insulin (H)  Excellent candidate for Ozempic, will send prescription and wait for approval.  If approved, she will discontinue metformin which is causing loose stools and start with Ozempic.  Follow-up in 2 months with repeat A1c.  - FOOT EXAM  - semaglutide (OZEMPIC) 2 MG/1.5ML SOPN pen; Inject 0.25 mg Subcutaneous every 7 days    Morbid obesity (H)  Congratulated her on 13 pound weight loss.  Encouragement provided.  Increase movement to 15 minutes daily.      No follow-ups on file.    Sho Joya MD  [facility]   Laurel is a 28 year old, presenting for the following health issues:  Recheck Medication    28-year-old female presents for recheck on type 2 diabetes mellitus.  She was seen in early December, A1c was 6.6.  Started on metformin 500 mg twice daily.  She has made significant changes in her diet.  She met with the diabetic educator.  If she has not changed her physical activity much but is motivated to do so once the weather is better.  She is getting some loose stools from the metformin.  She has lost 13 pounds.  History of Present Illness       Mental Health Follow-up:  Patient presents to follow-up on Anxiety.    Patient's anxiety since last visit has been:  Better  The patient is having other symptoms associated with anxiety.  Any significant life events: grief or loss and health concerns  Patient is feeling anxious or having panic attacks.  Patient has no concerns about alcohol or drug use.    Diabetes:   She presents for follow up of diabetes.  She is not checking blood glucose. She has no concerns regarding her diabetes at this time.  She is not experiencing numbness or burning in feet, excessive thirst, blurry vision, weight changes or redness, sores or blisters on feet. The patient has not had a diabetic eye exam in the last 12 months.          Hypertension: She presents for follow up of hypertension.  She does not check blood pressure  regularly outside of the clinic. Outside blood pressures have been over 140/90. She does not follow a low salt diet.      Today's PHQ-9         PHQ-9 Total Score: 3    PHQ-9 Q9 Thoughts of better off dead/self-harm past 2 weeks :   Not at all    How difficult have these problems made it for you to do your work, take care of things at home, or get along with other people: Not difficult at all  Today's GINO-7 Score: 9         Review of Systems   Constitutional, HEENT, cardiovascular, pulmonary, gi and gu systems are negative, except as otherwise noted.      Objective    BP (!) 138/94   Pulse 90   Temp 98.5  F (36.9  C) (Tympanic)   Resp 18   Wt (!) 159.9 kg (352 lb 9.6 oz)   LMP 11/27/2021 (Approximate)   SpO2 97%   Breastfeeding No   BMI 64.49 kg/m    Body mass index is 64.49 kg/m .  Physical Exam  Neurological:      Mental Status: She is alert.      Comments: Monofilament testing normal   Psychiatric:         Mood and Affect: Mood normal.         Thought Content: Thought content normal.         Judgment: Judgment normal.            Office Visit on 12/09/2022   Component Date Value Ref Range Status     Interpretation 12/09/2022 Negative for Intraepithelial Lesion or Malignancy (NILM)    Final     Comment 12/09/2022    Final                    Value:This result contains rich text formatting which cannot be displayed here.     Specimen Adequacy 12/09/2022 Satisfactory for evaluation, endocervical/transformation zone component absent   Final     Clinical Information 12/09/2022    Final                    Value:This result contains rich text formatting which cannot be displayed here.     LMP/Menopause Date 12/09/2022    Final                    Value:This result contains rich text formatting which cannot be displayed here.     Reflex Testing 12/09/2022 Yes if ASCUS   Final     Previous Abnormal? 12/09/2022     Final                    Value:This result contains rich text formatting which cannot be displayed here.     Performing Labs 12/09/2022    Final                    Value:This result contains rich text formatting which cannot be displayed here.     Sodium 12/09/2022 140  136 - 145 mmol/L Final     Potassium 12/09/2022 4.3  3.4 - 5.3 mmol/L Final     Chloride 12/09/2022 102  98 - 107 mmol/L Final     Carbon Dioxide (CO2) 12/09/2022 30 (H)  22 - 29 mmol/L Final     Anion Gap 12/09/2022 8  7 - 15 mmol/L Final     Urea Nitrogen 12/09/2022 11.6  6.0 - 20.0 mg/dL Final     Creatinine 12/09/2022 0.56  0.51 - 0.95 mg/dL Final     Calcium 12/09/2022 9.6  8.6 - 10.0 mg/dL Final     Glucose 12/09/2022 122 (H)  70 - 99 mg/dL Final     Alkaline Phosphatase 12/09/2022 71  35 - 104 U/L Final     AST 12/09/2022 37 (H)  10 - 35 U/L Final     ALT 12/09/2022 37 (H)  10 - 35 U/L Final     Protein Total 12/09/2022 7.3  6.4 - 8.3 g/dL Final     Albumin 12/09/2022 4.1  3.5 - 5.2 g/dL Final     Bilirubin Total 12/09/2022 0.3  <=1.2 mg/dL Final     GFR Estimate 12/09/2022 >90  >60 mL/min/1.73m2 Final    Effective December 21, 2021 eGFRcr in adults is calculated using the 2021 CKD-EPI creatinine equation which includes age and gender (Sunday et al., NEJ, DOI: 10.1056/DVJQgi7792264)     Hemoglobin A1C 12/09/2022 6.6 (H)  4.0 - 6.2 % Final     TSH 12/09/2022 1.33  0.30 - 4.20 uIU/mL Final     Vitamin D, Total (25-Hydroxy) 12/09/2022 16 (L)  20 - 75 ug/L Final     Hepatitis C Antibody 12/09/2022 Nonreactive  Nonreactive Final     METHODOLOGY 12/09/2022    Final                    Value:This result contains rich text formatting which cannot be displayed here.     RESULTS 12/09/2022    Final                    Value:This result contains rich text formatting which cannot be displayed here.     INTERPRETATION 12/09/2022    Final                    Value:This result contains rich text formatting which cannot be displayed here.     COMMENTS 12/09/2022     Final                    Value:This result contains rich text formatting which cannot be displayed here.     DISCLAIMER 12/09/2022    Final                    Value:This result contains rich text formatting which cannot be displayed here.     FACTOR V INTERPRETATION 12/09/2022    Final                    Value:This result contains rich text formatting which cannot be displayed here.     Specimen Description 12/09/2022    Final                    Value:This result contains rich text formatting which cannot be displayed here.     WBC Count 12/09/2022 7.2  4.0 - 11.0 10e3/uL Final     RBC Count 12/09/2022 5.27 (H)  3.80 - 5.20 10e6/uL Final     Hemoglobin 12/09/2022 13.6  11.7 - 15.7 g/dL Final     Hematocrit 12/09/2022 42.2  35.0 - 47.0 % Final     MCV 12/09/2022 80  78 - 100 fL Final     MCH 12/09/2022 25.8 (L)  26.5 - 33.0 pg Final     MCHC 12/09/2022 32.2  31.5 - 36.5 g/dL Final     RDW 12/09/2022 14.3  10.0 - 15.0 % Final     Platelet Count 12/09/2022 292  150 - 450 10e3/uL Final     % Neutrophils 12/09/2022 63  % Final     % Lymphocytes 12/09/2022 29  % Final     % Monocytes 12/09/2022 6  % Final     % Eosinophils 12/09/2022 2  % Final     % Basophils 12/09/2022 0  % Final     % Immature Granulocytes 12/09/2022 0  % Final     NRBCs per 100 WBC 12/09/2022 0  <1 /100 Final     Absolute Neutrophils 12/09/2022 4.5  1.6 - 8.3 10e3/uL Final     Absolute Lymphocytes 12/09/2022 2.1  0.8 - 5.3 10e3/uL Final     Absolute Monocytes 12/09/2022 0.4  0.0 - 1.3 10e3/uL Final     Absolute Eosinophils 12/09/2022 0.1  0.0 - 0.7 10e3/uL Final     Absolute Basophils 12/09/2022 0.0  0.0 - 0.2 10e3/uL Final     Absolute Immature Granulocytes 12/09/2022 0.0  <=0.4 10e3/uL Final     Absolute NRBCs 12/09/2022 0.0  10e3/uL Final

## 2023-02-09 NOTE — NURSING NOTE
Patient is here for medication check for metformin. States has been having diarrhea.     Previous A1C is at goal of <8  Lab Results   Component Value Date    A1C 6.6 12/09/2022     Urine Microalbumin/Creat:    No results found for: MICROL  No results found for: UMALCR  Creatinine Urine   Date Value Ref Range Status   03/27/2019 119 mg/dL Final     Foot exam due  Eye exam due     Tobacco User no  Patient is not on a daily aspirin  Patient is not on a Statin.  Blood pressure today of:     BP Readings from Last 1 Encounters:   02/09/23 (!) 138/94      is not at the goal of <139/89 for diabetics.    Denisse Sweet LPN on 2/9/2023 at 9:52 AM        Patient's last menstrual period was 11/27/2021 (approximate).  Medication Reconciliation: complete      Denisse Sweet LPN 2/9/2023 9:52 AM       Advance care directive on file? no  Advance care directive provided to patient? no       Denisse Sweet LPN

## 2023-03-10 DIAGNOSIS — E66.01 MORBID OBESITY (H): Primary | ICD-10-CM

## 2023-03-13 ENCOUNTER — MYC MEDICAL ADVICE (OUTPATIENT)
Dept: FAMILY MEDICINE | Facility: OTHER | Age: 28
End: 2023-03-13
Payer: COMMERCIAL

## 2023-03-13 DIAGNOSIS — E66.01 MORBID OBESITY (H): ICD-10-CM

## 2023-03-14 NOTE — TELEPHONE ENCOUNTER
"  Last Prescription Date: 12/09/2023  Last Qty/Refills: 90 / 01  Last Office Visit: 02/09/2023  Future Office Visit: none     Requested Prescriptions   Pending Prescriptions Disp Refills     metFORMIN (GLUCOPHAGE) 500 MG tablet [Pharmacy Med Name: METFORMIN 500MG TABLETS] 90 tablet 1     Sig: TAKE 1 TABLET(500 MG) BY MOUTH TWICE DAILY WITH MEALS       Biguanide Agents Passed - 3/10/2023  4:00 AM        Passed - Patient is age 10 or older        Passed - Patient has documented A1c within the specified period of time.     If HgbA1C is 8 or greater, it needs to be on file within the past 3 months.  If less than 8, must be on file within the past 6 months.     Recent Labs   Lab Test 12/09/22  1111 09/06/16  2108   A1C 6.6*  --    [id number]  --  5.4             Passed - Patient's CR is NOT>1.4 OR Patient's EGFR is NOT<45 within past 12 mos.     Recent Labs   Lab Test 12/09/22  1111 04/02/20  0222   GFRESTIMATED >90 >90   GFRESTBLACK  --  >90       Recent Labs   Lab Test 12/09/22  1111   CR 0.56             Passed - Patient does NOT have a diagnosis of CHF.        Passed - Medication is active on med list        Passed - Patient is not pregnant        Passed - Patient has not had a positive pregnancy test within the past 12 mos.         Passed - Recent (6 mo) or future (30 days) visit within the authorizing provider's specialty     Patient had office visit in the last 6 months or has a visit in the next 30 days with authorizing provider or within the authorizing provider's specialty.  See \"Patient Info\" tab in inbasket, or \"Choose Columns\" in Meds & Orders section of the refill encounter.                 "

## 2023-04-15 ENCOUNTER — HOSPITAL ENCOUNTER (EMERGENCY)
Facility: OTHER | Age: 28
Discharge: HOME | End: 2023-04-15
Attending: PHYSICIAN ASSISTANT | Admitting: PHYSICIAN ASSISTANT
Payer: COMMERCIAL

## 2023-04-15 VITALS
DIASTOLIC BLOOD PRESSURE: 104 MMHG | HEIGHT: 62 IN | WEIGHT: 293 LBS | HEART RATE: 102 BPM | TEMPERATURE: 98.9 F | RESPIRATION RATE: 18 BRPM | SYSTOLIC BLOOD PRESSURE: 156 MMHG | OXYGEN SATURATION: 99 % | BODY MASS INDEX: 53.92 KG/M2

## 2023-04-15 DIAGNOSIS — L08.9 INFECTED PIERCED LIP: ICD-10-CM

## 2023-04-15 DIAGNOSIS — K13.0 CELLULITIS OF LIP: ICD-10-CM

## 2023-04-15 DIAGNOSIS — S01.531A INFECTED PIERCED LIP: ICD-10-CM

## 2023-04-15 PROCEDURE — 99283 EMERGENCY DEPT VISIT LOW MDM: CPT | Performed by: PHYSICIAN ASSISTANT

## 2023-04-15 RX ORDER — SULFAMETHOXAZOLE/TRIMETHOPRIM 800-160 MG
1 TABLET ORAL 2 TIMES DAILY
Qty: 14 TABLET | Refills: 0 | Status: SHIPPED | OUTPATIENT
Start: 2023-04-15 | End: 2023-06-10

## 2023-04-15 ASSESSMENT — ENCOUNTER SYMPTOMS
LIGHT-HEADEDNESS: 0
CHILLS: 0
HEADACHES: 0
FEVER: 0
FATIGUE: 0

## 2023-04-15 ASSESSMENT — ACTIVITIES OF DAILY LIVING (ADL): ADLS_ACUITY_SCORE: 35

## 2023-04-15 NOTE — ED PROVIDER NOTES
"        EMERGENCY DEPARTMENT ENCOUNTER      NAME: Simi Godwin  AGE: 28 year old female  YOB: 1995  MRN: [id number]  EVALUATION DATE & TIME: 4/15/2023  6:12 PM    PCP: Sho Alejandra    ED PROVIDER: Matteo Berrios PA-C       CHIEF COMPLAINT:  Chief Complaint   Patient presents with     Wound Check       FINAL IMPRESSION:  1. Cellulitis of lip    2. Infected pierced lip        ED COURSE, MEDICAL DECISION MAKING, ASSESSMENT, AND PLAN:      The patient was interviewed and examined.  HPI and physical exam as below.  Differential diagnosis and MDM Key Documentation Elements as below.  Vitals and triage note were reviewed.  BP (!) 156/104   Pulse 102   Temp 98.9  F (37.2  C)   Resp 18   Ht 1.575 m (5' 2\")   Wt (!) 163.3 kg (360 lb)   LMP 03/25/2023   SpO2 99%   BMI 65.84 kg/m      Overall Impression/Treatment Plan/Discharge Info/Follow-up/Medical Necessity for Admission:  Left lower lip infection secondary to piercing  -Patient is a known type II diabetic on metformin.  Slightly tachycardic otherwise afebrile.  Patient was in no acute distress but some discomfort from pain. Piercing present to left lower lip with swelling, and tenderness.  Piercing was easily removed here in the ER.  Just a very tiny amount of purulent discharge expressed.  Otherwise no fluctuance noted.  No tongue swelling.  No signs of dental pain or abscess.  No submental tenderness.  Able to open mouth fully without trismus, no signs of Armando's angina  -Patient to use Bactrim 1 tablet every 12 hours for 7 days.  Follow-up primary care in 3 days for recheck.  Return to the ER for any worsening of symptoms.    Reassessments, Medications, Interventions, & Response to Treatments:  None    Consultations:  None    Decision Rules, Medical Calculators, and Risk Stratification Tools:  None    MDM Key Documentation Elements for Patient's Evaluation:  1. Differential diagnosis to include high risk considerations: " Differential includes but is not limited to lip cellulitis, abscess, post.  Swelling  2. Escalation to admission/observation considered: Admission/observation considered, patient does not meet mission criteria  3. Discussions and management with other clinicians:  No  3a. Independent interpretation of testing performed by another health professional:  No  3b. Discussion of management or test interpretation with another health professional: No  4. Independent interpretation of tests:  Review of 1 test result(s) ordered prior to this encounter  5. Discussion of test interpretations with radiology:  No  6. History obtained from source other than patient/assessment requiring an independent historian:  No  7. Review of non-ED records:  review of 1 records  8. Diagnostic tests considered but not ultimately performed/deferred:  -Consider laboratory studies but no signs of significant infection or fever  9. Prescription medications considered but not prescribed:  -Considered opiates but pain may be managed with use of ibuprofen and Tylenol  10. Chronic conditions affecting care:  -Obesity and type 2 diabetes  11. Care affected by social determinants of health:  -None    The patient's management involved:   -Low acuity visit    Pertinent Labs & Imaging studies reviewed. (See chart for details)     No results found for: ABORH      A shared decision making model was used. Plan and all results were discussed  Time was taken to answer all questions. Patient and/or associated parties understood and were agreeable to treatment plan.        PPE worn during patient evaluation:  Mask: Yes, surgical  Eye Protection: No  Gown: No  Hair cover: No  Face Shield: No  Patient wearing a mask: yes      MEDICATIONS GIVEN IN THE EMERGENCY:  Medications - No data to display    NEW PRESCRIPTIONS STARTED AT TODAY'S ER VISIT:  New Prescriptions    SULFAMETHOXAZOLE-TRIMETHOPRIM (BACTRIM DS) 800-160 MG TABLET    Take 1 tablet by mouth 2 times daily           =================================================================    HPI  Smii Godwin is a pleasant 28 year old female with history of type 2 diabetes and obesity who presents to the ER today for an infected piercing of her left lower lip.  That she had her left lower lip pierced about 2 days ago.  Started having increasing swelling and discomfort yesterday.  Patient noticed increasing swelling and pain today as well as a slight amount of purulent discharge coming from her piercing site.  Patient presents to the ER because she is unable to get her piercing out secondary to the swelling.  Patient is otherwise denying any facial or throat swelling, hard time breathing or swallowing, fever, chills, or fatigue.      REVIEW OF SYSTEMS   Review of Systems   Constitutional: Negative for chills, fatigue and fever.   HENT:        Lip swelling   Neurological: Negative for light-headedness and headaches.       Remainder of systems reviewed, unless noted in HPI all others negative.      PAST MEDICAL HISTORY:  Past Medical History:   Diagnosis Date     Obesity complicating pregnancy 2016     Polyhydramnios      Supraventricular tachycardia (H)        PAST SURGICAL HISTORY:  Past Surgical History:   Procedure Laterality Date      SECTION N/A 2019    Procedure:  SECTION;  Surgeon: Tomer Pereira MD;  Location:  OR      OR CATH ABLATION NON-CARDIAC ENDOVASCULAR NTAP      AVNRT     RELEASE CARPAL TUNNEL Bilateral      TONSILLECTOMY             CURRENT MEDICATIONS:    Current Outpatient Medications   Medication Instructions     blood glucose (NO BRAND SPECIFIED) lancets standard Use to test blood sugar 1 time daily.     blood glucose (NO BRAND SPECIFIED) test strip Use to test blood sugar 1 time daily.     blood glucose monitoring (NO BRAND SPECIFIED) meter device kit Use to test blood sugar 1 times daily.     Ferrous Sulfate (IRON PO) No dose, route, or frequency recorded.      "metFORMIN (GLUCOPHAGE) 500 MG tablet TAKE 1 TABLET(500 MG) BY MOUTH TWICE DAILY WITH MEALS     metFORMIN (GLUCOPHAGE) 500 mg, Oral, 2 TIMES DAILY WITH MEALS     omeprazole (PRILOSEC) 20 mg, Oral     semaglutide (OZEMPIC) 0.25 mg, Subcutaneous, EVERY 7 DAYS     sertraline (ZOLOFT) 50 mg, Oral, DAILY       ALLERGIES:  No Known Allergies    FAMILY HISTORY:  Family History   Problem Relation Age of Onset     Coronary Artery Disease Father 59     Thrombosis Father      Heart Failure Brother 30     Diabetes Maternal Grandfather      Cancer Paternal Grandfather      Breast Cancer No family hx of      Colon Cancer No family hx of        SOCIAL HISTORY:   Social History     Socioeconomic History     Marital status: Single   Tobacco Use     Smoking status: Former     Packs/day: 0.25     Years: 2.00     Pack years: 0.50     Types: Vaping Device, Cigarettes     Quit date: 2019     Years since quittin.2     Smokeless tobacco: Never     Tobacco comments:     1-3 a day   Vaping Use     Vaping status: Every Day     Substances: Nicotine, Flavoring     Devices: Pre-filled or refillable cartridge   Substance and Sexual Activity     Alcohol use: Yes     Alcohol/week: 0.0 standard drinks of alcohol     Comment: occasional -1-2 drinks a week     Drug use: No     Comment: Drug use: No     Sexual activity: Yes     Partners: Male     Birth control/protection: None   Social History Narrative    Engaged. Works in [employer].    Boy (2016), Girl()        Vaping nicotine, 100 puffs/day       PHYSICAL EXAM    VITAL SIGNS: BP (!) 156/104   Pulse 102   Temp 98.9  F (37.2  C)   Resp 18   Ht 1.575 m (5' 2\")   Wt (!) 163.3 kg (360 lb)   LMP 2023   SpO2 99%   BMI 65.84 kg/m      Patient Vitals for the past 24 hrs:   BP Temp Pulse Resp SpO2 Height Weight   04/15/23 1810 (!) 156/104 -- -- -- -- -- --   04/15/23 1806 -- 98.9  F (37.2  C) 102 18 99 % 1.575 m (5' 2\") (!) 163.3 kg (360 lb)       Physical Exam  Vitals " and nursing note reviewed.   Constitutional:       Appearance: Normal appearance. She is obese.   HENT:      Mouth/Throat:      Pharynx: Oropharynx is clear.      Comments: Piercing present to left lower lip with swelling, and tenderness.  Piercing was easily removed here in the ER.  Just a very tiny amount of purulent discharge expressed.  Otherwise no fluctuance noted.  No tongue swelling.  No signs of dental pain or abscess.  No submental tenderness.  Able to open mouth fully without trismus, no signs of Armando's angina  Eyes:      Conjunctiva/sclera: Conjunctivae normal.   Cardiovascular:      Rate and Rhythm: Regular rhythm. Tachycardia present.      Pulses: Normal pulses.      Heart sounds: Normal heart sounds.   Pulmonary:      Effort: Pulmonary effort is normal.      Breath sounds: Normal breath sounds.   Musculoskeletal:         General: Normal range of motion.      Cervical back: Normal range of motion and neck supple.   Skin:     General: Skin is warm and dry.      Capillary Refill: Capillary refill takes less than 2 seconds.   Neurological:      Mental Status: She is alert.          LABS & RADIOLOGY:  All pertinent labs reviewed and interpreted. Reviewed all pertinent imaging. Please see official radiology report.     No orders to display       Kale GIRALDO PA-C, personally performed the services described in this documentation, and it is both accurate and complete.     Matteo Berrios PA-C  04/15/23 6805

## 2023-04-15 NOTE — ED TRIAGE NOTES
Pt had her lower lip pierced 2 days ago, today redness, and pus noted, she was unable to remove this herself and the piercing place was closed.     Triage Assessment     Row Name 04/15/23 5065       Triage Assessment (Adult)    Airway WDL WDL       Respiratory WDL    Respiratory WDL WDL       Skin Circulation/Temperature WDL    Skin Circulation/Temperature WDL WDL       Cardiac WDL    Cardiac WDL WDL       Peripheral/Neurovascular WDL    Peripheral Neurovascular WDL WDL       Cognitive/Neuro/Behavioral WDL    Cognitive/Neuro/Behavioral WDL WDL

## 2023-04-16 NOTE — DISCHARGE INSTRUCTIONS
-Take Bactrim 1 tablet every 12 hours for 7 days for treatment of lower lip infection  -Recommend following with your primary care doctor in 2 to 3 days for wound recheck  -Return to the ER for any worsening of symptoms despite being on oral antibiotics to include worsening swelling, pain, fever, hard time breathing or swallowing, or any other concerning symptom

## 2023-05-06 ENCOUNTER — HEALTH MAINTENANCE LETTER (OUTPATIENT)
Age: 28
End: 2023-05-06

## 2023-05-24 NOTE — PLAN OF CARE
Improving  Pain (Postpartum  Delivery)  Acceptable Pain Control  3/29/2019 014 - Improving by Bianka Montemayor, RN  Prevent or Manage Pain  3/29/2019 014 by Bianka Montemayor, RN  Flowsheets  Taken 3/29/2019 0141   Pain Management Interventions  medication (see MAR);ambulation/increased activity;around-the-clock dosing utilized;pain management plan reviewed with patient/caregiver  Note  Patient was able to be up in halls ambulating this evening with stand by assistance and tolerated very well. Has been taking Toradol and Percocet for pain management.       No

## 2023-06-07 ENCOUNTER — OFFICE VISIT (OUTPATIENT)
Dept: FAMILY MEDICINE | Facility: OTHER | Age: 28
End: 2023-06-07
Attending: NURSE PRACTITIONER
Payer: COMMERCIAL

## 2023-06-07 VITALS
WEIGHT: 293 LBS | RESPIRATION RATE: 17 BRPM | BODY MASS INDEX: 53.92 KG/M2 | SYSTOLIC BLOOD PRESSURE: 136 MMHG | TEMPERATURE: 99.5 F | DIASTOLIC BLOOD PRESSURE: 86 MMHG | HEIGHT: 62 IN | HEART RATE: 100 BPM | OXYGEN SATURATION: 96 %

## 2023-06-07 DIAGNOSIS — R50.9 FEVER IN ADULT: Primary | ICD-10-CM

## 2023-06-07 DIAGNOSIS — T14.8XXA ABRASION: ICD-10-CM

## 2023-06-07 DIAGNOSIS — J02.0 STREPTOCOCCAL SORE THROAT: ICD-10-CM

## 2023-06-07 DIAGNOSIS — R11.0 NAUSEA: ICD-10-CM

## 2023-06-07 LAB
BASOPHILS # BLD MANUAL: 0 10E3/UL (ref 0–0.2)
BASOPHILS NFR BLD MANUAL: 0 %
EOSINOPHIL # BLD MANUAL: 0.1 10E3/UL (ref 0–0.7)
EOSINOPHIL NFR BLD MANUAL: 3 %
ERYTHROCYTE [DISTWIDTH] IN BLOOD BY AUTOMATED COUNT: 15.4 % (ref 10–15)
FLUAV RNA SPEC QL NAA+PROBE: NEGATIVE
FLUBV RNA RESP QL NAA+PROBE: NEGATIVE
GROUP A STREP BY PCR: DETECTED
HCT VFR BLD AUTO: 42.1 % (ref 35–47)
HGB BLD-MCNC: 13.6 G/DL (ref 11.7–15.7)
HOLD SPECIMEN: NORMAL
LYMPHOCYTES # BLD MANUAL: 1.4 10E3/UL (ref 0.8–5.3)
LYMPHOCYTES NFR BLD MANUAL: 37 %
MCH RBC QN AUTO: 25.2 PG (ref 26.5–33)
MCHC RBC AUTO-ENTMCNC: 32.3 G/DL (ref 31.5–36.5)
MCV RBC AUTO: 78 FL (ref 78–100)
MONOCYTES # BLD MANUAL: 0.1 10E3/UL (ref 0–1.3)
MONOCYTES NFR BLD MANUAL: 2 %
NEUTROPHILS # BLD MANUAL: 2.3 10E3/UL (ref 1.6–8.3)
NEUTROPHILS NFR BLD MANUAL: 58 %
PLAT MORPH BLD: ABNORMAL
PLATELET # BLD AUTO: 160 10E3/UL (ref 150–450)
RBC # BLD AUTO: 5.4 10E6/UL (ref 3.8–5.2)
RBC MORPH BLD: ABNORMAL
RSV RNA SPEC NAA+PROBE: NEGATIVE
SARS-COV-2 RNA RESP QL NAA+PROBE: NEGATIVE
VARIANT LYMPHS BLD QL SMEAR: PRESENT
WBC # BLD AUTO: 3.9 10E3/UL (ref 4–11)

## 2023-06-07 PROCEDURE — C9803 HOPD COVID-19 SPEC COLLECT: HCPCS | Performed by: NURSE PRACTITIONER

## 2023-06-07 PROCEDURE — 85007 BL SMEAR W/DIFF WBC COUNT: CPT | Mod: ZL | Performed by: NURSE PRACTITIONER

## 2023-06-07 PROCEDURE — 36415 COLL VENOUS BLD VENIPUNCTURE: CPT | Mod: ZL | Performed by: NURSE PRACTITIONER

## 2023-06-07 PROCEDURE — 99213 OFFICE O/P EST LOW 20 MIN: CPT | Performed by: NURSE PRACTITIONER

## 2023-06-07 PROCEDURE — 87637 SARSCOV2&INF A&B&RSV AMP PRB: CPT | Mod: ZL | Performed by: NURSE PRACTITIONER

## 2023-06-07 PROCEDURE — G0463 HOSPITAL OUTPT CLINIC VISIT: HCPCS | Performed by: NURSE PRACTITIONER

## 2023-06-07 PROCEDURE — 87651 STREP A DNA AMP PROBE: CPT | Mod: ZL | Performed by: NURSE PRACTITIONER

## 2023-06-07 PROCEDURE — 85027 COMPLETE CBC AUTOMATED: CPT | Mod: ZL | Performed by: NURSE PRACTITIONER

## 2023-06-07 RX ORDER — AMOXICILLIN 500 MG/1
500 CAPSULE ORAL 2 TIMES DAILY
Qty: 20 CAPSULE | Refills: 0 | Status: SHIPPED | OUTPATIENT
Start: 2023-06-07 | End: 2023-06-17

## 2023-06-07 ASSESSMENT — ENCOUNTER SYMPTOMS
CHILLS: 1
DIAPHORESIS: 1
MUSCULOSKELETAL NEGATIVE: 1
CARDIOVASCULAR NEGATIVE: 1
HEADACHES: 0
COUGH: 0
RESPIRATORY NEGATIVE: 1
VOMITING: 1
WEAKNESS: 1
FATIGUE: 1
NAUSEA: 1
EYE PAIN: 1
APPETITE CHANGE: 1
DIZZINESS: 1
FEVER: 1

## 2023-06-07 ASSESSMENT — PAIN SCALES - GENERAL: PAINLEVEL: NO PAIN (0)

## 2023-06-07 NOTE — NURSING NOTE
Patient presents hot flashes, chills, fevers, eye sensitivity, says her skin is tender to the touch.

## 2023-06-07 NOTE — PROGRESS NOTES
CommentTraciholden Godwin  1995    ASSESSMENT/PLAN:   1. Fever in adult  Patient presents with 5 to 6 days of ocular pain and pressure, feeling foggy, chills, hot flashes, fever and some nausea.  She is febrile in clinic 99.5.  On exam lungs are clear to auscultation, no wheezing, she does not have any cough.  Oxygen 96%.  She has borderline tachycardic, 100.  Reviewed with patient her symptoms may be viral in nature, I am concerned for influenza A or B, as we are still seeing high volumes in the community.  We can also test for strep throat.  We will obtain CBC due to fevers.  Patient is agreeable to plan.  CBC returned normal, slightly low WBC.  She tested negative for influenza, RSV and COVID-19.  She did test positive for strep throat.  See plan below.  Review at home remedies including Tylenol, ibuprofen, cold compress for management of fever, chills and body aches.  Recommend staying well-hydrated with fluids.  - Group A Streptococcus PCR Throat Swab  - Symptomatic Influenza A/B, RSV, & SARS-CoV2 PCR (COVID-19) Nose  - CBC and Differential; Future    2. Nausea  Recommend staying well-hydrated with fluids as able, if she is unable to tolerate oral intake she knows to notify clinic.    3. Abrasion, right forearm.  Patient was scratched by resident on right forearm.  Reassurance divided to patient that abrasion appears to be intact, not erythematous, no discharge and does not appear infected.  Patient would feel more comfortable with lab work to rule out infection.  Reviewed with patient that WBC count would reflect inflammation and infection, she is currently ill with likely viral infection therefore it may be mildly elevated and does not indicate a true skin infection.  I do think it is reasonable to obtain CBC for baseline.  She will monitor for any localized signs of infection.     4. Streptococcal sore throat  Strep test returned positive.  Results communicated to patient.  Recommend starting patient on  amoxicillin twice a day for 10 days.  She should replace toothbrush after 2 to 3 days on antibiotics.  She may take Tylenol ibuprofen as needed for discomfort or fever.  She is considered contagious until 24 hours on antibiotic treatment.  - amoxicillin (AMOXIL) 500 MG capsule; Take 1 capsule (500 mg) by mouth 2 times daily for 10 days  Dispense: 20 capsule; Refill: 0    Patient agrees with plan of care and verbalizes understating. AVS printed. Patient education provided verbally and written instructions provided as requested. Patient made aware of emergent sings and symptoms to monitor for and when to seek additional care/follow up.     SUBJECTIVE:   CHIEF COMPLAINT/ REASON FOR VISIT  Hot and cold sweats, nausea, feeling off    HISTORY OF PRESENT ILLNESS  Simi Godwin is a pleasant 28 year old female presents [facility] today concerns for hot cold sweats nausea, fever and feeling off.    She states Friday night she developed pressure and discomfort behind bilateral eyes.  She then developed a stuffy nose.  The past 2 days she continues to feel hot, chilled and sweaty.  She states she is feeling foggy.  At times her skin is sore to touch.  4 days ago at work she had a fever of 101.3.  She has not checked her temperature the past few days.    She denies any cough, sore throat or ear pain.  She has felt intermittently nauseated.  She did drink water this morning and had 1 episode of emesis.  She states she has diarrhea at baseline due to her metformin.    She has a scratch on her right forearm from a resident.  She would like to make sure she does not have a blood-borne infection.    I have reviewed the nursing notes.  I have reviewed allergies, medication list, problem list, and past medical history.    REVIEW OF SYSTEMS  Review of Systems   Constitutional: Positive for appetite change, chills, diaphoresis, fatigue and fever.   HENT: Negative.    Eyes: Positive for pain.   Respiratory: Negative.  Negative  "for cough.    Cardiovascular: Negative.  Negative for chest pain.   Gastrointestinal: Positive for nausea and vomiting.   Genitourinary: Negative.    Musculoskeletal: Negative.    Neurological: Positive for dizziness and weakness. Negative for headaches.   All other systems reviewed and are negative.     VITAL SIGNS  Vitals:    06/07/23 1313   BP: 136/86   Pulse: 100   Resp: 17   Temp: 99.5  F (37.5  C)   TempSrc: Tympanic   SpO2: 96%   Weight: (!) 156.9 kg (346 lb)   Height: 1.575 m (5' 2\")      Body mass index is 63.28 kg/m .    OBJECTIVE:   PHYSICAL EXAM  Physical Exam  Vitals reviewed.   Constitutional:       Appearance: Normal appearance. She is not ill-appearing or toxic-appearing.   HENT:      Head: Normocephalic and atraumatic.      Right Ear: Tympanic membrane, ear canal and external ear normal.      Left Ear: Tympanic membrane, ear canal and external ear normal.      Nose: No congestion or rhinorrhea.      Mouth/Throat:      Pharynx: Posterior oropharyngeal erythema present. No oropharyngeal exudate.   Eyes:      Conjunctiva/sclera: Conjunctivae normal.   Cardiovascular:      Rate and Rhythm: Normal rate and regular rhythm.      Pulses: Normal pulses.      Heart sounds: Normal heart sounds.   Pulmonary:      Effort: Pulmonary effort is normal.      Breath sounds: Normal breath sounds. No wheezing.   Abdominal:      Tenderness: There is no abdominal tenderness.   Musculoskeletal:      Cervical back: Neck supple. No tenderness.      Right lower leg: No edema.      Left lower leg: No edema.   Lymphadenopathy:      Cervical: No cervical adenopathy.   Skin:     Capillary Refill: Capillary refill takes less than 2 seconds.      Findings: No rash.      Comments: Right forearm- abrasion, intact. Non erythematous, no drainage, no pain.    Neurological:      General: No focal deficit present.      Mental Status: She is alert and oriented to person, place, and time.   Psychiatric:         Mood and Affect: Mood " normal.         Behavior: Behavior normal.         Thought Content: Thought content normal.         Judgment: Judgment normal.        DIAGNOSTICS  Results for orders placed or performed in visit on 06/07/23   Symptomatic Influenza A/B, RSV, & SARS-CoV2 PCR (COVID-19) Nose     Status: Normal    Specimen: Nose; Swab   Result Value Ref Range    Influenza A PCR Negative Negative    Influenza B PCR Negative Negative    RSV PCR Negative Negative    SARS CoV2 PCR Negative Negative    Narrative    Testing was performed using the Xpert Xpress CoV2/Flu/RSV Assay on the Cepheid GeneXpert Instrument. This test should be ordered for the detection of SARS-CoV-2, influenza, and RSV viruses in individuals who meet clinical and/or epidemiological criteria. Test performance is unknown in asymptomatic patients. This test is for in vitro diagnostic use under the FDA EUA for laboratories certified under CLIA to perform high or moderate complexity testing. This test has not been FDA cleared or approved. A negative result does not rule out the presence of PCR inhibitors in the specimen or target RNA in concentration below the limit of detection for the assay. If only one viral target is positive but coinfection with multiple targets is suspected, the sample should be re-tested with another FDA cleared, approved, or authorized test, if coinfection would change clinical management. This test was validated by the [facility]. These laboratories are certified under the Clinical Laboratory Improvement Amendments of 1988 (CLIA-88) as qualified to perform high complexity laboratory testing.   CBC with platelets and differential     Status: Abnormal   Result Value Ref Range    WBC Count 3.9 (L) 4.0 - 11.0 10e3/uL    RBC Count 5.40 (H) 3.80 - 5.20 10e6/uL    Hemoglobin 13.6 11.7 - 15.7 g/dL    Hematocrit 42.1 35.0 - 47.0 %    MCV 78 78 - 100 fL    MCH 25.2 (L) 26.5 - 33.0 pg    MCHC 32.3 31.5 - 36.5 g/dL    RDW 15.4 (H) 10.0 -  15.0 %    Platelet Count 160 150 - 450 10e3/uL   Extra Tube     Status: None (In process)    Narrative    The following orders were created for panel order Extra Tube.  Procedure                               Abnormality         Status                     ---------                               -----------         ------                     Extra Serum Separator Tu...[[id number]]                      In process                   Please view results for these tests on the individual orders.   Manual Differential     Status: Abnormal   Result Value Ref Range    % Neutrophils 58 %    % Lymphocytes 37 %    % Monocytes 2 %    % Eosinophils 3 %    % Basophils 0 %    Absolute Neutrophils 2.3 1.6 - 8.3 10e3/uL    Absolute Lymphocytes 1.4 0.8 - 5.3 10e3/uL    Absolute Monocytes 0.1 0.0 - 1.3 10e3/uL    Absolute Eosinophils 0.1 0.0 - 0.7 10e3/uL    Absolute Basophils 0.0 0.0 - 0.2 10e3/uL    RBC Morphology Confirmed RBC Indices     Platelet Assessment  Automated Count Confirmed. Platelet morphology is normal.     Automated Count Confirmed. Platelet morphology is normal.    Reactive Lymphocytes Present (A) None Seen   Group A Streptococcus PCR Throat Swab     Status: Abnormal    Specimen: Throat; Swab   Result Value Ref Range    Group A strep by PCR Detected (A) Not Detected    Narrative    The Xpert Xpress Strep A test, performed on the [employer], is a rapid, qualitative in vitro diagnostic test for the detection of Streptococcus pyogenes (Group A ß-hemolytic Streptococcus, Strep A) in throat swab specimens from patients with signs and symptoms of pharyngitis. The Xpert Xpress Strep A test can be used as an aid in the diagnosis of Group A Streptococcal pharyngitis. The assay is not intended to monitor treatment for Group A Streptococcus infections. The Xpert Xpress Strep A test utilizes an automated real-time polymerase chain reaction (PCR) to detect Streptococcus pyogenes DNA.   CBC and Differential      Status: Abnormal    Narrative    The following orders were created for panel order CBC and Differential.  Procedure                               Abnormality         Status                     ---------                               -----------         ------                     CBC with platelets and d...[[id number]]  Abnormal            Final result               Manual Differential[[id number]]          Abnormal            Final result                 Please view results for these tests on the individual orders.        Kimberly Palacios NP  [facility]

## 2023-06-07 NOTE — LETTER
June 7, 2023      Simi Godwin  [address] [place] [id number]        To Whom It May Concern:    Simi Godwin was evaluated today, June 7, 2023, at [facility]. Please excuse patient from work due to current illness. Please excuse her from work today and tomorrow.  Simi Godwin may return to work without restrictions if their symptoms are improving and they have been fever free for 24 hours, without the use of fever reducing medications.    Thank you for your understanding.              Sincerely,        AL Benjamin., R.N., APRN CNP

## 2023-06-07 NOTE — PATIENT INSTRUCTIONS
Flonase nasal spray, twice daily.  This is an intranasal steroid.  This will help with sinus congestion and postnasal drip.  Afrin nasal spray is also an option.  This is a little bit stronger. I would not recommend using this longer than 3 days at this and cause rebound congestion.  Consider picking up a daily nondrowsy antihistamine such as Allegra, Claritin or Zyrtec.  This should be taken once daily.  This can help with sinus drainage, congestion which should also improve sore throat and reduce drainage causing a cough.  Stay well hydrated. Push water. If you need to flavor the water that is ok.  Caffeine does not help with dehydration, this actually worsens dehydration.  Avoid pop and coffee.  Adequate rest.  Your body needs time to recover to fight off this infection.  Tylenol (acetaminophen) and NSAIDS (Aleve, Advil, ibuprofen) as needed for pain, discomfort, sore throat, headache, fever, chills or body aches.  Ibuprofen 400 mg-600 mg every 6 hours as needed pain/headache or fever.  Acetaminophen  (Tylenol) 500 mg or 650 mg every 4 hours.  For severe pain may use extra strength acetaminophen 1000 mg every 8 hours.

## 2023-06-10 ENCOUNTER — NURSE TRIAGE (OUTPATIENT)
Dept: NURSING | Facility: CLINIC | Age: 28
End: 2023-06-10
Payer: COMMERCIAL

## 2023-06-10 ENCOUNTER — OFFICE VISIT (OUTPATIENT)
Dept: FAMILY MEDICINE | Facility: OTHER | Age: 28
End: 2023-06-10
Payer: COMMERCIAL

## 2023-06-10 VITALS
SYSTOLIC BLOOD PRESSURE: 128 MMHG | HEART RATE: 108 BPM | WEIGHT: 293 LBS | DIASTOLIC BLOOD PRESSURE: 82 MMHG | TEMPERATURE: 101.7 F | OXYGEN SATURATION: 94 % | BODY MASS INDEX: 51.91 KG/M2 | RESPIRATION RATE: 20 BRPM | HEIGHT: 63 IN

## 2023-06-10 DIAGNOSIS — R50.9 FEVER, UNSPECIFIED FEVER CAUSE: Primary | ICD-10-CM

## 2023-06-10 PROCEDURE — 99213 OFFICE O/P EST LOW 20 MIN: CPT | Performed by: FAMILY MEDICINE

## 2023-06-10 PROCEDURE — G0463 HOSPITAL OUTPT CLINIC VISIT: HCPCS

## 2023-06-10 RX ORDER — DOXYCYCLINE HYCLATE 100 MG
100 TABLET ORAL 2 TIMES DAILY
Qty: 28 TABLET | Refills: 0 | Status: SHIPPED | OUTPATIENT
Start: 2023-06-10 | End: 2023-06-24

## 2023-06-10 ASSESSMENT — PAIN SCALES - GENERAL: PAINLEVEL: MILD PAIN (2)

## 2023-06-10 NOTE — NURSING NOTE
Pt presents to clinic today for fever x1 week. Patient was diagnosed with strep on Wednesday this past week but fever has been ongoing for 1 week and patient reports Tylenol does not help bring fever down.       FOOD SECURITY SCREENING QUESTIONS:    The next two questions are to help us understand your food security.  If you are feeling you need any assistance in this area, we have resources available to support you today.    Hunger Vital Signs:  Within the past 12 months we worried whether our food would run out before we got money to buy more. Never  Within the past 12 months the food we bought just didn't last and we didn't have money to get more. Never      Medication Reconciliation: complete  Fatmata Pollard LPN,LPN on 6/10/2023 at 2:22 PM

## 2023-06-10 NOTE — TELEPHONE ENCOUNTER
Nurse Triage SBAR    Is this a 2nd Level Triage? YES, LICENSED PRACTITIONER REVIEW IS REQUIRED    Situation: Patient calling with fever concerns    Background: Patient seen in clinic 6/7/10 for persistent fever. Multiple testing done and patient positive for strep. Now on antibiotics x 4 days. Last temp 101.5. Patient states fever has been consistently in that range for entire hx of this illness. Patient denies all other viral symptoms. Patient states her only real complaint that brought her to the clinic was the persistent fever. Currently, patient has had fever x 1 week. Patient states she is not pregnant. Patient had S/O buy a different thermometer to ensure readings are correct.      Assessment: Persistent fever.    Protocol Recommended Disposition:   See in Office Today or Tomorrow    Recommendation: Advised patient to use Walk in Clinic to be seen today. Patient will follow disposition.    To Walk in clinic.    Does the patient meet one of the following criteria for ADS visit consideration? 16+ years old, with an [facility] PCP     TIP  Providers, please consider if this condition is appropriate for management at one of our Acute and Diagnostic Services sites.     If patient is a good candidate, please use dotphrase <dot>triageresponse and select Refer to ADS to document.     Reason for Disposition    Fever present > 3 days (72 hours)    Additional Information    Negative: Difficult to awaken or acting confused (e.g., disoriented, slurred speech)    Negative: Pale cold skin and very weak (can't stand)    Negative: Difficulty breathing and bluish (or gray) lips or face    Negative: New-onset rash with purple (or blood-colored) spots or dots    Negative: Sounds like a life-threatening emergency to the triager    Negative: Fever onset within 24 hours of receiving vaccine    Negative: Fever within 14 days of COVID-19 Exposure    Negative: Pregnant    Negative: Postpartum (from 0 to 6 weeks after delivery)    Negative:  Headache and stiff neck (can't touch chin to chest)    Negative: Difficulty breathing    Negative: IV Drug Use (IVDU)    Negative: Fever > 103 F (39.4 C)    Negative: Fever > 100.0 F (37.8 C) and indwelling urinary catheter (e.g., Bach, coude)    Negative: Fever > 100.4 F (38.0 C) and has port (portacath), central line, or PICC line    Negative: Drinking very little and has signs of dehydration (e.g., no urine > 12 hours, very dry mouth, very lightheaded)    Negative: Patient sounds very sick or weak to the triager    Negative: Fever > 101 F (38.3 C) and over 60 years of age    Negative: Fever > 100.0 F (37.8 C) and diabetes mellitus or a weak immune system (e.g., HIV positive, chemotherapy, splenectomy)    Negative: Fever > 100.0 F (37.8 C) and bedridden (e.g., nursing home patient, stroke, chronic illness, recovering from surgery)    Negative: Fever > 100.4 F (38.0 C) and surgery in the past month    Negative: Transplant patient (e.g., liver, heart, lung, kidney)    Negative: Widespread rash and cause unknown    Negative: Severe chills (i.e., feeling extremely cold WITH shaking chills)    Negative: Patient wants to be seen    Protocols used: FEVER-A-OH    Den Lara RN, BSN, MSN  [facility] Triage 12:16 PM

## 2023-06-10 NOTE — PROGRESS NOTES
"SUBJECTIVE:  Simi Godwin is a 28 year old female here for continued fevers.  She was seen on June 7 for fever.  As of today she has had fevers over 101 degrees for last 7 days.  She is had no sore throat, cough, runny nose, ear pain, nausea, vomiting, diarrhea that is worsened compared to her chronic diarrhea from metformin.  She has had no urinary symptoms.  No recent travel.  She has been taking amoxicillin as prescribed as she tested positive for strep despite having no symptoms consistent with strep pharyngitis.    Allergies:  No Known Allergies    ROS:    As above otherwise ROS is unremarkable.    OBJECTIVE:  /82 (BP Location: Right arm, Patient Position: Sitting, Cuff Size: Adult Large)   Pulse 108   Temp (!) 101.7  F (38.7  C) (Tympanic)   Resp 20   Ht 1.588 m (5' 2.5\")   Wt (!) 158.3 kg (349 lb)   LMP 03/25/2023 (Exact Date)   SpO2 94%   BMI 62.82 kg/m      EXAM:  General Appearance: Pleasant, alert, appropriate appearance for age. No acute distress  Head: Normal. Normocephalic, atraumatic.  Eyes: PERRL, EOMI  Skin: no concerning or new rashes.  Neurologic Exam: CN 2-12 grossly intact.    Psychiatric Exam: Alert and oriented, appropriate affect.    ASSESSEMENT AND PLAN:    1. Fever, unspecified fever cause      Reviewing her labs she has low white count and lower platelets that she has had in the past.  Given the time a year, continuing fevers over the last week and lack improved with amoxicillin suspect she may have a tickborne illness.  We discussed potentially checking labs and discussed the pros and cons including false negatives.  We could certainly repeat white count, platelets and liver enzymes which was declined.  At this time we will switch to doxycycline 100 mg twice daily for 2 weeks for him.  Treatment of likely tickborne illness.  She will follow-up if she has no improvement.    Anuj Coreas MD  Family Medicine  "

## 2023-06-12 ENCOUNTER — HOSPITAL ENCOUNTER (EMERGENCY)
Facility: OTHER | Age: 28
Discharge: HOME | End: 2023-06-13
Attending: FAMILY MEDICINE | Admitting: FAMILY MEDICINE
Payer: COMMERCIAL

## 2023-06-12 ENCOUNTER — APPOINTMENT (OUTPATIENT)
Dept: CT IMAGING | Facility: OTHER | Age: 28
End: 2023-06-12
Attending: FAMILY MEDICINE
Payer: COMMERCIAL

## 2023-06-12 DIAGNOSIS — R50.9 FEVER, UNSPECIFIED FEVER CAUSE: ICD-10-CM

## 2023-06-12 LAB
ALBUMIN SERPL BCG-MCNC: 3.9 G/DL (ref 3.5–5.2)
ALBUMIN UR-MCNC: 20 MG/DL
ALP SERPL-CCNC: 103 U/L (ref 35–104)
ALT SERPL W P-5'-P-CCNC: 115 U/L (ref 10–35)
ANION GAP SERPL CALCULATED.3IONS-SCNC: 11 MMOL/L (ref 7–15)
APPEARANCE UR: CLEAR
AST SERPL W P-5'-P-CCNC: 182 U/L (ref 10–35)
BASOPHILS # BLD AUTO: 0 10E3/UL (ref 0–0.2)
BASOPHILS NFR BLD AUTO: 1 %
BILIRUB SERPL-MCNC: 0.6 MG/DL
BILIRUB UR QL STRIP: NEGATIVE
BUN SERPL-MCNC: 8.8 MG/DL (ref 6–20)
CALCIUM SERPL-MCNC: 9.5 MG/DL (ref 8.6–10)
CHLORIDE SERPL-SCNC: 98 MMOL/L (ref 98–107)
COLOR UR AUTO: YELLOW
CREAT SERPL-MCNC: 0.69 MG/DL (ref 0.51–0.95)
CRP SERPL-MCNC: 70.58 MG/L
D DIMER PPP FEU-MCNC: 2.61 UG/ML FEU (ref 0–0.5)
DEPRECATED HCO3 PLAS-SCNC: 26 MMOL/L (ref 22–29)
EOSINOPHIL # BLD AUTO: 0.2 10E3/UL (ref 0–0.7)
EOSINOPHIL NFR BLD AUTO: 4 %
ERYTHROCYTE [DISTWIDTH] IN BLOOD BY AUTOMATED COUNT: 15.9 % (ref 10–15)
ERYTHROCYTE [SEDIMENTATION RATE] IN BLOOD BY WESTERGREN METHOD: 16 MM/HR (ref 0–20)
GFR SERPL CREATININE-BSD FRML MDRD: >90 ML/MIN/1.73M2
GLUCOSE SERPL-MCNC: 122 MG/DL (ref 70–99)
GLUCOSE UR STRIP-MCNC: NEGATIVE MG/DL
HCG UR QL: NEGATIVE
HCT VFR BLD AUTO: 39.8 % (ref 35–47)
HGB BLD-MCNC: 12.9 G/DL (ref 11.7–15.7)
HGB UR QL STRIP: NEGATIVE
IMM GRANULOCYTES # BLD: 0.2 10E3/UL
IMM GRANULOCYTES NFR BLD: 5 %
KETONES UR STRIP-MCNC: NEGATIVE MG/DL
LACTATE SERPL-SCNC: 1.5 MMOL/L (ref 0.7–2)
LEUKOCYTE ESTERASE UR QL STRIP: NEGATIVE
LYMPHOCYTES # BLD AUTO: 2.1 10E3/UL (ref 0.8–5.3)
LYMPHOCYTES NFR BLD AUTO: 42 %
MCH RBC QN AUTO: 25 PG (ref 26.5–33)
MCHC RBC AUTO-ENTMCNC: 32.4 G/DL (ref 31.5–36.5)
MCV RBC AUTO: 77 FL (ref 78–100)
MONOCYTES # BLD AUTO: 0.2 10E3/UL (ref 0–1.3)
MONOCYTES NFR BLD AUTO: 5 %
MUCOUS THREADS #/AREA URNS LPF: PRESENT /LPF
NEUTROPHILS # BLD AUTO: 2.1 10E3/UL (ref 1.6–8.3)
NEUTROPHILS NFR BLD AUTO: 43 %
NITRATE UR QL: NEGATIVE
NRBC # BLD AUTO: 0 10E3/UL
NRBC BLD AUTO-RTO: 0 /100
PH UR STRIP: 7 [PH] (ref 5–9)
PLATELET # BLD AUTO: 183 10E3/UL (ref 150–450)
POTASSIUM SERPL-SCNC: 3.8 MMOL/L (ref 3.4–5.3)
PROT SERPL-MCNC: 6.9 G/DL (ref 6.4–8.3)
RBC # BLD AUTO: 5.17 10E6/UL (ref 3.8–5.2)
RBC URINE: 1 /HPF
SODIUM SERPL-SCNC: 135 MMOL/L (ref 136–145)
SP GR UR STRIP: 1.02 (ref 1–1.03)
SQUAMOUS EPITHELIAL: 8 /HPF
TROPONIN T SERPL HS-MCNC: <6 NG/L
UROBILINOGEN UR STRIP-MCNC: NORMAL MG/DL
WBC # BLD AUTO: 5 10E3/UL (ref 4–11)
WBC URINE: 4 /HPF

## 2023-06-12 PROCEDURE — 86618 LYME DISEASE ANTIBODY: CPT | Performed by: EMERGENCY MEDICINE

## 2023-06-12 PROCEDURE — 71275 CT ANGIOGRAPHY CHEST: CPT | Mod: TC

## 2023-06-12 PROCEDURE — 85379 FIBRIN DEGRADATION QUANT: CPT | Performed by: FAMILY MEDICINE

## 2023-06-12 PROCEDURE — 84484 ASSAY OF TROPONIN QUANT: CPT | Performed by: FAMILY MEDICINE

## 2023-06-12 PROCEDURE — 36415 COLL VENOUS BLD VENIPUNCTURE: CPT | Performed by: FAMILY MEDICINE

## 2023-06-12 PROCEDURE — 80053 COMPREHEN METABOLIC PANEL: CPT | Performed by: FAMILY MEDICINE

## 2023-06-12 PROCEDURE — 250N000013 HC RX MED GY IP 250 OP 250 PS 637: Performed by: EMERGENCY MEDICINE

## 2023-06-12 PROCEDURE — 83605 ASSAY OF LACTIC ACID: CPT | Performed by: FAMILY MEDICINE

## 2023-06-12 PROCEDURE — 258N000003 HC RX IP 258 OP 636: Performed by: FAMILY MEDICINE

## 2023-06-12 PROCEDURE — 87484 EHRLICHA CHAFFEENSIS AMP PRB: CPT | Performed by: EMERGENCY MEDICINE

## 2023-06-12 PROCEDURE — 99284 EMERGENCY DEPT VISIT MOD MDM: CPT | Performed by: FAMILY MEDICINE

## 2023-06-12 PROCEDURE — 85025 COMPLETE CBC W/AUTO DIFF WBC: CPT | Performed by: FAMILY MEDICINE

## 2023-06-12 PROCEDURE — 93005 ELECTROCARDIOGRAM TRACING: CPT

## 2023-06-12 PROCEDURE — 96360 HYDRATION IV INFUSION INIT: CPT | Mod: XU

## 2023-06-12 PROCEDURE — 99285 EMERGENCY DEPT VISIT HI MDM: CPT | Mod: 25

## 2023-06-12 PROCEDURE — 93010 ELECTROCARDIOGRAM REPORT: CPT | Performed by: INTERNAL MEDICINE

## 2023-06-12 PROCEDURE — 86140 C-REACTIVE PROTEIN: CPT | Performed by: FAMILY MEDICINE

## 2023-06-12 PROCEDURE — 87798 DETECT AGENT NOS DNA AMP: CPT | Performed by: EMERGENCY MEDICINE

## 2023-06-12 PROCEDURE — 85652 RBC SED RATE AUTOMATED: CPT | Performed by: FAMILY MEDICINE

## 2023-06-12 PROCEDURE — 81001 URINALYSIS AUTO W/SCOPE: CPT | Performed by: FAMILY MEDICINE

## 2023-06-12 PROCEDURE — 81025 URINE PREGNANCY TEST: CPT | Performed by: FAMILY MEDICINE

## 2023-06-12 RX ORDER — ACETAMINOPHEN 500 MG
1000 TABLET ORAL ONCE
Status: COMPLETED | OUTPATIENT
Start: 2023-06-12 | End: 2023-06-12

## 2023-06-12 RX ORDER — IOPAMIDOL 755 MG/ML
110 INJECTION, SOLUTION INTRAVASCULAR ONCE
Status: COMPLETED | OUTPATIENT
Start: 2023-06-12 | End: 2023-06-13

## 2023-06-12 RX ORDER — SODIUM CHLORIDE 9 MG/ML
INJECTION, SOLUTION INTRAVENOUS CONTINUOUS
Status: DISCONTINUED | OUTPATIENT
Start: 2023-06-12 | End: 2023-06-13 | Stop reason: HOSPADM

## 2023-06-12 RX ADMIN — ACETAMINOPHEN 1000 MG: 500 TABLET ORAL at 20:47

## 2023-06-12 RX ADMIN — SODIUM CHLORIDE 1000 ML: 9 INJECTION, SOLUTION INTRAVENOUS at 22:36

## 2023-06-12 ASSESSMENT — ENCOUNTER SYMPTOMS
COUGH: 1
CONSTIPATION: 0
SORE THROAT: 0
NAUSEA: 1
FLANK PAIN: 0
NECK PAIN: 0
FATIGUE: 1
BLOOD IN STOOL: 0
DIARRHEA: 1
WEAKNESS: 0
PALPITATIONS: 1
VOMITING: 1
FEVER: 1
NERVOUS/ANXIOUS: 1
ABDOMINAL DISTENTION: 0
SHORTNESS OF BREATH: 1
DYSURIA: 1
RHINORRHEA: 0
HEADACHES: 1
EYES NEGATIVE: 1
ABDOMINAL PAIN: 0
APPETITE CHANGE: 1

## 2023-06-12 ASSESSMENT — ACTIVITIES OF DAILY LIVING (ADL): ADLS_ACUITY_SCORE: 35

## 2023-06-12 NOTE — Clinical Note
Simi Godwin was seen and treated in our emergency department on 6/12/2023.  She may return to work on 06/14/2023.  She should be off of work until she is fever free for at least 24 hours.     If you have any questions or concerns, please don't hesitate to call.      Damion Dixon MD

## 2023-06-13 ENCOUNTER — MYC MEDICAL ADVICE (OUTPATIENT)
Dept: FAMILY MEDICINE | Facility: OTHER | Age: 28
End: 2023-06-13
Payer: COMMERCIAL

## 2023-06-13 VITALS
DIASTOLIC BLOOD PRESSURE: 71 MMHG | HEIGHT: 63 IN | RESPIRATION RATE: 29 BRPM | OXYGEN SATURATION: 95 % | BODY MASS INDEX: 51.91 KG/M2 | WEIGHT: 293 LBS | SYSTOLIC BLOOD PRESSURE: 130 MMHG | TEMPERATURE: 99.5 F | HEART RATE: 104 BPM

## 2023-06-13 DIAGNOSIS — R16.1 SPLENOMEGALY: Primary | ICD-10-CM

## 2023-06-13 LAB
ATRIAL RATE - MUSE: 115 BPM
B BURGDOR IGG+IGM SER QL: 0.43
DIASTOLIC BLOOD PRESSURE - MUSE: NORMAL MMHG
INTERPRETATION ECG - MUSE: NORMAL
P AXIS - MUSE: 74 DEGREES
PR INTERVAL - MUSE: 146 MS
QRS DURATION - MUSE: 96 MS
QT - MUSE: 320 MS
QTC - MUSE: 442 MS
R AXIS - MUSE: 34 DEGREES
SYSTOLIC BLOOD PRESSURE - MUSE: NORMAL MMHG
T AXIS - MUSE: 48 DEGREES
VENTRICULAR RATE- MUSE: 115 BPM

## 2023-06-13 PROCEDURE — 96361 HYDRATE IV INFUSION ADD-ON: CPT

## 2023-06-13 PROCEDURE — 250N000011 HC RX IP 250 OP 636: Performed by: FAMILY MEDICINE

## 2023-06-13 PROCEDURE — 258N000003 HC RX IP 258 OP 636: Performed by: FAMILY MEDICINE

## 2023-06-13 RX ORDER — ONDANSETRON 4 MG/1
4 TABLET, ORALLY DISINTEGRATING ORAL EVERY 8 HOURS PRN
Qty: 15 TABLET | Refills: 0 | Status: SHIPPED | OUTPATIENT
Start: 2023-06-13 | End: 2023-06-18

## 2023-06-13 RX ADMIN — IOPAMIDOL 110 ML: 755 INJECTION, SOLUTION INTRAVENOUS at 00:05

## 2023-06-13 RX ADMIN — SODIUM CHLORIDE: 9 INJECTION, SOLUTION INTRAVENOUS at 00:06

## 2023-06-13 ASSESSMENT — ACTIVITIES OF DAILY LIVING (ADL): ADLS_ACUITY_SCORE: 35

## 2023-06-13 NOTE — DISCHARGE INSTRUCTIONS
You can take 800 mg of ibuprofen every 6 hours, with some food.  You can take 1000 mg of Tylenol every 6 hours.  You can alternate these so that you have one of them every 3 hours.  I will send out studies for tickborne diseases, and we will contact you if any of these are positive.  In the meantime drink plenty of fluids, return here if worse.

## 2023-06-13 NOTE — ED TRIAGE NOTES
"Patient was in the clinic on Saturday for ongoing fevers at home.  ( originally dx of strep Wednesday and started on amoxicillin) Patient states this is day 10 of high fevers and they thought she might have lyme's on Saturday and was started on doxy.  She continues to have fevers and states she cant keep things down.  Patient last had motrin at 1900 ( 200 mg).BP (!) 145/81   Pulse (!) 130   Temp (!) 102.5  F (39.2  C) (Tympanic)   Resp 18   Ht 1.588 m (5' 2.5\")   Wt (!) 158.3 kg (349 lb)   LMP 03/25/2023 (Exact Date)   SpO2 96%   BMI 62.82 kg/m         Triage Assessment     Row Name 06/12/23 2037       Triage Assessment (Adult)    Airway WDL WDL       Respiratory WDL    Respiratory WDL WDL       Skin Circulation/Temperature WDL    Skin Circulation/Temperature WDL X;temperature    Skin Temperature warm       Cardiac WDL    Cardiac WDL WDL       Peripheral/Neurovascular WDL    Peripheral Neurovascular WDL WDL       Cognitive/Neuro/Behavioral WDL    Cognitive/Neuro/Behavioral WDL WDL              "

## 2023-06-13 NOTE — ED PROVIDER NOTES
History     Chief Complaint   Patient presents with     Fever     Dehydration     HPI  Simi Godwin is a 28 year old female who became ill with fever and malaise on 6/2/23.  She thought she might have some sinus congestion at the time was having some retro-ocular headache but did not think much of it.  However, her symptoms persisted and she was evaluated in the family practice clinic on 6/7/2023 with no specific symptoms and received a viral respiratory evaluation panel as well as strep screen and her strep screen was positive even though she reported no sore throat.  She was started empirically on amoxicillin and continue this however by this past Saturday, 6/10/2023 she was feeling no better and possibly worse.  She was seen again on 6/10/2023 in rapid clinic and again had no obvious focus of infection but in review with patient about concerns for summertime fever given our risks for tickborne illnesses her antibiotic was switched to doxycycline.  She continues on doxycycline but continues to have fevers to 102.5 every day.  She is having some nausea and occasional vomiting with taking the doxycycline.  Her malaise is persisted to increased, and she does not feel like she is keeping up with her oral fluid intake needs.  She does have type 2 diabetes on metformin and has some chronic intermittent loose stools.  Today she has been increasingly short of breath with minimal activity.  She has a strong family history of VTE.  History of SVT status post ablation and continues to have intermittent PVCs.    Allergies:  No Known Allergies    Problem List:    Patient Active Problem List    Diagnosis Date Noted     Diabetes mellitus, type 2 (H) 12/15/2022     Priority: Medium     History of cardiac radiofrequency ablation for SVT 04/28/2020     Priority: Medium     H/O supraventricular tachycardia 04/28/2020     Priority: Medium     Palpitations 04/28/2020     Priority: Medium     Hypertension affecting pregnancy in  third trimester 2019     Priority: Medium     S/P  section 2019     Priority: Medium     Encounter for triage in pregnant patient 2019     Priority: Medium     History of  2018     Priority: Medium     Nicotine use disorder 2018     Priority: Medium     Failure to progress in labor 2016     Priority: Medium     BMI 50.0-59.9, adult (H) 2016     Priority: Medium     Obesity affecting pregnancy 2016     Priority: Medium        Past Medical History:    Past Medical History:   Diagnosis Date     Obesity complicating pregnancy 2016     Polyhydramnios      Supraventricular tachycardia (H)        Past Surgical History:    Past Surgical History:   Procedure Laterality Date      SECTION N/A 2019    Procedure:  SECTION;  Surgeon: Tomer Pereira MD;  Location: [facility]     [facility] OR CATH ABLATION NON-CARDIAC ENDOVASCULAR NTAP      AVNRT     RELEASE CARPAL TUNNEL Bilateral      TONSILLECTOMY         Family History:    Family History   Problem Relation Age of Onset     Coronary Artery Disease Father 59     Thrombosis Father      Heart Failure Brother 30     Diabetes Maternal Grandfather      Cancer Paternal Grandfather      Breast Cancer No family hx of      Colon Cancer No family hx of        Social History:  Marital Status:  Single [1]  Social History     Tobacco Use     Smoking status: Former     Packs/day: 0.25     Years: 2.00     Pack years: 0.50     Types: Vaping Device, Cigarettes     Quit date: 2019     Years since quittin.4     Smokeless tobacco: Never     Tobacco comments:     1-3 a day   Vaping Use     Vaping status: Every Day     Substances: Nicotine, Flavoring     Devices: Pre-filled or refillable cartridge   Substance Use Topics     Alcohol use: Yes     Alcohol/week: 0.0 standard drinks of alcohol     Comment: occasional -1-2 drinks a week     Drug use: No     Comment: Drug use: No        Medications:   "  amoxicillin (AMOXIL) 500 MG capsule  blood glucose (NO BRAND SPECIFIED) lancets standard  blood glucose (NO BRAND SPECIFIED) test strip  blood glucose monitoring (NO BRAND SPECIFIED) meter device kit  doxycycline hyclate (VIBRA-TABS) 100 MG tablet  metFORMIN (GLUCOPHAGE) 500 MG tablet  omeprazole (PRILOSEC) 20 MG DR capsule  semaglutide (OZEMPIC) 2 MG/1.5ML SOPN pen  sertraline (ZOLOFT) 50 MG tablet          Review of Systems   Constitutional: Positive for appetite change, fatigue and fever.   HENT: Negative for congestion, ear pain, rhinorrhea and sore throat.    Eyes: Negative.    Respiratory: Positive for cough (Rare dry cough.) and shortness of breath.    Cardiovascular: Positive for palpitations. Negative for chest pain and leg swelling.   Gastrointestinal: Positive for diarrhea (Chronic intermittent loose stools.  She feels this is related to her metformin use.), nausea and vomiting. Negative for abdominal distention, abdominal pain, blood in stool and constipation.   Genitourinary: Positive for dysuria (Today she has noticed some dysuria.). Negative for flank pain and vaginal discharge (Denies any concerns about STIs).   Musculoskeletal: Negative for neck pain.   Skin: Negative for rash.   Neurological: Positive for headaches. Negative for weakness.   Psychiatric/Behavioral: The patient is nervous/anxious.        Physical Exam   BP: (!) 145/81  Pulse: (!) 130  Temp: (!) 102.5  F (39.2  C)  Resp: 18  Height: 158.8 cm (5' 2.5\")  Weight: (!) 158.3 kg (349 lb)  SpO2: 96 %      Physical Exam  Constitutional:       Appearance: She is obese. She is ill-appearing and diaphoretic. She is not toxic-appearing.   HENT:      Head: Normocephalic and atraumatic.      Right Ear: Tympanic membrane, ear canal and external ear normal.      Left Ear: Tympanic membrane, ear canal and external ear normal.      Nose: Nose normal. No rhinorrhea.      Mouth/Throat:      Mouth: Mucous membranes are moist.      Pharynx: No " oropharyngeal exudate or posterior oropharyngeal erythema.   Eyes:      General: No scleral icterus.     Extraocular Movements: Extraocular movements intact.      Conjunctiva/sclera: Conjunctivae normal.      Pupils: Pupils are equal, round, and reactive to light.   Cardiovascular:      Rate and Rhythm: Regular rhythm. Tachycardia present.      Heart sounds: Normal heart sounds.   Pulmonary:      Effort: Pulmonary effort is normal.      Breath sounds: Normal breath sounds.   Abdominal:      General: Bowel sounds are normal. There is no distension.      Palpations: Abdomen is soft.      Tenderness: There is no abdominal tenderness. There is no guarding or rebound.   Musculoskeletal:         General: No tenderness. Normal range of motion.      Cervical back: Normal range of motion and neck supple.   Skin:     General: Skin is warm.      Findings: No rash.      Comments: No splinter hemorrhages noted in her fingers.  Toenails are painted pink and unable to evaluate.   Neurological:      General: No focal deficit present.      Mental Status: She is alert.   Psychiatric:      Comments: Anxious.         ED Course                 Procedures              EKG Interpretation:      Interpreted by Neal Kenney MD  Time reviewed: 22:06  Symptoms at time of EKG: COLLINS, fever   Rhythm: sinus tachycardia   Rate: 115bpm  Axis: normal  Ectopy: none  Conduction: normal  ST Segments/ T Waves: No ST-T wave changes  Q Waves: none  Comparison to prior: compared to 4/2/2020 rate has increased from 88bpm and o/w similar.    Clinical Impression: abnormal EKG          Critical Care time:  none               Results for orders placed or performed during the hospital encounter of 06/12/23 (from the past 24 hour(s))   CBC with platelets differential    Narrative    The following orders were created for panel order CBC with platelets differential.  Procedure                               Abnormality         Status                      ---------                               -----------         ------                     CBC with platelets and d...[[id number]]  Abnormal            Final result                 Please view results for these tests on the individual orders.   Comprehensive metabolic panel   Result Value Ref Range    Sodium 135 (L) 136 - 145 mmol/L    Potassium 3.8 3.4 - 5.3 mmol/L    Chloride 98 98 - 107 mmol/L    Carbon Dioxide (CO2) 26 22 - 29 mmol/L    Anion Gap 11 7 - 15 mmol/L    Urea Nitrogen 8.8 6.0 - 20.0 mg/dL    Creatinine 0.69 0.51 - 0.95 mg/dL    Calcium 9.5 8.6 - 10.0 mg/dL    Glucose 122 (H) 70 - 99 mg/dL    Alkaline Phosphatase 103 35 - 104 U/L     (H) 10 - 35 U/L     (H) 10 - 35 U/L    Protein Total 6.9 6.4 - 8.3 g/dL    Albumin 3.9 3.5 - 5.2 g/dL    Bilirubin Total 0.6 <=1.2 mg/dL    GFR Estimate >90 >60 mL/min/1.73m2   Troponin T, High Sensitivity   Result Value Ref Range    Troponin T, High Sensitivity <6 <=14 ng/L   D dimer quantitative   Result Value Ref Range    D-Dimer Quantitative 2.61 (H) 0.00 - 0.50 ug/mL FEU    Narrative    This D-dimer assay is intended for use in conjunction with a clinical pretest probability assessment model to exclude pulmonary embolism (PE) and deep venous thrombosis (DVT) in outpatients suspected of PE or DVT. The cut-off value is 0.50 ug/mL FEU.   Lactic acid whole blood   Result Value Ref Range    Lactic Acid 1.5 0.7 - 2.0 mmol/L   Erythrocyte sedimentation rate auto   Result Value Ref Range    Erythrocyte Sedimentation Rate 16 0 - 20 mm/hr   CRP inflammation   Result Value Ref Range    CRP Inflammation 70.58 (H) <5.00 mg/L   UA with Microscopic reflex to Culture    Specimen: Urine, Midstream   Result Value Ref Range    Color Urine Yellow Colorless, Straw, Light Yellow, Yellow    Appearance Urine Clear Clear    Glucose Urine Negative Negative mg/dL    Bilirubin Urine Negative Negative    Ketones Urine Negative Negative mg/dL    Specific Gravity Urine 1.025 1.000 -  1.030    Blood Urine Negative Negative    pH Urine 7.0 5.0 - 9.0    Protein Albumin Urine 20 (A) Negative mg/dL    Urobilinogen Urine Normal Normal, 2.0 mg/dL    Nitrite Urine Negative Negative    Leukocyte Esterase Urine Negative Negative    Mucus Urine Present (A) None Seen /LPF    RBC Urine 1 <=2 /HPF    WBC Urine 4 <=5 /HPF    Squamous Epithelials Urine 8 (H) <=1 /HPF    Narrative    Urine Culture not indicated   CBC with platelets and differential   Result Value Ref Range    WBC Count 5.0 4.0 - 11.0 10e3/uL    RBC Count 5.17 3.80 - 5.20 10e6/uL    Hemoglobin 12.9 11.7 - 15.7 g/dL    Hematocrit 39.8 35.0 - 47.0 %    MCV 77 (L) 78 - 100 fL    MCH 25.0 (L) 26.5 - 33.0 pg    MCHC 32.4 31.5 - 36.5 g/dL    RDW 15.9 (H) 10.0 - 15.0 %    Platelet Count 183 150 - 450 10e3/uL    % Neutrophils 43 %    % Lymphocytes 42 %    % Monocytes 5 %    % Eosinophils 4 %    % Basophils 1 %    % Immature Granulocytes 5 %    NRBCs per 100 WBC 0 <1 /100    Absolute Neutrophils 2.1 1.6 - 8.3 10e3/uL    Absolute Lymphocytes 2.1 0.8 - 5.3 10e3/uL    Absolute Monocytes 0.2 0.0 - 1.3 10e3/uL    Absolute Eosinophils 0.2 0.0 - 0.7 10e3/uL    Absolute Basophils 0.0 0.0 - 0.2 10e3/uL    Absolute Immature Granulocytes 0.2 <=0.4 10e3/uL    Absolute NRBCs 0.0 10e3/uL   HCG qualitative urine (UPT)   Result Value Ref Range    hCG Urine Qualitative Negative Negative       Medications   0.9% sodium chloride BOLUS (0 mLs Intravenous Stopped 6/12/23 2342)     Followed by   sodium chloride 0.9% infusion ( Intravenous $New Bag 6/13/23 0006)   acetaminophen (TYLENOL) tablet 1,000 mg (1,000 mg Oral $Given 6/12/23 2047)   iopamidol (ISOVUE-370) solution 110 mL (110 mLs Intravenous $Given 6/13/23 0005)     10:10pm I reviewed concerns for r/o PE given patient's resting tachycardia and her new SOB/COLLINS today.  D-dimer ordered and patient and  agree.    11:13 PM D-dimer has come back elevated.  Patient has a long history of irregular menses and last  menstrual period was the end of March and she does not believe she is pregnant but will get a urine pregnancy test prior to CT scan for PE rule out.    12:17 AM scan results pending.  I will sign out patient to Dr. Dixon for CT results and discussion of disposition.  Neal Kenney MD     Assessments & Plan (with Medical Decision Making)   28-year-old female with 10 days of daily fevers to 103 without obvious focus of infection on exam and new shortness of breath/dyspnea on exertion today with resting tachycardia.  On empiric treatment for tick borne illness and recent positive strep screen.    I have reviewed the nursing notes.    I have reviewed the findings, diagnosis, plan and need for follow up with the patient.           Medical Decision Making  The patient's presentation was of moderate complexity (a chronic illness mild to moderate exacerbation, progression, or side effect of treatment).    The patient's evaluation involved:  ordering and/or review of 3+ test(s) in this encounter (see separate area of note for details)    The patient's management necessitated moderate risk (prescription drug management including medications given in the ED).        New Prescriptions    No medications on file       Final diagnoses:   Fever, unspecified fever cause       6/12/2023   [facility]     Neal Kenney MD  06/13/23 0021

## 2023-06-15 ENCOUNTER — LAB (OUTPATIENT)
Dept: LAB | Facility: OTHER | Age: 28
End: 2023-06-15
Attending: FAMILY MEDICINE
Payer: COMMERCIAL

## 2023-06-15 DIAGNOSIS — R16.1 SPLENOMEGALY: ICD-10-CM

## 2023-06-15 LAB — MONOCYTES NFR BLD AUTO: NEGATIVE %

## 2023-06-15 PROCEDURE — 36415 COLL VENOUS BLD VENIPUNCTURE: CPT | Mod: ZL

## 2023-06-15 PROCEDURE — 86308 HETEROPHILE ANTIBODY SCREEN: CPT | Mod: ZL

## 2023-06-16 LAB
A PHAGOCYTOPH DNA BLD QL NAA+PROBE: NOT DETECTED
B MICROTI DNA BLD QL NAA+PROBE: NOT DETECTED
BABESIA DNA BLD QL NAA+PROBE: NOT DETECTED
E CHAFFEENSIS DNA BLD QL NAA+PROBE: NOT DETECTED
E EWINGII DNA SPEC QL NAA+PROBE: NOT DETECTED
EHRLICHIA DNA SPEC QL NAA+PROBE: NOT DETECTED

## 2023-06-20 NOTE — TELEPHONE ENCOUNTER
Patient to schedule follow-up.  As I never actually seen her in person and evaluated her for this illness, it is difficult for me to make recommendations    Jim Joya MD

## 2023-08-15 DIAGNOSIS — E66.01 MORBID OBESITY (H): ICD-10-CM

## 2023-08-17 NOTE — TELEPHONE ENCOUNTER
[facility] sent Rx request for the following:      Requested Prescriptions   Pending Prescriptions Disp Refills    metFORMIN (GLUCOPHAGE) 500 MG tablet [Pharmacy Med Name: METFORMIN 500MG TABLETS] 90 tablet 1     Sig: TAKE 1 TABLET(500 MG) BY MOUTH TWICE DAILY WITH MEALS       Biguanide Agents Failed - 8/17/2023 10:41 AM        Failed - Patient has documented A1c within the specified period of time.     If HgbA1C is 8 or greater, it needs to be on file within the past 3 months.  If less than 8, must be on file within the past 6 months.     Recent Labs   Lab Test 12/09/22  1111 09/06/16 2108   A1C 6.6*  --    [id number]  --  5.4        Last Prescription Date:   3/14/23  Last Fill Qty/Refills:         90, R-1    Last Office Visit:              2/9/23   Future Office visit:           None    Per LOV note:  Follow-up in 2 months with repeat A1c.     Pt due for follow up exam. Routing to provider for refill consideration. Routing to Unit scheduling pool, to assist Pt in scheduling appointment.     Miguelina Newman RN .............. 8/17/2023  10:45 AM

## 2023-10-07 ENCOUNTER — HEALTH MAINTENANCE LETTER (OUTPATIENT)
Age: 28
End: 2023-10-07

## 2023-11-17 ENCOUNTER — OFFICE VISIT (OUTPATIENT)
Dept: FAMILY MEDICINE | Facility: OTHER | Age: 28
End: 2023-11-17
Payer: COMMERCIAL

## 2023-11-17 VITALS
DIASTOLIC BLOOD PRESSURE: 86 MMHG | BODY MASS INDEX: 53.92 KG/M2 | HEIGHT: 62 IN | HEART RATE: 94 BPM | RESPIRATION RATE: 18 BRPM | SYSTOLIC BLOOD PRESSURE: 132 MMHG | OXYGEN SATURATION: 97 % | TEMPERATURE: 99.9 F | WEIGHT: 293 LBS

## 2023-11-17 DIAGNOSIS — J02.9 SORE THROAT: ICD-10-CM

## 2023-11-17 DIAGNOSIS — U07.1 INFECTION DUE TO 2019 NOVEL CORONAVIRUS: Primary | ICD-10-CM

## 2023-11-17 LAB
FLUAV RNA SPEC QL NAA+PROBE: NEGATIVE
FLUBV RNA RESP QL NAA+PROBE: NEGATIVE
GROUP A STREP BY PCR: NOT DETECTED
RSV RNA SPEC NAA+PROBE: NEGATIVE
SARS-COV-2 RNA RESP QL NAA+PROBE: POSITIVE

## 2023-11-17 PROCEDURE — G0463 HOSPITAL OUTPT CLINIC VISIT: HCPCS

## 2023-11-17 PROCEDURE — C9803 HOPD COVID-19 SPEC COLLECT: HCPCS

## 2023-11-17 PROCEDURE — 87651 STREP A DNA AMP PROBE: CPT | Mod: ZL

## 2023-11-17 PROCEDURE — 99213 OFFICE O/P EST LOW 20 MIN: CPT

## 2023-11-17 PROCEDURE — 87637 SARSCOV2&INF A&B&RSV AMP PRB: CPT | Mod: ZL

## 2023-11-17 ASSESSMENT — PAIN SCALES - GENERAL: PAINLEVEL: MODERATE PAIN (5)

## 2023-11-17 NOTE — LETTER
November 17, 2023      Simi Godwin  [address] [place] [id number]        To Whom It May Concern:    Simi Godwin  was seen on 11/17/23.  Please excuse her until 11/22/23 due to illness.        Sincerely,        VERÓNICA Conteh CNP

## 2023-11-17 NOTE — PROGRESS NOTES
ASSESSMENT/PLAN:    I have reviewed the nursing notes.  I have reviewed the findings, diagnosis, plan and need for follow up with the patient.    1. Infection due to 2019 novel coronavirus  2. Sore throat  - Group A Streptococcus PCR Throat Swab  - Symptomatic Influenza A/B, RSV, & SARS-CoV2 PCR (COVID-19) Nose  - nirmatrelvir and ritonavir (PAXLOVID) 300 mg/100 mg therapy pack; Take 3 tablets by mouth 2 times daily for 5 days  Dispense: 30 tablet; Refill: 0    Patient presents with upper respiratory symptoms.  Patient's vitals are stable and she appears nontoxic.  Patient tested positive for COVID.  Will treat with Paxlovid.  Reviewed patient's kidney function which is normal.  Also reviewed potential of drug to drug interactions and there are no interactions between patient's current medications and the Paxlovid. Discussed symptomatic treatment - Encouraged fluids, salt water gargles, honey (only if greater than 1 year in age due to risk of botulism), elevation, humidifier, sinus rinse/netti pot, lozenges, tea, topical vapor rub, popsicles, rest, etc. May use over-the-counter Tylenol or ibuprofen PRN.  Advised patient that she should quarantine for 5 days from symptom onset and then be fever free for 24 hours.    Discussed warning signs/symptoms indicative of need to f/u    Follow up if symptoms persist or worsen or concerns    I explained my diagnostic considerations and recommendations to the patient, who voiced understanding and agreement with the treatment plan. All questions were answered. We discussed potential side effects of any prescribed or recommended therapies, as well as expectations for response to treatments.    Aidan Wen, APRN CNP  11/17/2023  1:47 PM    HPI:    Simi Godwin is a 28 year old female  who presents to [facility] today for concerns of URI symptoms    URI, x 1 day    Symptoms:  YES: +  fevers or chills. Fever, highest reported temperature: 101.2 F  YES: +  sore  throat/pharyngitis/tonsillitis.   YES: +  allergy/URI Symptoms  YES: +  muffled sounds/change in hearing  YES: +  sensation of fullness in ear(s)  No ringing in ears/tinnitus  No balance changes  No dizziness  YES: +  congestion (head/nasal/chest)  No cough/productive cough  YES: +  post nasal drip   YES: +  headache  YES: +  sinus pain/pressure  YES: +  myalgias  No otalgia  No rash  Activity Level Changes: Yes: increased fatigue  Appetite/Liquid Intake Changes: Yes: decreased  Changes to Bowel Habits: No  Changes to Bladder Habits: No  Additional Symptoms to Report: No  History of similar symptoms: No  Prior workup: No    Treatments tried: Tylenol/Ibuprofen, Fluids, and Rest    Site of exposure: workplace  Type of exposure: colds and strep throat    Other Pertinent History: diabetes, tobacco use, and s/p tonsillectomy    Allergies: NKA    PCP: [facility]    Past Medical History:   Diagnosis Date    Obesity complicating pregnancy 2016    Polyhydramnios     Supraventricular tachycardia      Past Surgical History:   Procedure Laterality Date     SECTION N/A 2019    Procedure:  SECTION;  Surgeon: Tomer Pereira MD;  Location:  OR    [facility] OR CATH ABLATION NON-CARDIAC ENDOVASCULAR NTAP      AVNRT    RELEASE CARPAL TUNNEL Bilateral     TONSILLECTOMY       Social History     Tobacco Use    Smoking status: Former     Packs/day: 0.25     Years: 2.00     Additional pack years: 0.00     Total pack years: 0.50     Types: Vaping Device, Cigarettes     Quit date: 2019     Years since quittin.8    Smokeless tobacco: Never    Tobacco comments:     1-3 a day   Substance Use Topics    Alcohol use: Yes     Alcohol/week: 0.0 standard drinks of alcohol     Comment: occasional -1-2 drinks a week     Current Outpatient Medications   Medication Sig Dispense Refill    blood glucose (NO BRAND SPECIFIED) lancets standard Use to test blood sugar 1 time daily. 100 lancet 3    blood glucose (NO  "BRAND SPECIFIED) test strip Use to test blood sugar 1 time daily. 100 strip 3    blood glucose monitoring (NO BRAND SPECIFIED) meter device kit Use to test blood sugar 1 times daily. 1 kit 0    metFORMIN (GLUCOPHAGE) 500 MG tablet TAKE 1 TABLET(500 MG) BY MOUTH TWICE DAILY WITH MEALS 180 tablet 0    omeprazole (PRILOSEC) 20 MG DR capsule Take 20 mg by mouth      sertraline (ZOLOFT) 50 MG tablet Take 1 tablet (50 mg) by mouth daily 90 tablet 3    semaglutide (OZEMPIC) 2 MG/1.5ML SOPN pen Inject 0.25 mg Subcutaneous every 7 days (Patient not taking: Reported on 6/10/2023) 1.5 mL 1     No Known Allergies  Past medical history, past surgical history, current medications and allergies reviewed and accurate to the best of my knowledge.      ROS:  Refer to HPI    /86   Pulse 94   Temp 99.9  F (37.7  C) (Tympanic)   Resp 18   Ht 1.562 m (5' 1.5\")   Wt 148.8 kg (328 lb 1.6 oz)   SpO2 97%   Breastfeeding No   BMI 60.99 kg/m      EXAM:  General Appearance: Well appearing 28 year old female, appropriate appearance for age. No acute distress   Ears: Left TM intact, translucent with bony landmarks appreciated, no erythema, no effusion, no bulging, no purulence.  Right TM intact, translucent with bony landmarks appreciated, no erythema, no effusion, no bulging, no purulence.  Left auditory canal clear.  Right auditory canal clear.  Normal external ears, non tender.  Eyes: conjunctivae normal without erythema or irritation, corneas clear, no drainage or crusting, no eyelid swelling, pupils equal   Oropharynx: moist mucous membranes, posterior pharynx with erythema, tonsils absent, no post nasal drip seen, no trismus, voice clear.    Sinuses:  No sinus tenderness upon palpation of the frontal or maxillary sinuses  Nose:  Bilateral nares: no erythema, no edema, no drainage or congestion   Neck: supple without adenopathy  Respiratory: normal chest wall and respirations.  Normal effort.  Clear to auscultation bilaterally, " no wheezing, crackles or rhonchi.  No increased work of breathing.  No cough appreciated.  Cardiac: RRR with no murmurs  Musculoskeletal:  Equal movement of bilateral upper extremities.  Equal movement of bilateral lower extremities.  Normal gait.    Dermatological: no rashes noted of exposed skin  Neuro: Alert and oriented to person, place, and time.    Psychological: normal affect, alert, oriented, and pleasant.     Labs:  Results for orders placed or performed in visit on 11/17/23   Symptomatic Influenza A/B, RSV, & SARS-CoV2 PCR (COVID-19) Nose     Status: Abnormal    Specimen: Nose; Swab   Result Value Ref Range    Influenza A PCR Negative Negative    Influenza B PCR Negative Negative    RSV PCR Negative Negative    SARS CoV2 PCR Positive (A) Negative    Narrative    Testing was performed using the Xpert Xpress CoV2/Flu/RSV Assay on the Cepheid GeneXpert Instrument. This test should be ordered for the detection of SARS-CoV-2, influenza, and RSV viruses in individuals who meet clinical and/or epidemiological criteria. Test performance is unknown in asymptomatic patients. This test is for in vitro diagnostic use under the FDA EUA for laboratories certified under CLIA to perform high or moderate complexity testing. This test has not been FDA cleared or approved. A negative result does not rule out the presence of PCR inhibitors in the specimen or target RNA in concentration below the limit of detection for the assay. If only one viral target is positive but coinfection with multiple targets is suspected, the sample should be re-tested with another FDA cleared, approved, or authorized test, if coinfection would change clinical management. This test was validated by the [facility]. These laboratories are certified under the Clinical Laboratory Improvement Amendments of 1988 (CLIA-88) as qualified to perform high complexity laboratory testing.   Group A Streptococcus PCR Throat Swab     Status:  Normal    Specimen: Throat; Swab   Result Value Ref Range    Group A strep by PCR Not Detected Not Detected    Narrative    The Xpert Xpress Strep A test, performed on the [employer]  Instrument Systems, is a rapid, qualitative in vitro diagnostic test for the detection of Streptococcus pyogenes (Group A ß-hemolytic Streptococcus, Strep A) in throat swab specimens from patients with signs and symptoms of pharyngitis. The Xpert Xpress Strep A test can be used as an aid in the diagnosis of Group A Streptococcal pharyngitis. The assay is not intended to monitor treatment for Group A Streptococcus infections. The Xpert Xpress Strep A test utilizes an automated real-time polymerase chain reaction (PCR) to detect Streptococcus pyogenes DNA.

## 2023-11-17 NOTE — NURSING NOTE
"Chief Complaint   Patient presents with    Chills    Fever    Pharyngitis     Symptoms started yesterday; no covid test; taking Tylenol and Ibuprofen        Initial /86   Pulse 94   Temp 99.9  F (37.7  C) (Tympanic)   Resp 18   Ht 1.562 m (5' 1.5\")   Wt 148.8 kg (328 lb 1.6 oz)   SpO2 97%   Breastfeeding No   BMI 60.99 kg/m   Estimated body mass index is 60.99 kg/m  as calculated from the following:    Height as of this encounter: 1.562 m (5' 1.5\").    Weight as of this encounter: 148.8 kg (328 lb 1.6 oz).  Medication Review: complete    The next two questions are to help us understand your food security.  If you are feeling you need any assistance in this area, we have resources available to support you today.          11/17/2023   SDOH- Food Insecurity   Within the past 12 months, did you worry that your food would run out before you got money to buy more? N   Within the past 12 months, did the food you bought just not last and you didn t have money to get more? N         Health Care Directive:  Patient does not have a Health Care Directive or Living Will: Discussed advance care planning with patient; however, patient declined at this time.    Claire Ruby CMA        "

## 2023-11-17 NOTE — LETTER
November 17, 2023      Simi Godwin  [address] [place] [id number]        To Whom It May Concern:    Simi Godwin  was seen on 11/17/23.  Please excuse her  until 11/18/23 due to illness.        Sincerely,        VERÓNICA Conteh CNP

## 2023-12-08 DIAGNOSIS — E66.01 MORBID OBESITY (H): ICD-10-CM

## 2023-12-12 ENCOUNTER — MYC REFILL (OUTPATIENT)
Dept: FAMILY MEDICINE | Facility: OTHER | Age: 28
End: 2023-12-12
Payer: COMMERCIAL

## 2023-12-12 DIAGNOSIS — E66.01 MORBID OBESITY (H): ICD-10-CM

## 2023-12-12 NOTE — TELEPHONE ENCOUNTER
[facility] sent Rx request for the following:      Requested Prescriptions   Pending Prescriptions Disp Refills    metFORMIN (GLUCOPHAGE) 500 MG tablet [Pharmacy Med Name: METFORMIN 500MG TABLETS] 180 tablet 0     Sig: TAKE 1 TABLET(500 MG) BY MOUTH TWICE DAILY WITH MEALS       Biguanide Agents Failed - 12/12/2023  3:53 PM        Failed - Patient has documented A1c within the specified period of time.     If HgbA1C is 8 or greater, it needs to be on file within the past 3 months.  If less than 8, must be on file within the past 6 months.     Recent Labs   Lab Test 12/09/22  1111 09/06/16 2108   A1C 6.6*  --    [id number]  --  5.4            Last Prescription Date:   8/17/23  Last Fill Qty/Refills:         180, R-0    Last Office Visit:              2/9/23   Future Office visit:           1/16/24    Called and spoke with patient and patient states due to stocking issues with Ozempic patient decided to continue with Metformin.  Patient states for now it is working for her and she will follow up with Dr. Joya on 1/16/24.    Prescription approved per [facility] Refill Protocol.    Jennifer Arcos RN on 12/12/2023 at 4:04 PM

## 2023-12-14 NOTE — TELEPHONE ENCOUNTER
Patient sent Rx request for the following:      Requested Prescriptions   Pending Prescriptions Disp Refills    metFORMIN (GLUCOPHAGE) 500 MG tablet 180 tablet 0     Sig: Take 1 tablet (500 mg) by mouth 2 times daily (with meals)       Biguanide Agents Failed - 12/12/2023  7:09 AM     Last Prescription Date:   12/12/23  Last Fill Qty/Refills:         180, R-0    Last Office Visit:              2/9/23   Future Office visit:           1/16/24  Redundant refill request refused: Too soon:  Brittny Brooks RN on 12/14/2023 at 2:40 PM

## 2023-12-19 ENCOUNTER — MYC MEDICAL ADVICE (OUTPATIENT)
Dept: FAMILY MEDICINE | Facility: OTHER | Age: 28
End: 2023-12-19
Payer: COMMERCIAL

## 2023-12-20 NOTE — TELEPHONE ENCOUNTER
12/9/2022  Metformin started 500mg BID for obesity.      Has since been changed to 500mg daily (I believe in August of 23).    LOV 2/9/2023 with [facility].      Continued to have low blood sugars on 500mg once daily and has stopped it as of Sunday.     Will forward for PCP to review upon return to clinic.      Marcia Downs RN on 12/20/2023 at 9:51 AM

## 2023-12-21 NOTE — TELEPHONE ENCOUNTER
She should stay off the metformin for now.  She needs a follow-up appointment as have not seen her 10 months.  No recent weight or labs.    Jim Joya MD

## 2023-12-28 DIAGNOSIS — F41.1 GAD (GENERALIZED ANXIETY DISORDER): ICD-10-CM

## 2024-01-09 ASSESSMENT — ENCOUNTER SYMPTOMS
CONSTIPATION: 0
HEADACHES: 0
BREAST MASS: 0
FREQUENCY: 0
PARESTHESIAS: 0
HEARTBURN: 0
SHORTNESS OF BREATH: 0
PALPITATIONS: 0
ARTHRALGIAS: 0
NERVOUS/ANXIOUS: 1
MYALGIAS: 0
DIARRHEA: 0
ABDOMINAL PAIN: 0
COUGH: 0
HEMATURIA: 0
WEAKNESS: 0
HEMATOCHEZIA: 0
EYE PAIN: 0
DYSURIA: 0
CHILLS: 0
DIZZINESS: 0
SORE THROAT: 0
NAUSEA: 0
FEVER: 0
JOINT SWELLING: 0

## 2024-01-09 ASSESSMENT — ANXIETY QUESTIONNAIRES
IF YOU CHECKED OFF ANY PROBLEMS ON THIS QUESTIONNAIRE, HOW DIFFICULT HAVE THESE PROBLEMS MADE IT FOR YOU TO DO YOUR WORK, TAKE CARE OF THINGS AT HOME, OR GET ALONG WITH OTHER PEOPLE: NOT DIFFICULT AT ALL
6. BECOMING EASILY ANNOYED OR IRRITABLE: SEVERAL DAYS
2. NOT BEING ABLE TO STOP OR CONTROL WORRYING: NOT AT ALL
GAD7 TOTAL SCORE: 7
8. IF YOU CHECKED OFF ANY PROBLEMS, HOW DIFFICULT HAVE THESE MADE IT FOR YOU TO DO YOUR WORK, TAKE CARE OF THINGS AT HOME, OR GET ALONG WITH OTHER PEOPLE?: NOT DIFFICULT AT ALL
3. WORRYING TOO MUCH ABOUT DIFFERENT THINGS: MORE THAN HALF THE DAYS
4. TROUBLE RELAXING: NOT AT ALL
GAD7 TOTAL SCORE: 7
1. FEELING NERVOUS, ANXIOUS, OR ON EDGE: MORE THAN HALF THE DAYS
7. FEELING AFRAID AS IF SOMETHING AWFUL MIGHT HAPPEN: MORE THAN HALF THE DAYS
5. BEING SO RESTLESS THAT IT IS HARD TO SIT STILL: NOT AT ALL
7. FEELING AFRAID AS IF SOMETHING AWFUL MIGHT HAPPEN: MORE THAN HALF THE DAYS
GAD7 TOTAL SCORE: 7

## 2024-01-09 ASSESSMENT — PATIENT HEALTH QUESTIONNAIRE - PHQ9
SUM OF ALL RESPONSES TO PHQ QUESTIONS 1-9: 5
SUM OF ALL RESPONSES TO PHQ QUESTIONS 1-9: 5
10. IF YOU CHECKED OFF ANY PROBLEMS, HOW DIFFICULT HAVE THESE PROBLEMS MADE IT FOR YOU TO DO YOUR WORK, TAKE CARE OF THINGS AT HOME, OR GET ALONG WITH OTHER PEOPLE: NOT DIFFICULT AT ALL

## 2024-01-16 ENCOUNTER — OFFICE VISIT (OUTPATIENT)
Dept: FAMILY MEDICINE | Facility: OTHER | Age: 29
End: 2024-01-16
Attending: FAMILY MEDICINE
Payer: COMMERCIAL

## 2024-01-16 VITALS
WEIGHT: 293 LBS | HEART RATE: 102 BPM | RESPIRATION RATE: 17 BRPM | OXYGEN SATURATION: 97 % | HEIGHT: 62 IN | TEMPERATURE: 98.3 F | DIASTOLIC BLOOD PRESSURE: 78 MMHG | BODY MASS INDEX: 53.92 KG/M2 | SYSTOLIC BLOOD PRESSURE: 136 MMHG

## 2024-01-16 DIAGNOSIS — E11.9 TYPE 2 DIABETES MELLITUS WITHOUT COMPLICATION, WITHOUT LONG-TERM CURRENT USE OF INSULIN (H): ICD-10-CM

## 2024-01-16 DIAGNOSIS — E66.01 MORBID OBESITY (H): ICD-10-CM

## 2024-01-16 DIAGNOSIS — Z00.00 ANNUAL PHYSICAL EXAM: Primary | ICD-10-CM

## 2024-01-16 DIAGNOSIS — E55.9 VITAMIN D DEFICIENCY: ICD-10-CM

## 2024-01-16 DIAGNOSIS — E66.01 MORBID OBESITY WITH BMI OF 50.0-59.9, ADULT (H): ICD-10-CM

## 2024-01-16 DIAGNOSIS — N92.0 MENORRHAGIA WITH REGULAR CYCLE: ICD-10-CM

## 2024-01-16 PROBLEM — O16.3 HYPERTENSION AFFECTING PREGNANCY IN THIRD TRIMESTER: Status: RESOLVED | Noted: 2019-03-28 | Resolved: 2024-01-16

## 2024-01-16 PROBLEM — R00.2 PALPITATIONS: Status: RESOLVED | Noted: 2020-04-28 | Resolved: 2024-01-16

## 2024-01-16 PROBLEM — Z36.89 ENCOUNTER FOR TRIAGE IN PREGNANT PATIENT: Status: RESOLVED | Noted: 2019-03-03 | Resolved: 2024-01-16

## 2024-01-16 LAB
ANION GAP SERPL CALCULATED.3IONS-SCNC: 9 MMOL/L (ref 7–15)
BASOPHILS # BLD AUTO: 0 10E3/UL (ref 0–0.2)
BASOPHILS NFR BLD AUTO: 1 %
BUN SERPL-MCNC: 11.6 MG/DL (ref 6–20)
CALCIUM SERPL-MCNC: 9.8 MG/DL (ref 8.6–10)
CHLORIDE SERPL-SCNC: 103 MMOL/L (ref 98–107)
CHOLEST SERPL-MCNC: 158 MG/DL
CREAT SERPL-MCNC: 0.57 MG/DL (ref 0.51–0.95)
CREAT UR-MCNC: 80.6 MG/DL
DEPRECATED HCO3 PLAS-SCNC: 28 MMOL/L (ref 22–29)
EGFRCR SERPLBLD CKD-EPI 2021: >90 ML/MIN/1.73M2
EOSINOPHIL # BLD AUTO: 0.1 10E3/UL (ref 0–0.7)
EOSINOPHIL NFR BLD AUTO: 2 %
ERYTHROCYTE [DISTWIDTH] IN BLOOD BY AUTOMATED COUNT: 19.9 % (ref 10–15)
FASTING STATUS PATIENT QL REPORTED: NO
GLUCOSE SERPL-MCNC: 120 MG/DL (ref 70–99)
HBA1C MFR BLD: 6.2 % (ref 4–6.2)
HCT VFR BLD AUTO: 35.8 % (ref 35–47)
HDLC SERPL-MCNC: 37 MG/DL
HGB BLD-MCNC: 10.7 G/DL (ref 11.7–15.7)
IMM GRANULOCYTES # BLD: 0.1 10E3/UL
IMM GRANULOCYTES NFR BLD: 1 %
LDLC SERPL CALC-MCNC: 96 MG/DL
LYMPHOCYTES # BLD AUTO: 2.4 10E3/UL (ref 0.8–5.3)
LYMPHOCYTES NFR BLD AUTO: 30 %
MCH RBC QN AUTO: 19.8 PG (ref 26.5–33)
MCHC RBC AUTO-ENTMCNC: 29.9 G/DL (ref 31.5–36.5)
MCV RBC AUTO: 66 FL (ref 78–100)
MICROALBUMIN UR-MCNC: <12 MG/L
MICROALBUMIN/CREAT UR: NORMAL MG/G{CREAT}
MONOCYTES # BLD AUTO: 0.4 10E3/UL (ref 0–1.3)
MONOCYTES NFR BLD AUTO: 6 %
NEUTROPHILS # BLD AUTO: 4.8 10E3/UL (ref 1.6–8.3)
NEUTROPHILS NFR BLD AUTO: 60 %
NONHDLC SERPL-MCNC: 121 MG/DL
NRBC # BLD AUTO: 0 10E3/UL
NRBC BLD AUTO-RTO: 0 /100
PLATELET # BLD AUTO: 324 10E3/UL (ref 150–450)
POTASSIUM SERPL-SCNC: 4.3 MMOL/L (ref 3.4–5.3)
RBC # BLD AUTO: 5.4 10E6/UL (ref 3.8–5.2)
SODIUM SERPL-SCNC: 140 MMOL/L (ref 135–145)
TRIGL SERPL-MCNC: 125 MG/DL
WBC # BLD AUTO: 7.8 10E3/UL (ref 4–11)

## 2024-01-16 PROCEDURE — 90686 IIV4 VACC NO PRSV 0.5 ML IM: CPT

## 2024-01-16 PROCEDURE — 36415 COLL VENOUS BLD VENIPUNCTURE: CPT | Mod: ZL | Performed by: FAMILY MEDICINE

## 2024-01-16 PROCEDURE — 82570 ASSAY OF URINE CREATININE: CPT | Mod: ZL | Performed by: FAMILY MEDICINE

## 2024-01-16 PROCEDURE — 85025 COMPLETE CBC W/AUTO DIFF WBC: CPT | Mod: ZL | Performed by: FAMILY MEDICINE

## 2024-01-16 PROCEDURE — 99395 PREV VISIT EST AGE 18-39: CPT | Performed by: FAMILY MEDICINE

## 2024-01-16 PROCEDURE — 80048 BASIC METABOLIC PNL TOTAL CA: CPT | Mod: ZL | Performed by: FAMILY MEDICINE

## 2024-01-16 PROCEDURE — 82306 VITAMIN D 25 HYDROXY: CPT | Mod: ZL | Performed by: FAMILY MEDICINE

## 2024-01-16 PROCEDURE — 83036 HEMOGLOBIN GLYCOSYLATED A1C: CPT | Mod: ZL | Performed by: FAMILY MEDICINE

## 2024-01-16 PROCEDURE — 80061 LIPID PANEL: CPT | Mod: ZL | Performed by: FAMILY MEDICINE

## 2024-01-16 RX ORDER — KETOCONAZOLE 20 MG/ML
SHAMPOO TOPICAL
COMMUNITY
Start: 2023-11-10

## 2024-01-16 ASSESSMENT — ENCOUNTER SYMPTOMS
DYSURIA: 0
WEAKNESS: 0
ABDOMINAL PAIN: 0
SHORTNESS OF BREATH: 0
DIARRHEA: 0
ARTHRALGIAS: 0
HEARTBURN: 0
FEVER: 0
HEADACHES: 0
MYALGIAS: 0
EYE PAIN: 0
DIZZINESS: 0
JOINT SWELLING: 0
CHILLS: 0
PALPITATIONS: 0
FREQUENCY: 0
NAUSEA: 0
SORE THROAT: 0
HEMATOCHEZIA: 0
BREAST MASS: 0
COUGH: 0
CONSTIPATION: 0
HEMATURIA: 0
PARESTHESIAS: 0
NERVOUS/ANXIOUS: 1

## 2024-01-16 ASSESSMENT — PAIN SCALES - GENERAL: PAINLEVEL: NO PAIN (0)

## 2024-01-16 NOTE — PROGRESS NOTES
SUBJECTIVE:   Laurel is a 28 year old, presenting for the following:  Physical    29 yo female presents for annual exam.    Stopped metformin due to low blood sugars. Never filled prescription for GLP1 agonist    Last A1c 6.6.  Has not had repeat labs in over a year.    Weight down 16# since June, 40 pounds total over the year    This change she made was having small frequent meals throughout the day.  Previously would just eat 1 large meal late in the evening and then go to bed.    Nicotine Vape daily    Healthy Habits:     Getting at least 3 servings of Calcium per day:  Yes    Bi-annual eye exam:  NO    Dental care twice a year:  NO    Sleep apnea or symptoms of sleep apnea:  Daytime drowsiness and Excessive snoring    Diet:  Diabetic    Frequency of exercise:  None    Taking medications regularly:  Yes    Medication side effects:  Lightheadedness    Additional concerns today:  No      Today's PHQ-9 Score:       1/9/2024     4:44 PM   PHQ-9 SCORE   PHQ-9 Total Score MyChart 5 (Mild depression)   PHQ-9 Total Score 5           Diabetes Follow-up    How often are you checking your blood sugar? A few times a week  What time of day are you checking your blood sugars (select all that apply)?  Before meals  Have you had any blood sugars above 200?  No  Have you had any blood sugars below 70?  No  What symptoms do you notice when your blood sugar is low?  Shaky  What concerns do you have today about your diabetes? None   Do you have any of these symptoms? (Select all that apply)  No numbness or tingling in feet.  No redness, sores or blisters on feet.  No complaints of excessive thirst.  No reports of blurry vision.  No significant changes to weight.  Have you had a diabetic eye exam in the last 12 months? No        BP Readings from Last 2 Encounters:   01/16/24 136/78   11/17/23 132/86     Hemoglobin A1C (%)   Date Value   12/09/2022 6.6 (H)             Social History     Tobacco Use    Smoking status: Former      Packs/day: 0.25     Years: 2.00     Additional pack years: 0.00     Total pack years: 0.50     Types: Vaping Device, Cigarettes     Quit date: 2019     Years since quittin.9    Smokeless tobacco: Never    Tobacco comments:     1-3 a day   Substance Use Topics    Alcohol use: Not Currently     Comment: occasional -1-2 drinks a week             2024     4:47 PM   Alcohol Use   Prescreen: >3 drinks/day or >7 drinks/week? Not Applicable     Reviewed orders with patient.  Reviewed health maintenance and updated orders accordingly -       Breast Cancer Screening:  Any new diagnosis of family breast, ovarian, or bowel cancer?     FHS-7:        No data to display                  Pertinent mammograms are reviewed under the imaging tab.    History of abnormal Pap smear: NO - age 21-29 PAP every 3 years recommended      2022    10:33 AM   PAP / HPV   PAP Negative for Intraepithelial Lesion or Malignancy (NILM)      Reviewed and updated as needed this visit by clinical staff   Tobacco  Allergies  Meds      Soc Hx        Reviewed and updated as needed this visit by Provider                     Review of Systems   Constitutional:  Negative for chills and fever.   HENT:  Negative for congestion, ear pain, hearing loss and sore throat.    Eyes:  Negative for pain and visual disturbance.   Respiratory:  Negative for cough and shortness of breath.    Cardiovascular:  Negative for chest pain, palpitations and peripheral edema.   Gastrointestinal:  Negative for abdominal pain, constipation, diarrhea, heartburn, hematochezia and nausea.   Breasts:  Negative for tenderness, breast mass and discharge.   Genitourinary:  Negative for dysuria, frequency, genital sores, hematuria, pelvic pain, urgency, vaginal bleeding and vaginal discharge.   Musculoskeletal:  Negative for arthralgias, joint swelling and myalgias.   Skin:  Negative for rash.   Neurological:  Negative for dizziness, weakness, headaches and paresthesias.  "  Psychiatric/Behavioral:  Negative for mood changes. The patient is nervous/anxious.           OBJECTIVE:   /78   Pulse 102   Temp 98.3  F (36.8  C) (Tympanic)   Resp 17   Ht 1.577 m (5' 2.09\")   Wt 147.1 kg (324 lb 3.2 oz)   LMP 12/16/2023 (Exact Date)   SpO2 97%   BMI 59.13 kg/m    Physical Exam  GENERAL: healthy, alert and no distress  HENT: ear canals and TM's normal, nose and mouth without ulcers or lesions  NECK: no adenopathy, no asymmetry, masses, or scars and thyroid normal to palpation  RESP: lungs clear to auscultation - no rales, rhonchi or wheezes  CV: regular rate and rhythm, normal S1 S2, no S3 or S4, no murmur, click or rub, no peripheral edema and peripheral pulses strong  SKIN: no suspicious lesions or rashes  NEURO: Normal strength and tone, mentation intact and speech normal  PSYCH: mentation appears normal, affect normal/bright        ASSESSMENT/PLAN:       ICD-10-CM    1. Annual physical exam  Z00.00       2. Type 2 diabetes mellitus without complication, without long-term current use of insulin (H)  E11.9 Hemoglobin A1c     Lipid Panel     Albumin Random Urine Quantitative with Creat Ratio     Basic Metabolic Panel      3. Morbid obesity (H)  E66.01       4. Vitamin D deficiency  E55.9 Vitamin D Total      5. Menorrhagia with regular cycle  N92.0 CBC and Differential          Patient is counseled on importance of regular self breast exams and mammograms every 1-2 years starting at age 40. Patient will proceed with pap smears every 3-5 years until age 65. Discussed importance of calcium and vitamin D supplementation and osteoporosis screening. Immunizations are updated based on CDC recommendations and patients desire. Reviewed importance of sunscreen, limit sun exposure and monitoring for changing moles with ABCDEs. Recommend seatbelt use and helmets with biking, skiing and ATV/Snowmobile use.    Once again had a prolonged discussion about ongoing weight management.  She has been " "successful in losing over 40 pounds in the last years through diet modification and increase movement.  She was on metformin 500 mg twice daily for approximately 6 months but discontinued that medicine due to diarrhea and low blood sugars.  She has considered GLP-1 injectables but is worried about long-term use and side effects.    Will repeat labs today.  For now she is decided to stay off all medications.  Congratulated her on her excellent weight loss.  Recommend adding 10 minutes of walking or elliptical per day.    Flu shot provided.  Other preventive cares are up-to-date.    COUNSELING:  Reviewed preventive health counseling, as reflected in patient instructions  Special attention given to:        Regular exercise       Healthy diet/nutrition       Vision screening       Hearing screening       Contraception       Advance Care Planning      BMI:   Estimated body mass index is 59.13 kg/m  as calculated from the following:    Height as of this encounter: 1.577 m (5' 2.09\").    Weight as of this encounter: 147.1 kg (324 lb 3.2 oz).   Weight management plan: Discussed healthy diet and exercise guidelines      She reports that she quit smoking about 4 years ago. Her smoking use included vaping device and cigarettes. She has a 0.5 pack-year smoking history. She has never used smokeless tobacco.          Sho Joya MD  [facility]  "

## 2024-01-16 NOTE — NURSING NOTE
"Chief Complaint   Patient presents with    Physical       Initial /78   Pulse 102   Temp 98.3  F (36.8  C) (Tympanic)   Resp 17   Ht 1.577 m (5' 2.09\")   Wt 147.1 kg (324 lb 3.2 oz)   LMP 12/16/2023 (Exact Date)   SpO2 97%   BMI 59.13 kg/m   Estimated body mass index is 59.13 kg/m  as calculated from the following:    Height as of this encounter: 1.577 m (5' 2.09\").    Weight as of this encounter: 147.1 kg (324 lb 3.2 oz).  Medication Reconciliation: complete      Berenice Nleson LPN on 1/16/2024 at 9:49 AM     "

## 2024-01-17 LAB — VIT D+METAB SERPL-MCNC: 18 NG/ML (ref 20–50)

## 2024-01-19 ENCOUNTER — TRANSFERRED RECORDS (OUTPATIENT)
Dept: HEALTH INFORMATION MANAGEMENT | Facility: OTHER | Age: 29
End: 2024-01-19
Payer: COMMERCIAL

## 2024-01-19 LAB — RETINOPATHY: NEGATIVE

## 2024-01-23 ENCOUNTER — MYC MEDICAL ADVICE (OUTPATIENT)
Dept: FAMILY MEDICINE | Facility: OTHER | Age: 29
End: 2024-01-23
Payer: COMMERCIAL

## 2024-02-01 ENCOUNTER — TELEPHONE (OUTPATIENT)
Dept: FAMILY MEDICINE | Facility: OTHER | Age: 29
End: 2024-02-01
Payer: COMMERCIAL

## 2024-02-01 DIAGNOSIS — Z20.818 STREPTOCOCCUS EXPOSURE: ICD-10-CM

## 2024-02-01 DIAGNOSIS — J02.0 STREP THROAT: Primary | ICD-10-CM

## 2024-02-01 RX ORDER — AMOXICILLIN 500 MG/1
500 CAPSULE ORAL 2 TIMES DAILY
Qty: 20 CAPSULE | Refills: 0 | Status: SHIPPED | OUTPATIENT
Start: 2024-02-01 | End: 2024-02-11

## 2024-02-01 NOTE — TELEPHONE ENCOUNTER
Sent amoxicillin antibiotic to the pharmacy to start over the next few days if symptoms are persistent or worsening for probable strep throat.  Pao Anglin PA-C.......... 2/1/2024  2:48 PM

## 2024-02-01 NOTE — TELEPHONE ENCOUNTER
Call to patient, her daughter tested positive for strep today.   They are leaving town in 2 days for a cruise.   Patient reports a mild sore throat and ear pain for 2 hours. No other symptoms.  Is worried she will test negative today, but may become more symptomatic and be positive in a day or two.    I did advise to be seen if she was wanting a test done. She is requesting antibiotics be sent to [facility]. PCP is out of office, routing to covering provider for consideration per patient request.      Brittny Brooks RN on 2/1/2024 at 11:34 AM

## 2024-02-01 NOTE — TELEPHONE ENCOUNTER
Patient request a call back regarding getting a prescription for strep exposure for herself and son (separate note sent back to primary, CAROL ANN). Patients daughter tested positive GICH RC today. Patient stated the family with be going on a trip in two days.    [facility]    Reason for call: Medication or medication refill    Name of medication requested: strep    How many days of medication do you have left? NEW    What [facility] you use? [facility]    Preferred method for responding to this message: Telephone Call    Phone number patient can be reached at: Cell number on file:    Telephone Information:   Mobile [phone]       If we cannot reach you directly, may we leave a detailed response at the number you provided? Dotty Gandhi on 2/1/2024 at 11:23 AM

## 2024-02-02 ENCOUNTER — OFFICE VISIT (OUTPATIENT)
Dept: FAMILY MEDICINE | Facility: OTHER | Age: 29
End: 2024-02-02
Payer: COMMERCIAL

## 2024-02-02 VITALS
RESPIRATION RATE: 17 BRPM | DIASTOLIC BLOOD PRESSURE: 79 MMHG | HEIGHT: 62 IN | OXYGEN SATURATION: 100 % | SYSTOLIC BLOOD PRESSURE: 121 MMHG | BODY MASS INDEX: 53.92 KG/M2 | WEIGHT: 293 LBS | TEMPERATURE: 98.2 F | HEART RATE: 75 BPM

## 2024-02-02 DIAGNOSIS — J02.9 SORE THROAT: Primary | ICD-10-CM

## 2024-02-02 LAB — GROUP A STREP BY PCR: NOT DETECTED

## 2024-02-02 PROCEDURE — G0463 HOSPITAL OUTPT CLINIC VISIT: HCPCS

## 2024-02-02 PROCEDURE — 87651 STREP A DNA AMP PROBE: CPT | Mod: ZL | Performed by: REGISTERED NURSE

## 2024-02-02 PROCEDURE — 99203 OFFICE O/P NEW LOW 30 MIN: CPT | Performed by: REGISTERED NURSE

## 2024-02-02 ASSESSMENT — ENCOUNTER SYMPTOMS
FEVER: 0
ACTIVITY CHANGE: 0
DIARRHEA: 0
COUGH: 0
SINUS PRESSURE: 1
SHORTNESS OF BREATH: 0
SINUS PAIN: 1
SORE THROAT: 1
NAUSEA: 0

## 2024-02-02 ASSESSMENT — PAIN SCALES - GENERAL: PAINLEVEL: MILD PAIN (2)

## 2024-02-02 NOTE — PROGRESS NOTES
"Simi Godwin  1995    ASSESSMENT/PLAN:   1. Sore throat    - Group A Streptococcus PCR Throat Swab    Strep results returned negative today. Discussed with patient that symptoms and exam are consistent with viral illness.  Discussed that symptomatic treatment is appropriate but not with antibiotics.   Reviewed symptomatic care with patient.  She was instructed to follow-up for any new or worsening symptoms.    Patient agrees with plan of care and verbalizes understating. AVS printed. Patient education provided verbally and written instructions provided as requested. Patient made aware of emergent signs and symptoms to monitor for and when to seek additional care/follow up.     SUBJECTIVE:   CHIEF COMPLAINT/ REASON FOR VISIT  Patient presents with:  Pharyngitis     HISTORY OF PRESENT ILLNESS  Simi Godwin is a pleasant 29 year old female presents to [facility] today 2-day history of sore throat and right ear pain.  She did bring her daughter in yesterday and she tested positive for strep.  Has been having low-grade temps in addition to headaches, and sinus pressure.  She is not taking anything for her symptoms.  She will be leaving on vacation tomorrow.    I have reviewed the nursing notes.  I have reviewed allergies, medication list, problem list, and past medical history.    REVIEW OF SYSTEMS  Review of Systems   Constitutional:  Negative for activity change and fever.   HENT:  Positive for congestion, ear pain, postnasal drip, sinus pressure, sinus pain and sore throat.    Respiratory:  Negative for cough and shortness of breath.    Gastrointestinal:  Negative for diarrhea and nausea.        VITAL SIGNS  Vitals:    02/02/24 0912   BP: 121/79   BP Location: Left arm   Patient Position: Sitting   Cuff Size: Adult Large   Pulse: 75   Resp: 17   Temp: 98.2  F (36.8  C)   TempSrc: Tympanic   SpO2: 100%   Weight: 147 kg (324 lb)   Height: 1.575 m (5' 2\")      Body mass index is 59.26 " kg/m .    OBJECTIVE:   PHYSICAL EXAM  Physical Exam  Constitutional:       Appearance: She is not ill-appearing.   HENT:      Ears:      Comments: Bilateral serous effusion     Nose: No congestion.      Mouth/Throat:      Pharynx: No oropharyngeal exudate or posterior oropharyngeal erythema.   Cardiovascular:      Rate and Rhythm: Normal rate and regular rhythm.   Pulmonary:      Effort: No respiratory distress.      Breath sounds: No wheezing or rales.   Neurological:      Mental Status: She is alert.          DIAGNOSTICS  Results for orders placed or performed in visit on 02/02/24   Group A Streptococcus PCR Throat Swab     Status: Normal    Specimen: Throat; Swab   Result Value Ref Range    Group A strep by PCR Not Detected Not Detected    Narrative    The Xpert Xpress Strep A test, performed on the [employer]  Instrument Systems, is a rapid, qualitative in vitro diagnostic test for the detection of Streptococcus pyogenes (Group A ß-hemolytic Streptococcus, Strep A) in throat swab specimens from patients with signs and symptoms of pharyngitis. The Xpert Xpress Strep A test can be used as an aid in the diagnosis of Group A Streptococcal pharyngitis. The assay is not intended to monitor treatment for Group A Streptococcus infections. The Xpert Xpress Strep A test utilizes an automated real-time polymerase chain reaction (PCR) to detect Streptococcus pyogenes DNA.          VERÓNICA Guerrero [facility]

## 2024-02-19 DIAGNOSIS — E11.9 TYPE 2 DIABETES MELLITUS WITHOUT COMPLICATION, WITHOUT LONG-TERM CURRENT USE OF INSULIN (H): ICD-10-CM

## 2024-02-21 RX ORDER — BLOOD SUGAR DIAGNOSTIC
STRIP MISCELLANEOUS
Qty: 100 STRIP | Refills: 3 | Status: SHIPPED | OUTPATIENT
Start: 2024-02-21

## 2024-02-21 NOTE — TELEPHONE ENCOUNTER
"Anjelica BELTRÁN sent Rx request for the following:      Requested Prescriptions   Pending Prescriptions Disp Refills    ACCU-CHEK GUIDE test strip [Pharmacy Med Name: ACCU-CHEK GUIDE TEST STRIPS 50] 100 strip 3     Sig: USE TO TEST BLOOD SUGAR ONE TIME DAILY       Diabetic Supplies Protocol Failed - 2/21/2024 10:52 AM        Failed - Recent (6 mo) or future (30 days) visit within the authorizing provider's specialty     Patient had office visit in the last 6 months or has a visit in the next 30 days with authorizing provider.  See \"Patient Info\" tab in inbasket, or \"Choose Columns\" in Meds & Orders section of the refill encounter.           Last Prescription Date:   1/30/23  Last Fill Qty/Refills:         100, R-3    Last Office Visit:              1/16/24   Future Office visit:           none    Patient had an appointment 1/16/24    Jennifer Arcos RN on 2/21/2024 at 10:56 AM          "

## 2024-03-08 DIAGNOSIS — E66.01 MORBID OBESITY (H): ICD-10-CM

## 2024-03-12 NOTE — TELEPHONE ENCOUNTER
[facility] sent Rx request for the following:      Requested Prescriptions   Pending Prescriptions Disp Refills    metFORMIN (GLUCOPHAGE) 500 MG tablet [Pharmacy Med Name: METFORMIN 500MG TABLETS] 180 tablet 0     Sig: TAKE 1 TABLET(500 MG) BY MOUTH TWICE DAILY WITH MEALS       Biguanide Agents Failed - 3/12/2024 10:54 AM        Failed - Medication indicated for associated diagnosis     Medication is associated with one or more of the following diagnoses:     Gestational diabetes mellitus     Hyperinsulinar obesity     Hypersecretion of ovarian androgens    Non-alcoholic fatty liver    Polycystic ovarian syndrome               Pre-diabetes (DM 2 prevention)    Type 2 diabetes mellitus     Weight gain, antipsychotic therapy-induced      Last Prescription Date:   12/12/23  Last Fill Qty/Refills:         180, R-0    Last Office Visit:              1/16/24   Future Office visit:           None    Unable to complete prescription refill per RN Medication Refill Policy.     Miguelina Newman RN .............. 3/12/2024  10:57 AM

## 2024-04-26 PROCEDURE — 99285 EMERGENCY DEPT VISIT HI MDM: CPT | Mod: 25 | Performed by: EMERGENCY MEDICINE

## 2024-04-26 PROCEDURE — 93005 ELECTROCARDIOGRAM TRACING: CPT | Performed by: EMERGENCY MEDICINE

## 2024-04-26 PROCEDURE — 99284 EMERGENCY DEPT VISIT MOD MDM: CPT | Performed by: EMERGENCY MEDICINE

## 2024-04-27 ENCOUNTER — ALLIED HEALTH/NURSE VISIT (OUTPATIENT)
Dept: FAMILY MEDICINE | Facility: OTHER | Age: 29
End: 2024-04-27
Attending: EMERGENCY MEDICINE
Payer: COMMERCIAL

## 2024-04-27 ENCOUNTER — APPOINTMENT (OUTPATIENT)
Dept: GENERAL RADIOLOGY | Facility: OTHER | Age: 29
End: 2024-04-27
Attending: EMERGENCY MEDICINE
Payer: COMMERCIAL

## 2024-04-27 ENCOUNTER — HOSPITAL ENCOUNTER (EMERGENCY)
Facility: OTHER | Age: 29
Discharge: HOME | End: 2024-04-27
Attending: EMERGENCY MEDICINE | Admitting: EMERGENCY MEDICINE
Payer: COMMERCIAL

## 2024-04-27 VITALS
HEART RATE: 99 BPM | WEIGHT: 293 LBS | OXYGEN SATURATION: 100 % | HEIGHT: 62 IN | TEMPERATURE: 98.5 F | BODY MASS INDEX: 53.92 KG/M2 | SYSTOLIC BLOOD PRESSURE: 140 MMHG | RESPIRATION RATE: 12 BRPM | DIASTOLIC BLOOD PRESSURE: 82 MMHG

## 2024-04-27 DIAGNOSIS — R00.2 PALPITATIONS: ICD-10-CM

## 2024-04-27 DIAGNOSIS — D50.9 MICROCYTIC ANEMIA: ICD-10-CM

## 2024-04-27 DIAGNOSIS — D50.8 IRON DEFICIENCY ANEMIA SECONDARY TO INADEQUATE DIETARY IRON INTAKE: ICD-10-CM

## 2024-04-27 LAB
ALBUMIN UR-MCNC: NEGATIVE MG/DL
ANION GAP SERPL CALCULATED.3IONS-SCNC: 13 MMOL/L (ref 7–15)
APPEARANCE UR: CLEAR
BASOPHILS # BLD AUTO: 0 10E3/UL (ref 0–0.2)
BASOPHILS NFR BLD AUTO: 1 %
BILIRUB UR QL STRIP: NEGATIVE
BUN SERPL-MCNC: 12.7 MG/DL (ref 6–20)
CALCIUM SERPL-MCNC: 9.6 MG/DL (ref 8.6–10)
CHLORIDE SERPL-SCNC: 99 MMOL/L (ref 98–107)
COLOR UR AUTO: YELLOW
CREAT SERPL-MCNC: 0.67 MG/DL (ref 0.51–0.95)
DEPRECATED HCO3 PLAS-SCNC: 25 MMOL/L (ref 22–29)
EGFRCR SERPLBLD CKD-EPI 2021: >90 ML/MIN/1.73M2
EOSINOPHIL # BLD AUTO: 0.1 10E3/UL (ref 0–0.7)
EOSINOPHIL NFR BLD AUTO: 2 %
ERYTHROCYTE [DISTWIDTH] IN BLOOD BY AUTOMATED COUNT: 20.3 % (ref 10–15)
FERRITIN SERPL-MCNC: 11 NG/ML (ref 6–175)
GLUCOSE SERPL-MCNC: 118 MG/DL (ref 70–99)
GLUCOSE UR STRIP-MCNC: NEGATIVE MG/DL
HCG SERPL QL: NEGATIVE
HCT VFR BLD AUTO: 32 % (ref 35–47)
HGB BLD-MCNC: 9.1 G/DL (ref 11.7–15.7)
HGB UR QL STRIP: NEGATIVE
IMM GRANULOCYTES # BLD: 0.1 10E3/UL
IMM GRANULOCYTES NFR BLD: 1 %
INR PPP: 1.13 (ref 0.85–1.15)
IRON BINDING CAPACITY (ROCHE): 432 UG/DL (ref 240–430)
IRON SATN MFR SERPL: 7 % (ref 15–46)
IRON SERPL-MCNC: 32 UG/DL (ref 37–145)
KETONES UR STRIP-MCNC: NEGATIVE MG/DL
LEUKOCYTE ESTERASE UR QL STRIP: NEGATIVE
LYMPHOCYTES # BLD AUTO: 2.6 10E3/UL (ref 0.8–5.3)
LYMPHOCYTES NFR BLD AUTO: 31 %
MAGNESIUM SERPL-MCNC: 2.1 MG/DL (ref 1.7–2.3)
MCH RBC QN AUTO: 18.5 PG (ref 26.5–33)
MCHC RBC AUTO-ENTMCNC: 28.4 G/DL (ref 31.5–36.5)
MCV RBC AUTO: 65 FL (ref 78–100)
MONOCYTES # BLD AUTO: 0.5 10E3/UL (ref 0–1.3)
MONOCYTES NFR BLD AUTO: 5 %
MUCOUS THREADS #/AREA URNS LPF: PRESENT /LPF
NEUTROPHILS # BLD AUTO: 5.2 10E3/UL (ref 1.6–8.3)
NEUTROPHILS NFR BLD AUTO: 61 %
NITRATE UR QL: NEGATIVE
NRBC # BLD AUTO: 0 10E3/UL
NRBC BLD AUTO-RTO: 0 /100
PH UR STRIP: 6.5 [PH] (ref 5–9)
PLATELET # BLD AUTO: 330 10E3/UL (ref 150–450)
POTASSIUM SERPL-SCNC: 3.9 MMOL/L (ref 3.4–5.3)
RBC # BLD AUTO: 4.91 10E6/UL (ref 3.8–5.2)
RBC URINE: 0 /HPF
RETICS # AUTO: 0.13 10E6/UL (ref 0.03–0.1)
RETICS/RBC NFR AUTO: 2.5 % (ref 0.5–2)
SODIUM SERPL-SCNC: 137 MMOL/L (ref 135–145)
SP GR UR STRIP: 1 (ref 1–1.03)
SQUAMOUS EPITHELIAL: 1 /HPF
TROPONIN T SERPL HS-MCNC: <6 NG/L
TSH SERPL DL<=0.005 MIU/L-ACNC: 3.17 UIU/ML (ref 0.3–4.2)
UROBILINOGEN UR STRIP-MCNC: NORMAL MG/DL
WBC # BLD AUTO: 8.5 10E3/UL (ref 4–11)
WBC URINE: <1 /HPF

## 2024-04-27 PROCEDURE — 84443 ASSAY THYROID STIM HORMONE: CPT | Performed by: EMERGENCY MEDICINE

## 2024-04-27 PROCEDURE — 85025 COMPLETE CBC W/AUTO DIFF WBC: CPT | Performed by: EMERGENCY MEDICINE

## 2024-04-27 PROCEDURE — 85045 AUTOMATED RETICULOCYTE COUNT: CPT | Performed by: EMERGENCY MEDICINE

## 2024-04-27 PROCEDURE — 81003 URINALYSIS AUTO W/O SCOPE: CPT | Performed by: EMERGENCY MEDICINE

## 2024-04-27 PROCEDURE — 71045 X-RAY EXAM CHEST 1 VIEW: CPT | Mod: TC

## 2024-04-27 PROCEDURE — 85610 PROTHROMBIN TIME: CPT | Performed by: EMERGENCY MEDICINE

## 2024-04-27 PROCEDURE — 36415 COLL VENOUS BLD VENIPUNCTURE: CPT | Performed by: EMERGENCY MEDICINE

## 2024-04-27 PROCEDURE — 83735 ASSAY OF MAGNESIUM: CPT | Performed by: EMERGENCY MEDICINE

## 2024-04-27 PROCEDURE — 84484 ASSAY OF TROPONIN QUANT: CPT | Performed by: EMERGENCY MEDICINE

## 2024-04-27 PROCEDURE — 82728 ASSAY OF FERRITIN: CPT | Performed by: EMERGENCY MEDICINE

## 2024-04-27 PROCEDURE — 93010 ELECTROCARDIOGRAM REPORT: CPT | Performed by: INTERNAL MEDICINE

## 2024-04-27 PROCEDURE — 83550 IRON BINDING TEST: CPT | Performed by: EMERGENCY MEDICINE

## 2024-04-27 PROCEDURE — 84703 CHORIONIC GONADOTROPIN ASSAY: CPT | Performed by: EMERGENCY MEDICINE

## 2024-04-27 PROCEDURE — 93242 EXT ECG>48HR<7D RECORDING: CPT

## 2024-04-27 PROCEDURE — 80048 BASIC METABOLIC PNL TOTAL CA: CPT | Performed by: EMERGENCY MEDICINE

## 2024-04-27 RX ORDER — FERROUS SULFATE 325(65) MG
325 TABLET, DELAYED RELEASE (ENTERIC COATED) ORAL DAILY
Qty: 90 TABLET | Refills: 0 | Status: SHIPPED | OUTPATIENT
Start: 2024-04-27 | End: 2024-06-28

## 2024-04-27 RX ORDER — FERROUS SULFATE 325(65) MG
325 TABLET, DELAYED RELEASE (ENTERIC COATED) ORAL DAILY
Qty: 90 TABLET | Refills: 0 | Status: SHIPPED | OUTPATIENT
Start: 2024-04-27 | End: 2024-04-27

## 2024-04-27 ASSESSMENT — ENCOUNTER SYMPTOMS
GASTROINTESTINAL NEGATIVE: 1
RESPIRATORY NEGATIVE: 1
PSYCHIATRIC NEGATIVE: 1
NEUROLOGICAL NEGATIVE: 1
EYES NEGATIVE: 1
ALLERGIC/IMMUNOLOGIC NEGATIVE: 1
CONSTITUTIONAL NEGATIVE: 1
PALPITATIONS: 1
FEVER: 0
HEMATOLOGIC/LYMPHATIC NEGATIVE: 1
ENDOCRINE NEGATIVE: 1
MUSCULOSKELETAL NEGATIVE: 1

## 2024-04-27 ASSESSMENT — COLUMBIA-SUICIDE SEVERITY RATING SCALE - C-SSRS
6. HAVE YOU EVER DONE ANYTHING, STARTED TO DO ANYTHING, OR PREPARED TO DO ANYTHING TO END YOUR LIFE?: NO
1. IN THE PAST MONTH, HAVE YOU WISHED YOU WERE DEAD OR WISHED YOU COULD GO TO SLEEP AND NOT WAKE UP?: NO
2. HAVE YOU ACTUALLY HAD ANY THOUGHTS OF KILLING YOURSELF IN THE PAST MONTH?: NO

## 2024-04-27 ASSESSMENT — ACTIVITIES OF DAILY LIVING (ADL)
ADLS_ACUITY_SCORE: 35

## 2024-04-27 NOTE — ED PROVIDER NOTES
History     Chief Complaint   Patient presents with    Palpitations     HPI  Simi Godwin is a 29 year old female who presents today with complaints of palpitations.  Patient states that symptoms began at 2 PM today but at the time of history patient no longer having any palpitations.  Has a history of SVT in the past.  Has had an ablation.  Denies any shortness of breath, chest pain or any additional complaints.    Allergies:  No Known Allergies    Problem List:    Patient Active Problem List    Diagnosis Date Noted    Diabetes mellitus, type 2 (H) 12/15/2022     Priority: Medium    History of cardiac radiofrequency ablation for SVT 2020     Priority: Medium    H/O supraventricular tachycardia 2020     Priority: Medium    S/P  section 2019     Priority: Medium    History of  2018     Priority: Medium    Nicotine use disorder 2018     Priority: Medium    BMI 50.0-59.9, adult (H) 2016     Priority: Medium        Past Medical History:    Past Medical History:   Diagnosis Date    Obesity complicating pregnancy 2016    Polyhydramnios     Supraventricular tachycardia (H24)        Past Surgical History:    Past Surgical History:   Procedure Laterality Date     SECTION N/A 2019    Procedure:  SECTION;  Surgeon: Tomer Pereira MD;  Location: [facility] OR    [facility] OR CATH ABLATION NON-CARDIAC ENDOVASCULAR NTAP      AVNRT    RELEASE CARPAL TUNNEL Bilateral     TONSILLECTOMY         Family History:    Family History   Problem Relation Age of Onset    Coronary Artery Disease Father 59    Thrombosis Father     Heart Failure Brother 30    Diabetes Maternal Grandfather     Cancer Paternal Grandfather     Breast Cancer No family hx of     Colon Cancer No family hx of        Social History:  Marital Status:  Single [1]  Social History     Tobacco Use    Smoking status: Former     Current packs/day: 0.00     Average packs/day: 0.3 packs/day for  "2.0 years (0.5 ttl pk-yrs)     Types: Vaping Device, Cigarettes     Start date: 2017     Quit date: 2019     Years since quittin.2    Smokeless tobacco: Never    Tobacco comments:     1-3 a day   Vaping Use    Vaping status: Every Day    Substances: Nicotine, Flavoring    Devices: Pre-filled or refillable cartridge   Substance Use Topics    Alcohol use: Not Currently     Comment: occasional -1-2 drinks a week    Drug use: No     Comment: Drug use: No        Medications:    ACCU-CHEK GUIDE test strip  blood glucose (NO BRAND SPECIFIED) lancets standard  blood glucose monitoring (NO BRAND SPECIFIED) meter device kit  ketoconazole (NIZORAL) 2 % external shampoo  metFORMIN (GLUCOPHAGE) 500 MG tablet  omeprazole (PRILOSEC) 20 MG DR capsule  semaglutide (OZEMPIC) 2 MG/1.5ML SOPN pen  sertraline (ZOLOFT) 50 MG tablet  vitamin D3 (CHOLECALCIFEROL) 1.25 MG (54638 UT) capsule          Review of Systems   Constitutional: Negative.  Negative for fever.   HENT: Negative.     Eyes: Negative.    Respiratory: Negative.     Cardiovascular:  Positive for palpitations. Negative for chest pain.   Gastrointestinal: Negative.    Endocrine: Negative.    Genitourinary: Negative.    Musculoskeletal: Negative.    Allergic/Immunologic: Negative.    Neurological: Negative.    Hematological: Negative.    Psychiatric/Behavioral: Negative.         Physical Exam   BP: (!) 156/97  Pulse: 98  Temp: 98.5  F (36.9  C)  Resp: 19  Height: 157.5 cm (5' 2\")  Weight: 147 kg (324 lb)  SpO2: 100 %      Physical Exam  Constitutional:       General: She is not in acute distress.     Appearance: Normal appearance. She is normal weight. She is not toxic-appearing.   HENT:      Head: Normocephalic and atraumatic.      Right Ear: Tympanic membrane normal.   Eyes:      Extraocular Movements: Extraocular movements intact.      Pupils: Pupils are equal, round, and reactive to light.   Cardiovascular:      Rate and Rhythm: Normal rate.   Pulmonary:      " Effort: Pulmonary effort is normal.   Abdominal:      General: Abdomen is flat.      Palpations: Abdomen is soft.   Musculoskeletal:         General: Normal range of motion.      Cervical back: Normal range of motion.   Skin:     General: Skin is warm.      Capillary Refill: Capillary refill takes less than 2 seconds.   Neurological:      General: No focal deficit present.      Mental Status: She is alert.   Psychiatric:         Mood and Affect: Mood normal.         Behavior: Behavior normal.         ED Course     ED Course as of 04/27/24 0328   Sat Apr 27, 2024   0326 No episodes of palpitations noted here.  Patient showed me a rhythm strip captured by her Apple Watch which where suggestive of occasional PVCs   0326 Labs demonstrating low iron stores.  Suspect iron deficiency anemia.  Patient opts   0327 declines rectal exam.  Patient opts for start iron at home and will follow-up with primary care doctor on Monday.  Understands to return in 12 to 24 hours for repeat hemoglobin.  Understands that she must return to the ER if hemoglobin is lower or she develops any symptoms including dizziness, lightheadedness, chest pain or shortness of breath     Procedures         EKG - NSR with ST elevation. EKG notes occasional PVCs         Results for orders placed or performed during the hospital encounter of 04/27/24 (from the past 24 hour(s))   [place] Draw *Canceled*    Narrative    The following orders were created for panel order [place] Draw.  Procedure                               Abnormality         Status                     ---------                               -----------         ------                       Please view results for these tests on the individual orders.   UA with Microscopic reflex to Culture    Specimen: Urine, Midstream   Result Value Ref Range    Color Urine Yellow Colorless, Straw, Light Yellow, Yellow    Appearance Urine Clear Clear    Glucose Urine Negative Negative mg/dL    Bilirubin Urine  Negative Negative    Ketones Urine Negative Negative mg/dL    Specific Gravity Urine 1.005 1.000 - 1.030    Blood Urine Negative Negative    pH Urine 6.5 5.0 - 9.0    Protein Albumin Urine Negative Negative mg/dL    Urobilinogen Urine Normal Normal, 2.0 mg/dL    Nitrite Urine Negative Negative    Leukocyte Esterase Urine Negative Negative    Mucus Urine Present (A) None Seen /LPF    RBC Urine 0 <=2 /HPF    WBC Urine <1 <=5 /HPF    Squamous Epithelials Urine 1 <=1 /HPF    Narrative    Urine Culture not indicated   CBC with platelets differential    Narrative    The following orders were created for panel order CBC with platelets differential.  Procedure                               Abnormality         Status                     ---------                               -----------         ------                     CBC with platelets and d...[[id number]]  Abnormal            Final result                 Please view results for these tests on the individual orders.   INR   Result Value Ref Range    INR 1.13 0.85 - 1.15   Basic metabolic panel   Result Value Ref Range    Sodium 137 135 - 145 mmol/L    Potassium 3.9 3.4 - 5.3 mmol/L    Chloride 99 98 - 107 mmol/L    Carbon Dioxide (CO2) 25 22 - 29 mmol/L    Anion Gap 13 7 - 15 mmol/L    Urea Nitrogen 12.7 6.0 - 20.0 mg/dL    Creatinine 0.67 0.51 - 0.95 mg/dL    GFR Estimate >90 >60 mL/min/1.73m2    Calcium 9.6 8.6 - 10.0 mg/dL    Glucose 118 (H) 70 - 99 mg/dL   Magnesium   Result Value Ref Range    Magnesium 2.1 1.7 - 2.3 mg/dL   Troponin T, High Sensitivity   Result Value Ref Range    Troponin T, High Sensitivity <6 <=14 ng/L   TSH Reflex [facility]   Result Value Ref Range    TSH 3.17 0.30 - 4.20 uIU/mL   HCG qualitative Blood   Result Value Ref Range    hCG Serum Qualitative Negative Negative   CBC with platelets and differential   Result Value Ref Range    WBC Count 8.5 4.0 - 11.0 10e3/uL    RBC Count 4.91 3.80 - 5.20 10e6/uL    Hemoglobin 9.1 (L) 11.7 - 15.7 g/dL     Hematocrit 32.0 (L) 35.0 - 47.0 %    MCV 65 (L) 78 - 100 fL    MCH 18.5 (L) 26.5 - 33.0 pg    MCHC 28.4 (L) 31.5 - 36.5 g/dL    RDW 20.3 (H) 10.0 - 15.0 %    Platelet Count 330 150 - 450 10e3/uL    % Neutrophils 61 %    % Lymphocytes 31 %    % Monocytes 5 %    % Eosinophils 2 %    % Basophils 1 %    % Immature Granulocytes 1 %    NRBCs per 100 WBC 0 <1 /100    Absolute Neutrophils 5.2 1.6 - 8.3 10e3/uL    Absolute Lymphocytes 2.6 0.8 - 5.3 10e3/uL    Absolute Monocytes 0.5 0.0 - 1.3 10e3/uL    Absolute Eosinophils 0.1 0.0 - 0.7 10e3/uL    Absolute Basophils 0.0 0.0 - 0.2 10e3/uL    Absolute Immature Granulocytes 0.1 <=0.4 10e3/uL    Absolute NRBCs 0.0 10e3/uL   Iron and iron binding capacity   Result Value Ref Range    Iron 32 (L) 37 - 145 ug/dL    Iron Binding Capacity 432 (H) 240 - 430 ug/dL    Iron Sat Index 7 (L) 15 - 46 %   Ferritin   Result Value Ref Range    Ferritin 11 6 - 175 ng/mL   XR Chest Port 1 View    Narrative    PROCEDURE INFORMATION:   Exam: XR Chest   Exam date and time: 4/27/2024 1:15 AM   Age: 29 years old   Clinical indication: Other: Palpations; Additional info: 29-year-old with   complaints of palpitations. Rule out pneumonia     TECHNIQUE:   Imaging protocol: Radiologic exam of the chest.   Views: 1 view.     COMPARISON:   CR Chest 3/31/2020 1:03 PM     FINDINGS:   Lungs: Unremarkable. No consolidation.   Pleural spaces: Unremarkable. No pleural effusion. No pneumothorax.   Heart/Mediastinum:  Stable size and configuration.   Bones/joints:  Unchanged.       Impression    IMPRESSION:   No evidence of acute pathology.     THIS DOCUMENT HAS BEEN ELECTRONICALLY SIGNED BY JACQUELINE DUMONT MD       Medications - No data to display    Assessments & Plan (with Medical Decision Making)     29-year-old female who presents today with palpitations.  Patient stated she had palpitations for several hours prompting ER visit.  No chest pain or shortness of breath.  On arrival EKG showed normal sinus rhythm  with a normal rate.  No significant arrhythmia was noted however patient EKG readout did mention presence of PVCs.      Patient's exam largely unremarkable.  Labs however showed hemoglobin 9.1 which is decreased from her prior hemoglobin of 10.4 in January.  Iron studies revealing low iron stores.  Patient admits that she has been eating a lot of ice and also has not been on any iron supplements for many years.  Patient denies any hematemesis or blood in stool.  Patient declined rectal exam.      At this time I believe based on lab results that patient's anemia is most likely due to iron deficiency.  Overall palpitations more likely due to PVCs that patient is interpreting as palpitations.  Patient set up for Zio patch for monitoring.  Patient states that she will start over-the-counter iron supplementation today but will follow-up with her doctor on Monday for workup and actual prescription.  Patient understands to return tomorrow to recheck her hemoglobin.  If it significantly lower she should return to the urgent care or ER for a reevaluation.      Patient also understands to return to the ER if at any point she develops chest pain, shortness of breath lightheadedness or dizziness.      7pm - Addendum: Rx for Iron Sulfate sent to the [facility] pharmacy. Patient called at home and aware.       New Prescriptions    No medications on file       Final diagnoses:   Iron deficiency anemia secondary to inadequate dietary iron intake   Microcytic anemia   Palpitations       4/26/2024   [facility]       Matt Wong MD  04/27/24 1914

## 2024-04-27 NOTE — PROGRESS NOTES
Simi Godwin arrived here on 4/27/2024 3:59 AM for 3-7 Days  Zio monitor placement per ordering provider Marvin for the diagnosis Palpitations.  Patient s skin was prepped per protocol.  Zio monitor was placed.  Instructions were reviewed with and given to the patient.  Patient verbalized understanding of wear, troubleshooting and monitor return instructions.

## 2024-04-27 NOTE — DISCHARGE INSTRUCTIONS
1) Follow the aftercare instructions provided  2) You must return tomorrow to the lab to recheck your hemoglobin  3) If your hemoglobin is lower on recheck, go to the urgent care or ER for a reevaluation  4) Follow-up with your doctor on Monday  5) Your blood pressure today was elevated. You must have it rechecked by your primary care doctor within one week.   6) You will be contacted to have a heart monitor attached to your chest to monitor your heartbeat for a few days  7) Return to the ER if you develop any chest pain or shortness of breath

## 2024-04-27 NOTE — ED TRIAGE NOTES
"ED Nursing Triage Note (General)   ________________________________    Simi Godwin is a 29 year old Female that presents to triage via private vehicle with complaints of palpitations since 1400. Patient states they have not been getting worse over time, however, are just not going away.  Patient states hx of SVT with an ablation in 2012 with no complications since then.  Patient denies any chest pain, tightness or SOB at this time. Heart rate remains stable in triage at 97-99 BPM.   Significant symptoms had onset 10 hour(s) ago.  BP (!) 156/97   Pulse 98   Temp 98.5  F (36.9  C) (Tympanic)   Resp 19   Ht 1.575 m (5' 2\")   Wt 147 kg (324 lb)   SpO2 100%   BMI 59.26 kg/m     PRE HOSPITAL PRIOR LIVING SITUATION Children Only      Triage Assessment (Adult)       Row Name 04/27/24 0002          Triage Assessment    Airway WDL WDL        Respiratory WDL    Respiratory WDL WDL        Skin Circulation/Temperature WDL    Skin Circulation/Temperature WDL WDL        Cardiac WDL    Cardiac WDL WDL     Cardiac Rhythm NSR        Peripheral/Neurovascular WDL    Peripheral Neurovascular WDL WDL     Capillary Refill, General less than/equal to 3 secs        Cognitive/Neuro/Behavioral WDL    Cognitive/Neuro/Behavioral WDL WDL        [place] Coma Scale    Best Eye Response 4-->(E4) spontaneous     Best Motor Response 6-->(M6) obeys commands     Best Verbal Response 5-->(V5) oriented     Christine Coma Scale Score 15                     "

## 2024-04-27 NOTE — ED NOTES
"Staff went into patients room to obtain IV access.  Patient stated to staff, \"I dont think I can do it right now, I need a bit\".  Staff asked patient if they could look for a site and patient states to staff, \"I just need a minute\".  Staff informed patient they will return after patient has spoken to provider.  "

## 2024-04-28 ENCOUNTER — LAB (OUTPATIENT)
Dept: LAB | Facility: OTHER | Age: 29
End: 2024-04-28
Payer: COMMERCIAL

## 2024-04-28 ENCOUNTER — HOSPITAL ENCOUNTER (EMERGENCY)
Facility: OTHER | Age: 29
End: 2024-04-28
Payer: COMMERCIAL

## 2024-04-28 DIAGNOSIS — D50.9 MICROCYTIC ANEMIA: ICD-10-CM

## 2024-04-28 DIAGNOSIS — D50.8 IRON DEFICIENCY ANEMIA SECONDARY TO INADEQUATE DIETARY IRON INTAKE: Primary | ICD-10-CM

## 2024-04-28 DIAGNOSIS — R00.2 PALPITATIONS: ICD-10-CM

## 2024-04-28 LAB
ATRIAL RATE - MUSE: 96 BPM
DIASTOLIC BLOOD PRESSURE - MUSE: NORMAL MMHG
HGB BLD-MCNC: 9.3 G/DL (ref 11.7–15.7)
INTERPRETATION ECG - MUSE: NORMAL
P AXIS - MUSE: 55 DEGREES
PR INTERVAL - MUSE: 150 MS
QRS DURATION - MUSE: 100 MS
QT - MUSE: 372 MS
QTC - MUSE: 469 MS
R AXIS - MUSE: 18 DEGREES
SYSTOLIC BLOOD PRESSURE - MUSE: NORMAL MMHG
T AXIS - MUSE: 25 DEGREES
VENTRICULAR RATE- MUSE: 96 BPM

## 2024-04-28 PROCEDURE — 999N000104 HC STATISTIC NO CHARGE: Performed by: EMERGENCY MEDICINE

## 2024-04-28 PROCEDURE — 36415 COLL VENOUS BLD VENIPUNCTURE: CPT | Mod: ZL

## 2024-04-28 PROCEDURE — 85018 HEMOGLOBIN: CPT | Mod: ZL

## 2024-05-02 PROCEDURE — 999N000104 HC STATISTIC NO CHARGE: Performed by: EMERGENCY MEDICINE

## 2024-05-03 ENCOUNTER — MYC MEDICAL ADVICE (OUTPATIENT)
Dept: FAMILY MEDICINE | Facility: OTHER | Age: 29
End: 2024-05-03
Payer: COMMERCIAL

## 2024-05-04 ENCOUNTER — HEALTH MAINTENANCE LETTER (OUTPATIENT)
Age: 29
End: 2024-05-04

## 2024-05-06 NOTE — TELEPHONE ENCOUNTER
Per ER visit from 4/27:    EKG - NSR with ST elevation. EKG notes occasional PVCs     Simi Godwin is a 29 year old female who presents today with complaints of palpitations.  Patient states that symptoms began at 2 PM today but at the time of history patient no longer having any palpitations.  Has a history of SVT in the past.  Has had an ablation.  Denies any shortness of breath, chest pain or any additional complaints.     No episodes of palpitations noted here.  Patient showed me a rhythm strip captured by her Apple Watch which where suggestive of occasional PVCs    Per ER visit from 5/3/24:    29-year-old female here with palpitations. These have been going on for for 2 or 3 weeks now. They bother her. They make her nervous. They seem to feed on themselves. Prior to her ablation 10 years ago she had similar symptoms and was on metoprolol which helped. She was worked up in the ER over at [facility] and just finished a 7-day event monitor which is being sent in about 1 be read for a few weeks. She has a follow-up in a month. She is wondering if anything can be done in the meantime. She is not having chest pain or shortness of breath.     Medication Sig Dispense Start Date End Date Auth. Provider   metoprolol tartrate (Lopressor) 25 MG tablet Take 0.5 Tablets by mouth two times a day. 30 Tablet 5/3/2024 -- Damion Bar MD     Trial of metoprolol 12.5 mg twice daily. I encouraged her to look into an earlier follow-up

## 2024-05-09 PROCEDURE — 93244 EXT ECG>48HR<7D REV&INTERPJ: CPT | Performed by: INTERNAL MEDICINE

## 2024-05-23 ASSESSMENT — PATIENT HEALTH QUESTIONNAIRE - PHQ9
SUM OF ALL RESPONSES TO PHQ QUESTIONS 1-9: 6
10. IF YOU CHECKED OFF ANY PROBLEMS, HOW DIFFICULT HAVE THESE PROBLEMS MADE IT FOR YOU TO DO YOUR WORK, TAKE CARE OF THINGS AT HOME, OR GET ALONG WITH OTHER PEOPLE: SOMEWHAT DIFFICULT
SUM OF ALL RESPONSES TO PHQ QUESTIONS 1-9: 6

## 2024-05-23 ASSESSMENT — ANXIETY QUESTIONNAIRES
GAD7 TOTAL SCORE: 10
4. TROUBLE RELAXING: SEVERAL DAYS
2. NOT BEING ABLE TO STOP OR CONTROL WORRYING: MORE THAN HALF THE DAYS
8. IF YOU CHECKED OFF ANY PROBLEMS, HOW DIFFICULT HAVE THESE MADE IT FOR YOU TO DO YOUR WORK, TAKE CARE OF THINGS AT HOME, OR GET ALONG WITH OTHER PEOPLE?: SOMEWHAT DIFFICULT
5. BEING SO RESTLESS THAT IT IS HARD TO SIT STILL: NOT AT ALL
7. FEELING AFRAID AS IF SOMETHING AWFUL MIGHT HAPPEN: MORE THAN HALF THE DAYS
1. FEELING NERVOUS, ANXIOUS, OR ON EDGE: MORE THAN HALF THE DAYS
7. FEELING AFRAID AS IF SOMETHING AWFUL MIGHT HAPPEN: MORE THAN HALF THE DAYS
IF YOU CHECKED OFF ANY PROBLEMS ON THIS QUESTIONNAIRE, HOW DIFFICULT HAVE THESE PROBLEMS MADE IT FOR YOU TO DO YOUR WORK, TAKE CARE OF THINGS AT HOME, OR GET ALONG WITH OTHER PEOPLE: SOMEWHAT DIFFICULT
GAD7 TOTAL SCORE: 10
6. BECOMING EASILY ANNOYED OR IRRITABLE: SEVERAL DAYS
GAD7 TOTAL SCORE: 10
3. WORRYING TOO MUCH ABOUT DIFFERENT THINGS: MORE THAN HALF THE DAYS

## 2024-05-28 ENCOUNTER — OFFICE VISIT (OUTPATIENT)
Dept: FAMILY MEDICINE | Facility: OTHER | Age: 29
End: 2024-05-28
Attending: FAMILY MEDICINE
Payer: COMMERCIAL

## 2024-05-28 VITALS
OXYGEN SATURATION: 99 % | TEMPERATURE: 98.2 F | HEIGHT: 62 IN | BODY MASS INDEX: 53.92 KG/M2 | WEIGHT: 293 LBS | SYSTOLIC BLOOD PRESSURE: 138 MMHG | RESPIRATION RATE: 18 BRPM | DIASTOLIC BLOOD PRESSURE: 86 MMHG | HEART RATE: 91 BPM

## 2024-05-28 DIAGNOSIS — R00.2 PALPITATIONS: Primary | ICD-10-CM

## 2024-05-28 DIAGNOSIS — E11.9 TYPE 2 DIABETES MELLITUS WITHOUT COMPLICATION, WITHOUT LONG-TERM CURRENT USE OF INSULIN (H): ICD-10-CM

## 2024-05-28 DIAGNOSIS — E55.9 VITAMIN D DEFICIENCY: ICD-10-CM

## 2024-05-28 LAB
BASOPHILS # BLD AUTO: 0.1 10E3/UL (ref 0–0.2)
BASOPHILS NFR BLD AUTO: 1 %
EOSINOPHIL # BLD AUTO: 0.2 10E3/UL (ref 0–0.7)
EOSINOPHIL NFR BLD AUTO: 2 %
ERYTHROCYTE [DISTWIDTH] IN BLOOD BY AUTOMATED COUNT: 22.1 % (ref 10–15)
HBA1C MFR BLD: 6.4 % (ref 4–6.2)
HCT VFR BLD AUTO: 36.7 % (ref 35–47)
HGB BLD-MCNC: 10.2 G/DL (ref 11.7–15.7)
IMM GRANULOCYTES # BLD: 0.1 10E3/UL
IMM GRANULOCYTES NFR BLD: 1 %
LYMPHOCYTES # BLD AUTO: 2.2 10E3/UL (ref 0.8–5.3)
LYMPHOCYTES NFR BLD AUTO: 29 %
MCH RBC QN AUTO: 18.8 PG (ref 26.5–33)
MCHC RBC AUTO-ENTMCNC: 27.8 G/DL (ref 31.5–36.5)
MCV RBC AUTO: 68 FL (ref 78–100)
MONOCYTES # BLD AUTO: 0.5 10E3/UL (ref 0–1.3)
MONOCYTES NFR BLD AUTO: 7 %
NEUTROPHILS # BLD AUTO: 4.7 10E3/UL (ref 1.6–8.3)
NEUTROPHILS NFR BLD AUTO: 61 %
NRBC # BLD AUTO: 0 10E3/UL
NRBC BLD AUTO-RTO: 0 /100
PLATELET # BLD AUTO: 310 10E3/UL (ref 150–450)
RBC # BLD AUTO: 5.44 10E6/UL (ref 3.8–5.2)
VIT D+METAB SERPL-MCNC: 39 NG/ML (ref 20–50)
WBC # BLD AUTO: 7.7 10E3/UL (ref 4–11)

## 2024-05-28 PROCEDURE — 82306 VITAMIN D 25 HYDROXY: CPT | Mod: ZL | Performed by: FAMILY MEDICINE

## 2024-05-28 PROCEDURE — 99214 OFFICE O/P EST MOD 30 MIN: CPT | Performed by: FAMILY MEDICINE

## 2024-05-28 PROCEDURE — 36415 COLL VENOUS BLD VENIPUNCTURE: CPT | Mod: ZL | Performed by: FAMILY MEDICINE

## 2024-05-28 PROCEDURE — 85025 COMPLETE CBC W/AUTO DIFF WBC: CPT | Mod: ZL | Performed by: FAMILY MEDICINE

## 2024-05-28 PROCEDURE — G0463 HOSPITAL OUTPT CLINIC VISIT: HCPCS

## 2024-05-28 PROCEDURE — 83036 HEMOGLOBIN GLYCOSYLATED A1C: CPT | Mod: ZL | Performed by: FAMILY MEDICINE

## 2024-05-28 RX ORDER — CLOBETASOL PROPIONATE 0.5 MG/ML
SOLUTION TOPICAL
COMMUNITY
Start: 2023-09-06 | End: 2024-05-28

## 2024-05-28 RX ORDER — METOPROLOL SUCCINATE 25 MG/1
25 TABLET, EXTENDED RELEASE ORAL DAILY
Qty: 90 TABLET | Refills: 0 | Status: SHIPPED | OUTPATIENT
Start: 2024-05-28 | End: 2024-05-28

## 2024-05-28 ASSESSMENT — PAIN SCALES - GENERAL: PAINLEVEL: NO PAIN (0)

## 2024-05-28 NOTE — NURSING NOTE
Patient is here to follow up on visit to [facility] ER 4/27 and 5/3 for concerns with palpitations. Still having daily episodes of palpitations.     Previous A1C is at goal of <8  Lab Results   Component Value Date    A1C 6.2 01/16/2024    A1C 6.6 12/09/2022     Urine Microalbumin/Creat:    Albumin Urine mg/L   Date Value Ref Range Status   01/16/2024 <12.0 mg/L Final     Comment:     The reference ranges have not been established in urine albumin. The results should be integrated into the clinical context for interpretation.     Albumin Urine mg/g Cr   Date Value Ref Range Status   01/16/2024   Final     Comment:     Unable to calculate, urine albumin and/or urine creatinine is outside detectable limits.  Microalbuminuria is defined as an albumin:creatinine ratio of 17 to 299 for males and 25 to 299 for females. A ratio of albumin:creatinine of 300 or higher is indicative of overt proteinuria.  Due to biologic variability, positive results should be confirmed by a second, first-morning random or 24-hour timed urine specimen. If there is discrepancy, a third specimen is recommended. When 2 out of 3 results are in the microalbuminuria range, this is evidence for incipient nephropathy and warrants increased efforts at glucose control, blood pressure control, and institution of therapy with an angiotensin-converting-enzyme (ACE) inhibitor (if the patient can tolerate it).       Creatinine Urine   Date Value Ref Range Status   03/27/2019 119 mg/dL Final     Creatinine Urine mg/dL   Date Value Ref Range Status   01/16/2024 80.6 mg/dL Final     Comment:     The reference ranges have not been established in urine creatinine. The results should be integrated into the clinical context for interpretation.     Foot exam 1/2024  Eye exam 1/2024    Tobacco User no, vaping   Patient is not on a daily aspirin  Patient is not on a Statin.  Blood pressure today of:     BP Readings from Last 1 Encounters:   05/28/24 138/86      is at the  goal of <139/89 for diabetics.    Denisse Sweet LPN on 5/28/2024 at 10:13 AM

## 2024-05-28 NOTE — PROGRESS NOTES
"  Assessment & Plan     Palpitations  Reviewed Holter monitor with the patient that showed symptomatic ventricular ectopic beats.  She has a history of ablation many years ago.  Experiencing a fair amount of symptoms with ectopy.  Recommend proceeding with echocardiogram.  Hold off on beta-blocker.  - CBC and Differential; Future  - Echocardiogram Complete; Future  - CBC and Differential    Type 2 diabetes mellitus without complication, without long-term current use of insulin (H)  She has gained most of her weight back.  Would benefit greatly from starting Ozempic.  She will continue to consider this.  - Hemoglobin A1c; Future  - Hemoglobin A1c    Vitamin D deficiency  Decrease vitamin D to 2000 units daily  - Vitamin D Total; Future  - Vitamin D Total        MED REC REQUIRED  Post Medication Reconciliation Status:         No follow-ups on file.    Pk Barragan is a 29 year old, presenting for the following health issues:  Follow Up (ED, labs, palpitations )      30 yo female presents with 1 month history of palpitations.    SVT s/p ablation at age 17    Return of menses with weight loss, flow has been heavy, now has tapered off as weight has come back on, recent labs showed microcytic anemia      History of Present Illness       Diabetes:   She presents for follow up of diabetes.  She is checking home blood glucose a few times a week.   She checks blood glucose before and after meals.  Blood glucose is never over 200 and never under 70. She is aware of hypoglycemia symptoms including shakiness and weakness.   She is concerned about other.   She is having weight gain.                    Review of Systems  Constitutional, HEENT, cardiovascular, pulmonary, gi and gu systems are negative, except as otherwise noted.      Objective    /86   Pulse 91   Temp 98.2  F (36.8  C) (Tympanic)   Resp 18   Ht 1.575 m (5' 2\")   Wt (!) 155.8 kg (343 lb 6.4 oz)   LMP 03/11/2024 (Approximate)   SpO2 99%   " Breastfeeding No   BMI 62.81 kg/m    Body mass index is 62.81 kg/m .  Physical Exam  Cardiovascular:      Rate and Rhythm: Normal rate and regular rhythm.      Heart sounds: No murmur heard.  Pulmonary:      Effort: Pulmonary effort is normal. No respiratory distress.      Breath sounds: No wheezing.   Musculoskeletal:      Cervical back: Normal range of motion.   Neurological:      General: No focal deficit present.      Mental Status: She is alert.   Psychiatric:         Mood and Affect: Mood normal.            Results for orders placed or performed in visit on 05/28/24   Hemoglobin A1c     Status: Abnormal   Result Value Ref Range    Hemoglobin A1C 6.4 (H) 4.0 - 6.2 %   Vitamin D Total     Status: Normal   Result Value Ref Range    Vitamin D, Total (25-Hydroxy) 39 20 - 50 ng/mL    Narrative    Season, race, dietary intake, and treatment affect the concentration of 25-hydroxy-Vitamin D. Values may decrease during winter months and increase during summer months.    Vitamin D determination is routinely performed by an immunoassay specific for 25 hydroxyvitamin D3.  If an individual is on vitamin D2(ergocalciferol) supplementation, please specify 25 OH vitamin D2 and D3 level determination by LCMSMS test VITD23.     CBC with platelets and differential     Status: Abnormal   Result Value Ref Range    WBC Count 7.7 4.0 - 11.0 10e3/uL    RBC Count 5.44 (H) 3.80 - 5.20 10e6/uL    Hemoglobin 10.2 (L) 11.7 - 15.7 g/dL    Hematocrit 36.7 35.0 - 47.0 %    MCV 68 (L) 78 - 100 fL    MCH 18.8 (L) 26.5 - 33.0 pg    MCHC 27.8 (L) 31.5 - 36.5 g/dL    RDW 22.1 (H) 10.0 - 15.0 %    Platelet Count 310 150 - 450 10e3/uL    % Neutrophils 61 %    % Lymphocytes 29 %    % Monocytes 7 %    % Eosinophils 2 %    % Basophils 1 %    % Immature Granulocytes 1 %    NRBCs per 100 WBC 0 <1 /100    Absolute Neutrophils 4.7 1.6 - 8.3 10e3/uL    Absolute Lymphocytes 2.2 0.8 - 5.3 10e3/uL    Absolute Monocytes 0.5 0.0 - 1.3 10e3/uL    Absolute  Eosinophils 0.2 0.0 - 0.7 10e3/uL    Absolute Basophils 0.1 0.0 - 0.2 10e3/uL    Absolute Immature Granulocytes 0.1 <=0.4 10e3/uL    Absolute NRBCs 0.0 10e3/uL   CBC and Differential     Status: Abnormal    Narrative    The following orders were created for panel order CBC and Differential.  Procedure                               Abnormality         Status                     ---------                               -----------         ------                     CBC with platelets and d...[[id number]]  Abnormal            Final result                 Please view results for these tests on the individual orders.           Signed Electronically by: Sho Joya MD

## 2024-05-30 ENCOUNTER — MYC MEDICAL ADVICE (OUTPATIENT)
Dept: FAMILY MEDICINE | Facility: OTHER | Age: 29
End: 2024-05-30
Payer: COMMERCIAL

## 2024-05-30 DIAGNOSIS — E11.9 TYPE 2 DIABETES MELLITUS WITHOUT COMPLICATION, WITHOUT LONG-TERM CURRENT USE OF INSULIN (H): ICD-10-CM

## 2024-05-31 NOTE — TELEPHONE ENCOUNTER
Able to sign Ozempic order?  It was in place but .  Peng'd up.      24  Vitamin D 39    Patient wondering if she should continue on the 50,000 Units of your Vitamin D3 every 7 days or decrease the dose based on your recent labs?    Marcia Downs RN on 2024 at 9:23 AM

## 2024-06-20 ENCOUNTER — HOSPITAL ENCOUNTER (OUTPATIENT)
Dept: CARDIOLOGY | Facility: OTHER | Age: 29
Discharge: HOME | End: 2024-06-20
Attending: FAMILY MEDICINE | Admitting: FAMILY MEDICINE
Payer: COMMERCIAL

## 2024-06-20 DIAGNOSIS — R00.2 PALPITATIONS: ICD-10-CM

## 2024-06-20 LAB — LVEF ECHO: NORMAL

## 2024-06-20 PROCEDURE — 93306 TTE W/DOPPLER COMPLETE: CPT

## 2024-06-20 PROCEDURE — 93306 TTE W/DOPPLER COMPLETE: CPT | Mod: 26 | Performed by: INTERNAL MEDICINE

## 2024-06-27 ASSESSMENT — ANXIETY QUESTIONNAIRES
2. NOT BEING ABLE TO STOP OR CONTROL WORRYING: MORE THAN HALF THE DAYS
8. IF YOU CHECKED OFF ANY PROBLEMS, HOW DIFFICULT HAVE THESE MADE IT FOR YOU TO DO YOUR WORK, TAKE CARE OF THINGS AT HOME, OR GET ALONG WITH OTHER PEOPLE?: SOMEWHAT DIFFICULT
7. FEELING AFRAID AS IF SOMETHING AWFUL MIGHT HAPPEN: NEARLY EVERY DAY
GAD7 TOTAL SCORE: 12
GAD7 TOTAL SCORE: 12
4. TROUBLE RELAXING: SEVERAL DAYS
5. BEING SO RESTLESS THAT IT IS HARD TO SIT STILL: SEVERAL DAYS
IF YOU CHECKED OFF ANY PROBLEMS ON THIS QUESTIONNAIRE, HOW DIFFICULT HAVE THESE PROBLEMS MADE IT FOR YOU TO DO YOUR WORK, TAKE CARE OF THINGS AT HOME, OR GET ALONG WITH OTHER PEOPLE: SOMEWHAT DIFFICULT
1. FEELING NERVOUS, ANXIOUS, OR ON EDGE: MORE THAN HALF THE DAYS
GAD7 TOTAL SCORE: 12
3. WORRYING TOO MUCH ABOUT DIFFERENT THINGS: MORE THAN HALF THE DAYS
7. FEELING AFRAID AS IF SOMETHING AWFUL MIGHT HAPPEN: NEARLY EVERY DAY
6. BECOMING EASILY ANNOYED OR IRRITABLE: SEVERAL DAYS

## 2024-06-27 ASSESSMENT — PATIENT HEALTH QUESTIONNAIRE - PHQ9
SUM OF ALL RESPONSES TO PHQ QUESTIONS 1-9: 7
10. IF YOU CHECKED OFF ANY PROBLEMS, HOW DIFFICULT HAVE THESE PROBLEMS MADE IT FOR YOU TO DO YOUR WORK, TAKE CARE OF THINGS AT HOME, OR GET ALONG WITH OTHER PEOPLE: SOMEWHAT DIFFICULT
SUM OF ALL RESPONSES TO PHQ QUESTIONS 1-9: 7

## 2024-06-28 ENCOUNTER — OFFICE VISIT (OUTPATIENT)
Dept: FAMILY MEDICINE | Facility: OTHER | Age: 29
End: 2024-06-28
Attending: FAMILY MEDICINE
Payer: COMMERCIAL

## 2024-06-28 ENCOUNTER — MYC MEDICAL ADVICE (OUTPATIENT)
Dept: FAMILY MEDICINE | Facility: OTHER | Age: 29
End: 2024-06-28

## 2024-06-28 VITALS
TEMPERATURE: 98.3 F | OXYGEN SATURATION: 98 % | RESPIRATION RATE: 20 BRPM | DIASTOLIC BLOOD PRESSURE: 88 MMHG | SYSTOLIC BLOOD PRESSURE: 136 MMHG | HEART RATE: 79 BPM | HEIGHT: 62 IN | WEIGHT: 293 LBS | BODY MASS INDEX: 53.92 KG/M2

## 2024-06-28 DIAGNOSIS — E66.01 MORBID OBESITY (H): ICD-10-CM

## 2024-06-28 DIAGNOSIS — E11.9 TYPE 2 DIABETES MELLITUS WITHOUT COMPLICATION, WITHOUT LONG-TERM CURRENT USE OF INSULIN (H): Primary | ICD-10-CM

## 2024-06-28 DIAGNOSIS — D64.9 ANEMIA, UNSPECIFIED TYPE: ICD-10-CM

## 2024-06-28 DIAGNOSIS — D50.9 IRON DEFICIENCY ANEMIA, UNSPECIFIED IRON DEFICIENCY ANEMIA TYPE: ICD-10-CM

## 2024-06-28 DIAGNOSIS — I44.1 MOBITZ TYPE 2 SECOND DEGREE HEART BLOCK: ICD-10-CM

## 2024-06-28 LAB
BASOPHILS # BLD AUTO: 0.1 10E3/UL (ref 0–0.2)
BASOPHILS NFR BLD AUTO: 1 %
EOSINOPHIL # BLD AUTO: 0.1 10E3/UL (ref 0–0.7)
EOSINOPHIL NFR BLD AUTO: 2 %
ERYTHROCYTE [DISTWIDTH] IN BLOOD BY AUTOMATED COUNT: 22.3 % (ref 10–15)
FERRITIN SERPL-MCNC: 13 NG/ML (ref 6–175)
HCT VFR BLD AUTO: 36 % (ref 35–47)
HGB BLD-MCNC: 10.2 G/DL (ref 11.7–15.7)
IMM GRANULOCYTES # BLD: 0 10E3/UL
IMM GRANULOCYTES NFR BLD: 0 %
LYMPHOCYTES # BLD AUTO: 2.1 10E3/UL (ref 0.8–5.3)
LYMPHOCYTES NFR BLD AUTO: 25 %
MCH RBC QN AUTO: 19.5 PG (ref 26.5–33)
MCHC RBC AUTO-ENTMCNC: 28.3 G/DL (ref 31.5–36.5)
MCV RBC AUTO: 69 FL (ref 78–100)
MONOCYTES # BLD AUTO: 0.5 10E3/UL (ref 0–1.3)
MONOCYTES NFR BLD AUTO: 6 %
NEUTROPHILS # BLD AUTO: 5.4 10E3/UL (ref 1.6–8.3)
NEUTROPHILS NFR BLD AUTO: 67 %
NRBC # BLD AUTO: 0 10E3/UL
NRBC BLD AUTO-RTO: 0 /100
PLATELET # BLD AUTO: 306 10E3/UL (ref 150–450)
RBC # BLD AUTO: 5.24 10E6/UL (ref 3.8–5.2)
WBC # BLD AUTO: 8.1 10E3/UL (ref 4–11)

## 2024-06-28 PROCEDURE — 99214 OFFICE O/P EST MOD 30 MIN: CPT | Performed by: FAMILY MEDICINE

## 2024-06-28 PROCEDURE — G0463 HOSPITAL OUTPT CLINIC VISIT: HCPCS

## 2024-06-28 PROCEDURE — 85025 COMPLETE CBC W/AUTO DIFF WBC: CPT | Mod: ZL | Performed by: FAMILY MEDICINE

## 2024-06-28 PROCEDURE — 36415 COLL VENOUS BLD VENIPUNCTURE: CPT | Mod: ZL | Performed by: FAMILY MEDICINE

## 2024-06-28 PROCEDURE — 82728 ASSAY OF FERRITIN: CPT | Mod: ZL | Performed by: FAMILY MEDICINE

## 2024-06-28 RX ORDER — FERROUS SULFATE 325(65) MG
325 TABLET, DELAYED RELEASE (ENTERIC COATED) ORAL DAILY
Qty: 90 TABLET | Refills: 0 | Status: SHIPPED | OUTPATIENT
Start: 2024-06-28 | End: 2024-08-02

## 2024-06-28 ASSESSMENT — PAIN SCALES - GENERAL: PAINLEVEL: NO PAIN (0)

## 2024-06-28 NOTE — TELEPHONE ENCOUNTER
Patient has been on 0.25mg of Ozempic.     1mg dose of Ozempic ordered at [facility] today.  (Stuck in system and released to pharmacy)    Dr. Joya,    Were you wanting her to skip the Ozempic 0.5mg dosing for some reason?  If not- inder'd up for signing.      Marcia Downs RN on 6/28/2024 at 2:02 PM

## 2024-06-28 NOTE — PROGRESS NOTES
Assessment & Plan     Type 2 diabetes mellitus without complication, without long-term current use of insulin (H)  Increase ozempic to 1 mg qweek. Reinforced diet and exercise, follow-up vis mychart with weight and side effects in 1 month  - ferrous sulfate (FE TABS) 325 (65 Fe) MG EC tablet; Take 1 tablet (325 mg) by mouth daily Otc okay  - Semaglutide, 1 MG/DOSE, (OZEMPIC) 4 MG/3ML pen; Inject 1 mg Subcutaneous every 7 days    Morbid obesity (H)  As above  - Semaglutide, 1 MG/DOSE, (OZEMPIC) 4 MG/3ML pen; Inject 1 mg Subcutaneous every 7 days    Anemia, unspecified type  Hgb stable,  microcytic anemia. Irregular menses, light, unlikely source. Proceed with UA and heme cards. Continue iron supplement  - CBC and Differential; Future  - Ferritin; Future  - CBC and Differential  - Ferritin    Mobitz type 2 second degree heart block  Refer to cardiology.  Symptomatic ventricular ectopy, avoid b-blockers due to block.    - Adult Cardiology Eval  Referral; Future                No follow-ups on file.    Pk Barragan is a 29 year old, presenting for the following health issues:  Follow Up    28 yo female presents for recheck on palpitation and follow-up of obesity management. Initially seen on 5/3/2024 in ER, scheduled for ziopatch showed symptomatic ventricular beats and 2nd degree AV block.     Palpitations have improved. Echo normal.     LMP 3/2024, also needs follow-up on anemia    Started ozempic, some nausea, no weight loss    History of Present Illness       Reason for visit:  Follow up for heart palputations & one month on ozempic    She eats 0-1 servings of fruits and vegetables daily.She consumes 1 sweetened beverage(s) daily.She exercises with enough effort to increase her heart rate 9 or less minutes per day.  She exercises with enough effort to increase her heart rate 3 or less days per week.   She is taking medications regularly.           Review of Systems  Constitutional, HEENT,  "cardiovascular, pulmonary, gi and gu systems are negative, except as otherwise noted.      Objective    /88   Pulse 79   Temp 98.3  F (36.8  C) (Tympanic)   Resp 20   Ht 1.575 m (5' 2\")   Wt (!) 157.4 kg (347 lb)   LMP 03/11/2024 (Approximate)   SpO2 98%   Breastfeeding No   BMI 63.47 kg/m    Body mass index is 63.47 kg/m .  Physical Exam   GENERAL: alert and no distress  RESP: lungs clear to auscultation - no rales, rhonchi or wheezes  CV: regular rate and rhythm, normal S1 S2, no S3 or S4, no murmur, click or rub, no peripheral edema  NEURO: Normal strength and tone, mentation intact and speech normal    Results for orders placed or performed in visit on 06/28/24   Ferritin     Status: Normal   Result Value Ref Range    Ferritin 13 6 - 175 ng/mL   CBC with platelets and differential     Status: Abnormal   Result Value Ref Range    WBC Count 8.1 4.0 - 11.0 10e3/uL    RBC Count 5.24 (H) 3.80 - 5.20 10e6/uL    Hemoglobin 10.2 (L) 11.7 - 15.7 g/dL    Hematocrit 36.0 35.0 - 47.0 %    MCV 69 (L) 78 - 100 fL    MCH 19.5 (L) 26.5 - 33.0 pg    MCHC 28.3 (L) 31.5 - 36.5 g/dL    RDW 22.3 (H) 10.0 - 15.0 %    Platelet Count 306 150 - 450 10e3/uL    % Neutrophils 67 %    % Lymphocytes 25 %    % Monocytes 6 %    % Eosinophils 2 %    % Basophils 1 %    % Immature Granulocytes 0 %    NRBCs per 100 WBC 0 <1 /100    Absolute Neutrophils 5.4 1.6 - 8.3 10e3/uL    Absolute Lymphocytes 2.1 0.8 - 5.3 10e3/uL    Absolute Monocytes 0.5 0.0 - 1.3 10e3/uL    Absolute Eosinophils 0.1 0.0 - 0.7 10e3/uL    Absolute Basophils 0.1 0.0 - 0.2 10e3/uL    Absolute Immature Granulocytes 0.0 <=0.4 10e3/uL    Absolute NRBCs 0.0 10e3/uL   CBC and Differential     Status: Abnormal    Narrative    The following orders were created for panel order CBC and Differential.  Procedure                               Abnormality         Status                     ---------                               -----------         ------                   "   CBC with platelets and d...[[id number]]  Abnormal            Final result                 Please view results for these tests on the individual orders.           Signed Electronically by: Sho Joya MD

## 2024-06-28 NOTE — NURSING NOTE
Patient is here for a follow up on iron and palpitations.  Is still having palpitations but have decreased and having less often.     Previous A1C is at goal of <8  Lab Results   Component Value Date    A1C 6.4 05/28/2024    A1C 6.2 01/16/2024    A1C 6.6 12/09/2022     Urine Microalbumin/Creat:    Albumin Urine mg/L   Date Value Ref Range Status   01/16/2024 <12.0 mg/L Final     Comment:     The reference ranges have not been established in urine albumin. The results should be integrated into the clinical context for interpretation.     Albumin Urine mg/g Cr   Date Value Ref Range Status   01/16/2024   Final     Comment:     Unable to calculate, urine albumin and/or urine creatinine is outside detectable limits.  Microalbuminuria is defined as an albumin:creatinine ratio of 17 to 299 for males and 25 to 299 for females. A ratio of albumin:creatinine of 300 or higher is indicative of overt proteinuria.  Due to biologic variability, positive results should be confirmed by a second, first-morning random or 24-hour timed urine specimen. If there is discrepancy, a third specimen is recommended. When 2 out of 3 results are in the microalbuminuria range, this is evidence for incipient nephropathy and warrants increased efforts at glucose control, blood pressure control, and institution of therapy with an angiotensin-converting-enzyme (ACE) inhibitor (if the patient can tolerate it).       Creatinine Urine   Date Value Ref Range Status   03/27/2019 119 mg/dL Final     Creatinine Urine mg/dL   Date Value Ref Range Status   01/16/2024 80.6 mg/dL Final     Comment:     The reference ranges have not been established in urine creatinine. The results should be integrated into the clinical context for interpretation.     Foot exam 1/2024  Eye exam 1/2024    Tobacco User vape  Patient is not on a daily aspirin  Patient is not on a Statin.  Blood pressure today of:     BP Readings from Last 1 Encounters:   06/28/24 136/88      is at  the goal of <139/89 for diabetics.    Denisse Sweet LPN on 6/28/2024 at 12:11 PM

## 2024-07-03 ENCOUNTER — LAB (OUTPATIENT)
Dept: LAB | Facility: OTHER | Age: 29
End: 2024-07-03
Payer: COMMERCIAL

## 2024-07-03 DIAGNOSIS — D50.9 IRON DEFICIENCY ANEMIA, UNSPECIFIED IRON DEFICIENCY ANEMIA TYPE: ICD-10-CM

## 2024-07-03 LAB
ALBUMIN UR-MCNC: NEGATIVE MG/DL
APPEARANCE UR: CLEAR
BILIRUB UR QL STRIP: NEGATIVE
COLOR UR AUTO: YELLOW
GLUCOSE UR STRIP-MCNC: NEGATIVE MG/DL
HGB UR QL STRIP: NEGATIVE
KETONES UR STRIP-MCNC: NEGATIVE MG/DL
LEUKOCYTE ESTERASE UR QL STRIP: NEGATIVE
NITRATE UR QL: NEGATIVE
PH UR STRIP: 6.5 [PH] (ref 5–9)
SP GR UR STRIP: 1 (ref 1–1.03)
UROBILINOGEN UR STRIP-MCNC: NORMAL MG/DL

## 2024-07-03 PROCEDURE — 81003 URINALYSIS AUTO W/O SCOPE: CPT | Mod: ZL

## 2024-07-05 ENCOUNTER — LAB (OUTPATIENT)
Dept: LAB | Facility: OTHER | Age: 29
End: 2024-07-05
Attending: FAMILY MEDICINE
Payer: COMMERCIAL

## 2024-07-05 DIAGNOSIS — D50.9 IRON DEFICIENCY ANEMIA, UNSPECIFIED IRON DEFICIENCY ANEMIA TYPE: ICD-10-CM

## 2024-07-05 LAB
HEMOCCULT SP1 STL QL: NEGATIVE
HEMOCCULT SP2 STL QL: NEGATIVE
HEMOCCULT SP3 STL QL: NEGATIVE

## 2024-07-05 PROCEDURE — 82270 OCCULT BLOOD FECES: CPT | Mod: ZL

## 2024-07-09 ENCOUNTER — OFFICE VISIT (OUTPATIENT)
Dept: CARDIOLOGY | Facility: OTHER | Age: 29
End: 2024-07-09
Attending: INTERNAL MEDICINE
Payer: COMMERCIAL

## 2024-07-09 ENCOUNTER — TELEPHONE (OUTPATIENT)
Dept: FAMILY MEDICINE | Facility: OTHER | Age: 29
End: 2024-07-09
Payer: COMMERCIAL

## 2024-07-09 VITALS
RESPIRATION RATE: 16 BRPM | HEART RATE: 84 BPM | BODY MASS INDEX: 62 KG/M2 | OXYGEN SATURATION: 97 % | SYSTOLIC BLOOD PRESSURE: 136 MMHG | TEMPERATURE: 98.2 F | DIASTOLIC BLOOD PRESSURE: 88 MMHG | WEIGHT: 293 LBS

## 2024-07-09 DIAGNOSIS — N92.1 MENORRHAGIA WITH IRREGULAR CYCLE: ICD-10-CM

## 2024-07-09 DIAGNOSIS — R00.2 PALPITATIONS: Primary | ICD-10-CM

## 2024-07-09 PROCEDURE — 99203 OFFICE O/P NEW LOW 30 MIN: CPT | Performed by: INTERNAL MEDICINE

## 2024-07-09 PROCEDURE — 93010 ELECTROCARDIOGRAM REPORT: CPT | Performed by: INTERNAL MEDICINE

## 2024-07-09 PROCEDURE — G0463 HOSPITAL OUTPT CLINIC VISIT: HCPCS

## 2024-07-09 PROCEDURE — 93005 ELECTROCARDIOGRAM TRACING: CPT | Performed by: INTERNAL MEDICINE

## 2024-07-09 ASSESSMENT — PAIN SCALES - GENERAL: PAINLEVEL: NO PAIN (0)

## 2024-07-09 NOTE — PROGRESS NOTES
General Cardiology Clinic-[facility]      Referring provider:Sho Alejandra MD     HPI: Ms. Simi Godwin is a 29 year old  female with PMH significant for    -Iron deficiency anemia  -Morbid obesity with BMI at 63  -Diabetes type 2  -History of cardiac radiofrequency ablation of SVT (AVNRT ablation in [place] in 2012)  -Former smoker quit in 2019    Patient is being seen today to discuss Zio patch findings.  Patient noticed palpitations in April of this year.  She presented to ER on 4/26 after a long day of palpitations.  ER workup was unremarkable.  EKG showed sinus rhythm.  She was given a Zio patch which she wore for 2 weeks.  There was 1 episode of Mobitz type I AV block at 2 AM.  Patient's primary care physician referred to cardiology.    Patient tells me that since April palpitations have actually improved.  They are not as many at this time.  She tells me that she did not have menstrual period for 1-1/2 years.  After starting Ozempic she lost weight and her menstrual periods started.  There was heavy menstrual bleeding.  Her hemoglobin was down to 9.1 in April of this year.  She had stool studies with her primary care physician and she tells me that there was not any abnormal findings.  She did not have any colonoscopy.  She is a former smoker.  Quit in 2018.  Currently she is vaping.  No drug or alcohol abuse.    Patient has iron deficiency anemia over the last 1 year.  In June 2023 patient's hemoglobin was 12.9 but since January 2024 hemoglobin has been declining and it was as low as 9.1 in April of this year.       Medications, personal, family, and social history reviewed with patient and revised.    PAST MEDICAL HISTORY:  Past Medical History:   Diagnosis Date    Obesity complicating pregnancy 09/2016    Polyhydramnios     Supraventricular tachycardia (H24) 2013       CURRENT MEDICATIONS:  Current Outpatient Medications   Medication Sig Dispense Refill     ACCU-CHEK GUIDE test strip USE TO TEST BLOOD SUGAR ONE TIME DAILY 100 strip 3    blood glucose (NO BRAND SPECIFIED) lancets standard Use to test blood sugar 1 time daily. 100 lancet 3    blood glucose monitoring (NO BRAND SPECIFIED) meter device kit Use to test blood sugar 1 times daily. 1 kit 0    ferrous sulfate (FE TABS) 325 (65 Fe) MG EC tablet Take 1 tablet (325 mg) by mouth daily Otc okay 90 tablet 0    ketoconazole (NIZORAL) 2 % external shampoo SHAMPOO INTO THE SCALP DAILY AS NEEDED      omeprazole (PRILOSEC) 20 MG DR capsule Take 20 mg by mouth      semaglutide (OZEMPIC) 2 MG/1.5ML SOPN pen Inject 0.25 mg Subcutaneous every 7 days 1.5 mL 1    semaglutide (OZEMPIC) 2 MG/3ML pen Inject 0.5 mg Subcutaneous every 7 days 3 mL 0    Semaglutide, 1 MG/DOSE, (OZEMPIC) 4 MG/3ML pen Inject 1 mg Subcutaneous every 7 days 3 mL 0    sertraline (ZOLOFT) 50 MG tablet TAKE 1 TABLET(50 MG) BY MOUTH DAILY 90 tablet 3    vitamin D3 (CHOLECALCIFEROL) 1.25 MG (05927 UT) capsule Take 1 capsule (50,000 Units) by mouth every 7 days (Patient not taking: Reported on 2024) 6 capsule 0       PAST SURGICAL HISTORY:  Past Surgical History:   Procedure Laterality Date     SECTION N/A 2019    Procedure:  SECTION;  Surgeon: Tomer Pereira MD;  Location: [facility] OR    [facility] OR CATH ABLATION NON-CARDIAC ENDOVASCULAR NTAP      AVNRT    RELEASE CARPAL TUNNEL Bilateral 2013    TONSILLECTOMY         ALLERGIES:   No Known Allergies    FAMILY HISTORY:  Family History   Problem Relation Age of Onset    Coronary Artery Disease Father 59    Thrombosis Father     Heart Failure Brother 30    Diabetes Maternal Grandfather     Cancer Paternal Grandfather     Breast Cancer No family hx of     Colon Cancer No family hx of          SOCIAL HISTORY:  Social History     Tobacco Use    Smoking status: Former     Current packs/day: 0.00     Average packs/day: 0.3 packs/day for 2.0 years (0.5 ttl pk-yrs)     Types: Vaping Device,  Cigarettes     Start date: 2017     Quit date: 2019     Years since quittin.4     Passive exposure: Never    Smokeless tobacco: Never    Tobacco comments:     1-3 a day   Vaping Use    Vaping status: Every Day    Substances: Nicotine, Flavoring    Devices: Pre-filled or refillable cartridge   Substance Use Topics    Alcohol use: Not Currently     Comment: occasional -1-2 drinks a week    Drug use: No     Comment: Drug use: No       ROS:   Constitutional: No fever, chills, or sweats. Weight stable.   Cardiovascular: As per HPI.       Exam:  /88 (BP Location: Right arm, Patient Position: Sitting, Cuff Size: Adult Large)   Pulse 84   Temp 98.2  F (36.8  C) (Temporal)   Resp 16   Wt (!) 153.8 kg (339 lb)   LMP 2024 (Exact Date)   SpO2 97%   BMI 62.00 kg/m    GENERAL APPEARANCE: alert and no distress  HEENT: no icterus, no central cyanosis  LYMPH/NECK: no adenopathy, no asymmetry, JVP not elevated  RESPIRATORY: lungs clear to auscultation - no rales, rhonchi or wheezes, no use of accessory muscles, no retractions, respirations are unlabored, normal respiratory rate  CARDIOVASCULAR: regular rhythm, normal S1, S2, no S3 or S4 and no murmur, click or rub, precordium quiet with normal PMI.  GI: soft, non tender  EXTREMITIES: no edema  NEURO: alert, normal speech,and affect  SKIN: no ecchymoses, no rashes     I have reviewed the labs and personally reviewed the imaging below and made my comment in the assessment and plan.    Labs:  CBC RESULTS:   Lab Results   Component Value Date    WBC 8.1 2024    WBC 6.8 2020    RBC 5.24 (H) 2024    RBC 5.53 (H) 2020    HGB 10.2 (L) 2024    HGB 13.6 2020    HCT 36.0 2024    HCT 43.0 2020    MCV 69 (L) 2024    MCV 78 2020    MCH 19.5 (L) 2024    MCH 24.6 (L) 2020    MCHC 28.3 (L) 2024    MCHC 31.6 2020    RDW 22.3 (H) 2024    RDW 16.0 (H) 2020      06/28/2024     03/31/2020       BMP RESULTS:  Lab Results   Component Value Date     04/27/2024     04/02/2020    POTASSIUM 3.9 04/27/2024    POTASSIUM 3.8 04/02/2020    CHLORIDE 99 04/27/2024    CHLORIDE 104 04/02/2020    CO2 25 04/27/2024    CO2 26 04/02/2020    ANIONGAP 13 04/27/2024    ANIONGAP 9 04/02/2020     (H) 04/27/2024     04/02/2020    BUN 12.7 04/27/2024    BUN 12 04/02/2020    CR 0.67 04/27/2024    CR 0.62 04/02/2020    GFRESTIMATED >90 04/27/2024    GFRESTIMATED >90 04/02/2020    GFRESTBLACK >90 04/02/2020    LINDA 9.6 04/27/2024    LINDA 9.5 04/02/2020      Echocardiogram 6/20/2024  Left ventricular size, wall motion and function are normal. The ejection  fraction is 60-65%.  Right ventricular function, chamber size, wall motion, and thickness are  normal.  The inferior vena cava was normal in size with preserved respiratory  variability. No pericardial effusion is present.     There is no prior study for direct comparison.    Zio patch 4/27/2024:  The patient was monitored for 6 days, 13 hours.  Minimum HR was 24, average HR was 94, maximum HR was 149.  There were 74 triggered events.  During the patient triggered events, the rhythm was sinus with ectopy.  There were rare supraventricular ectopic beats, longest consisted of single beats.  There were rare ventricular ectopic beats, longest consisted of single beats.  Second-degree AV block Mobitz type I Wenckebach was present     Impression:  Cardiac event monitor revealing symptomatic ventricular ectopic beats.          Assessment :     # Mobitz type I AV block during sleeping hours  -Likely due to high vagal tone.    -Sleep study to rule out sleep apnea    # Palpitations  - Zio patch: Low PAC and low PVC burden corresponding to symptoms  # Morbid obesity  # Severe anemia with hemoglobin 9.1 in April 2024 likely secondary to heavy menstrual bleeding  -Patient had severe iron deficiency anemia in April of this year which  is likely the cause of palpitations.  Since then she is on iron replacement and hemoglobin has improved to 10.2 and palpitations has improved as well.  She has no other concerning symptoms at this time.  Physical exam is benign except she is morbidly obese and probably she has sleep apnea.  She tells me that she sleeps in recliner.    Plan:  -Recommend to see OB/GYN given severe anemia from menstrual bleeding to rule out other causes.  Placed OB/GYN consult today  -Sleep study rule out sleep apnea    Return to clinic as needed.    Total time spent today for this visit is 39 minutes including precharting, face-to-face clinic visit, review of labs/imaging and medical documentation.    Kaelyn DICK MD  [facility] Division of Cardiology  Pager [phone]

## 2024-07-09 NOTE — TELEPHONE ENCOUNTER
Spoke to patient will move appt to 8/2 at 1040.  Denisse Sweet LPN .............7/9/2024     12:07 PM

## 2024-07-09 NOTE — NURSING NOTE
"Chief Complaint   Patient presents with    New Patient     Referral 6/28/2024 2nd degree heart block        Initial /88 (BP Location: Right arm, Patient Position: Sitting, Cuff Size: Adult Large)   Pulse 84   Temp 98.2  F (36.8  C) (Temporal)   Resp 16   Wt (!) 153.8 kg (339 lb)   LMP 07/04/2024 (Exact Date)   SpO2 97%   BMI 62.00 kg/m   Estimated body mass index is 62 kg/m  as calculated from the following:    Height as of 6/28/24: 1.575 m (5' 2\").    Weight as of this encounter: 153.8 kg (339 lb).  Meds Reconciled: complete  Pt is not on Aspirin  Pt is not on a Statin  Pt is not on Xarelto or Eliquis  Pt is not on a Warfarin   PHQ and/or GINO reviewed. Pt referred to PCP/MH Provider as appropriate.    Hortencia Husain LPN         "

## 2024-07-09 NOTE — TELEPHONE ENCOUNTER
The patient had an appointment scheduled with Dr Joya on 7/30/2024.  Dr Joya will now not be there that day.  Next appointment availability is on 8/22/2024.  The patient stated that Dr Joya wanted to see her right at the one month yessi for her weight issues so the patient asked me to see if Dr Joya would find a place to fit her in in July . Please let the patient know .

## 2024-07-10 LAB
ATRIAL RATE - MUSE: 80 BPM
DIASTOLIC BLOOD PRESSURE - MUSE: NORMAL MMHG
INTERPRETATION ECG - MUSE: NORMAL
P AXIS - MUSE: 67 DEGREES
PR INTERVAL - MUSE: 150 MS
QRS DURATION - MUSE: 100 MS
QT - MUSE: 360 MS
QTC - MUSE: 415 MS
R AXIS - MUSE: 22 DEGREES
SYSTOLIC BLOOD PRESSURE - MUSE: NORMAL MMHG
T AXIS - MUSE: 46 DEGREES
VENTRICULAR RATE- MUSE: 80 BPM

## 2024-07-12 ENCOUNTER — MYC MEDICAL ADVICE (OUTPATIENT)
Dept: PULMONOLOGY | Facility: OTHER | Age: 29
End: 2024-07-12

## 2024-07-23 ASSESSMENT — SLEEP AND FATIGUE QUESTIONNAIRES
HOW LIKELY ARE YOU TO NOD OFF OR FALL ASLEEP WHILE SITTING AND TALKING TO SOMEONE: WOULD NEVER DOZE
HOW LIKELY ARE YOU TO NOD OFF OR FALL ASLEEP WHILE SITTING QUIETLY AFTER LUNCH WITHOUT ALCOHOL: WOULD NEVER DOZE
HOW LIKELY ARE YOU TO NOD OFF OR FALL ASLEEP WHILE LYING DOWN TO REST IN THE AFTERNOON WHEN CIRCUMSTANCES PERMIT: MODERATE CHANCE OF DOZING
HOW LIKELY ARE YOU TO NOD OFF OR FALL ASLEEP WHILE WATCHING TV: WOULD NEVER DOZE
HOW LIKELY ARE YOU TO NOD OFF OR FALL ASLEEP WHILE SITTING INACTIVE IN A PUBLIC PLACE: WOULD NEVER DOZE
HOW LIKELY ARE YOU TO NOD OFF OR FALL ASLEEP IN A CAR, WHILE STOPPED FOR A FEW MINUTES IN TRAFFIC: WOULD NEVER DOZE
HOW LIKELY ARE YOU TO NOD OFF OR FALL ASLEEP WHEN YOU ARE A PASSENGER IN A CAR FOR AN HOUR WITHOUT A BREAK: SLIGHT CHANCE OF DOZING
HOW LIKELY ARE YOU TO NOD OFF OR FALL ASLEEP WHILE SITTING AND READING: WOULD NEVER DOZE

## 2024-07-23 NOTE — TELEPHONE ENCOUNTER
Prescription approved per [facility] Refill Protocol.    Signed Prescriptions Disp Refills    semaglutide (OZEMPIC) 2 MG/3ML pen 3 mL 0     Sig: Inject 0.5 mg Subcutaneous every 7 days       GLP-1 Agonists Protocol Passed - 6/28/2024  2:07 PM        Passed - HgbA1C in past 3 or 6 months     If HgbA1C is 8 or greater, it needs to be on file within the past 3 months.  If less than 8, must be on file within the past 6 months.     Recent Labs   Lab Test 05/28/24  1058 12/09/22  1111 09/06/16  2108   A1C 6.4*   < >  --    [id number]  --   --  5.4    < > = values in this interval not displayed.             Passed - Medication is active on med list        Passed - Has GFR on file in past 12 months and most recent value is normal        Passed - Recent (6 mo) or future (90 days) visit within the authorizing provider's specialty     The patient must have completed an in-person or virtual visit within the past 6 months or has a future visit scheduled within the next 90 days with the authorizing provider s specialty.  Urgent care and e-visits do not quality as an office visit for this protocol.          Passed - Medication indicated for associated diagnosis     Medication is associated with one or more of the following diagnoses:     Type 2 diabetes mellitus           Passed - Patient is age 18 or older        Passed - No active pregnancy on record        Passed - No positive pregnancy test in past 12 months           Last Prescription Date:   6/28/24  Last Fill Qty/Refills:         3, R-0    Last Office Visit:              6/28/2024   Future Office visit:           8/2/2024    Next 5 appointments (look out 90 days)      Aug 02, 2024 10:40 AM  (Arrive by 10:25 AM)  Provider Visit with Sho Guzman MD  [facility] ([facility] ) [address]  [facility] [id number] [phone]     Marcia Downs RN on 7/23/2024 at 12:14 PM

## 2024-07-23 NOTE — PROGRESS NOTES
07/23/24 1211   Reason For Your Visit   Please briefly describe the main reason(s) for your sleep visit Cardiologist recommended   Approximately when did this problem start ~2-3 years ago   What are your goals for this visit Not sure   Time in Bed - Work Or School Days   Do you work or go to school Yes   What time do you usually get into bed 12 am   About how long does it take you to fall asleep 10-20 mins   How often do you have trouble falling asleep Maybe 2   How often do you wake up during the night Varies per night. Sometimes none sometimes 2-3   Do you work days/evenings/nights/rotating shifts Days;Evenings   What wakes you up at night Pain;Use the bathroom   How often do you have trouble falling back to sleep 0   About how long does it take to fall back to sleep Minutes   What do you usually do if you have trouble getting back to sleep Count backwards   What time do you usually get out of bed to start your day 9:30 am   Do you use an alarm Yes   Time in Bed - Weekends/Non-work Days/All Other Days   What time do you usually get into bed 11pm-1am   About how long does it take you to fall asleep 10-20 mins   What time do you usually get out of bed to start your day 9:30 am   Do you use an alarm Yes   Sleep Need   On average, about how much sleep do you think you get 6-8 hrs   About how much sleep do you think you need 8-10 hrs   Sleep Position   Which sleep positions do you prefer Back;Side;Head Elevated;Recliner   Do you do any of the following activities in bed Watch TV;Use phone, computer, or tablet   How often do you take a nap on purpose 5   About how long are your naps 1-3 hrs   Do you feel better after naps Yes   How often do you doze off unintentionally Never   Have you ever had a driving accident or near-miss due to sleepiness/drowsiness No   Sleep Disruptions - Breathing/Snoring   Do you snore Yes   Do other people complain about your snoring Yes   Have you been told you stop breathing in your sleep  No   Do you have issues with any of the following Morning mouth dryness;Getting up to urinate more than once   Sleep Disruptions - Movement   Do you get pain, discomfort, with an urge to move No   Does it happen when you are resting No   Does it get better if you move around No   Does it happen more at night No   Have you been told you kick your legs at night No   Sleep Disruptions - Behaviours in Sleep   Do you ever experience sudden muscle weakness during the day No   Caffeine, Alcohol and Other Substances   How many caffeinated beverages (coffee, tea, soda, energy drinks) per day 1   What time of day is your last caffeine use 5pm   List any prescribed or over the counter stimulants that you take None   List any prescribed or over the counter sleep medication you take None   List previous sleep medications you have tried None   Do you drink alcohol to help you sleep No   Do you drink alcohol near bedtime No   Family History   Has any family member been diagnosed with a sleep disorder Yes   If yes, please indicate Father   In the last TWO WEEKS have you experienced any of the following symptoms?   Fevers No   Night Sweats No   Weight Gain No   Pain at Night No   Double Vision No   Changes in Vision No   Difficulty Breathing through Nose Yes   Sore Throat in Morning Yes   Dry Mouth in the Morning Yes   Shortness of Breath Lying Flat No   Shortness of Breath With Activity Yes   Awakening with Shortness of Breath Yes   Increased Cough No   Heart Racing at Night No   Swelling in Feet or Legs No   Diarrhea at Night No   Heartburn at Night No   Urinating More than Once at Night Yes   Losing Control of Urine at Night No   Joint Pains at Night No   Headaches in Morning No   Weakness in Arms or Legs No   Depressed Mood Yes   Anxiety Yes         7/23/2024    12:12 PM    Darwin Sleepiness Scale ( PINO Saleh  [id number]<br>ESS - [place]/English - Final version - 21 Nov 07 - [facility] [place].)   Sitting and reading Would  never doze   Watching TV Would never doze   Sitting, inactive in a public place (e.g. a theatre or a meeting) Would never doze   As a passenger in a car for an hour without a break Slight chance of dozing   Lying down to rest in the afternoon when circumstances permit Moderate chance of dozing   Sitting and talking to someone Would never doze   Sitting quietly after a lunch without alcohol Would never doze   In a car, while stopped for a few minutes in traffic Would never doze   [place] Score (MC) 3   [place] Score (Sleep) 3         7/23/2024    12:05 PM   Insomnia Severity Index (HAKEEM)   Difficulty falling asleep 1   Difficulty staying asleep 1   Problems waking up too early 0   How SATISFIED/DISSATISFIED are you with your CURRENT sleep pattern? 2   How NOTICEABLE to others do you think your sleep problem is in terms of impairing the quality of your life? 1   How WORRIED/DISTRESSED are you about your current sleep problem? 1   To what extent do you consider your sleep problem to INTERFERE with your daily functioning (e.g. daytime fatigue, mood, ability to function at work/daily chores, concentration, memory, mood, etc.) CURRENTLY? 2   HAKEEM Total Score 8         7/23/2024    12:11 PM   STOP BANG Questionnaire (  2008, the American Society of Anesthesiologists, Inc. [employer]   1. Snoring - Do you snore loudly (louder than talking or loud enough to be heard through closed doors)? Yes   2. Tired - Do you often feel tired, fatigued, or sleepy during daytime? Yes   3. Observed - Has anyone observed you stop breathing during your sleep? No   4. Blood pressure - Do you have or are you being treated for high blood pressure? No   5. BMI - BMI more than 35 kg/m2? Yes   6. Age - Age over 50 yr old? No   7. Neck circumference - Neck circumference greater than 40 cm? Yes   8. Gender - Gender male? No   STOP BANG Score (MC): 5 (High risk of LEOLA)

## 2024-08-01 NOTE — TELEPHONE ENCOUNTER
Chart review prior to sleep testing.    Patient Summary:  29 year old female who is referred for sleep disordered breathing.    Patient Active Problem List    Diagnosis Date Noted    Diabetes mellitus, type 2 (H) 12/15/2022     Priority: Medium    History of cardiac radiofrequency ablation for SVT 2020     Priority: Medium    H/O supraventricular tachycardia 2020     Priority: Medium    S/P  section 2019     Priority: Medium    History of  2018     Priority: Medium    Nicotine use disorder 2018     Priority: Medium    BMI 50.0-59.9, adult (H) 2016     Priority: Medium       Current Outpatient Medications   Medication Sig Dispense Refill    ACCU-CHEK GUIDE test strip USE TO TEST BLOOD SUGAR ONE TIME DAILY 100 strip 3    blood glucose (NO BRAND SPECIFIED) lancets standard Use to test blood sugar 1 time daily. 100 lancet 3    blood glucose monitoring (NO BRAND SPECIFIED) meter device kit Use to test blood sugar 1 times daily. 1 kit 0    ferrous sulfate (FE TABS) 325 (65 Fe) MG EC tablet Take 1 tablet (325 mg) by mouth daily Otc okay 90 tablet 0    ketoconazole (NIZORAL) 2 % external shampoo SHAMPOO INTO THE SCALP DAILY AS NEEDED      omeprazole (PRILOSEC) 20 MG DR capsule Take 20 mg by mouth      semaglutide (OZEMPIC) 2 MG/1.5ML SOPN pen Inject 0.25 mg Subcutaneous every 7 days 1.5 mL 1    semaglutide (OZEMPIC) 2 MG/3ML pen Inject 0.5 mg Subcutaneous every 7 days 3 mL 0    Semaglutide, 1 MG/DOSE, (OZEMPIC) 4 MG/3ML pen Inject 1 mg subcutaneously every 7 days 3 mL 0    sertraline (ZOLOFT) 50 MG tablet TAKE 1 TABLET(50 MG) BY MOUTH DAILY 90 tablet 3    vitamin D3 (CHOLECALCIFEROL) 1.25 MG (07705 UT) capsule Take 1 capsule (50,000 Units) by mouth every 7 days (Patient not taking: Reported on 2024) 6 capsule 0     No current facility-administered medications for this visit.       Pertinent PMHx of type 2 diabetes supraventricular tachycardia, severe obesity (BMI  "~62).    Most recent echocardiogram on 6/20/2024.  Normal LVEF of 60-65%, normal right ventricular size and function.    No prior blood gases for review.    STOP-BANG score of 5, with unknown neck circumference.  [place] score of 3.  HAKEEM: 8    BMI of Estimated body mass index is 62 kg/m  as calculated from the following:    Height as of 6/28/24: 1.575 m (5' 2\").    Weight as of 7/9/24: 153.8 kg (339 lb).     Chief concern per questionnaire: \"Cardiologist recommended \"    Duration of symptoms:  \"~2-3 years ago \"    Goals for visit per questionnaire: \"Not sure\"    Sleep pattern:  Workdays.  Midnight-9:30 AM.  Weekends.  11 PM-1 AM until 9:30 AM.  Time to fall asleep: ~10-20 minutes.  Awakenings: 0-3 times per night, few minutes to return to sleep.  Average total sleep time:  6-8 hours  Napping.  5 days per week, 1-3 hours per nap.    Yes for TV, phone in bed.    No for RLS screen.  No for sleep walking.  No for dream enactment behavior.  No for bruxism.    No for morning headaches.  Yes for snoring.  No for observed apnea.  Yes for FHx of LEOLA - father.    Caffeine use:  No for 3+ per day.  No for within 6 hours of bed.    A/P:    1.)  High likelihood of LEOLA with STOP-BANG score of 5.  2.)  Increased risk for hypoventilation with a BMI of 62    Recommend diagnostic in-lab PSG with TCM, along with prestudy VBG.    ---  This note was written with the assistance of the Dragon voice-dictation technology software. The final document, although reviewed, may contain errors. For corrections, please contact the office.    Damion Marshall MD    Sleep Medicine  [facility]  - [place], [place]  Main Office: [phone]  [facility] [place]  [address] [place] [address], [id number]  Schedule visits: [phone]  Main Office: [phone]  Fax: [phone]    "

## 2024-08-02 ENCOUNTER — OFFICE VISIT (OUTPATIENT)
Dept: FAMILY MEDICINE | Facility: OTHER | Age: 29
End: 2024-08-02
Attending: FAMILY MEDICINE
Payer: COMMERCIAL

## 2024-08-02 VITALS
OXYGEN SATURATION: 98 % | RESPIRATION RATE: 20 BRPM | HEART RATE: 90 BPM | SYSTOLIC BLOOD PRESSURE: 130 MMHG | TEMPERATURE: 98.3 F | WEIGHT: 293 LBS | DIASTOLIC BLOOD PRESSURE: 74 MMHG | HEIGHT: 62 IN | BODY MASS INDEX: 53.92 KG/M2

## 2024-08-02 DIAGNOSIS — E11.9 TYPE 2 DIABETES MELLITUS WITHOUT COMPLICATION, WITHOUT LONG-TERM CURRENT USE OF INSULIN (H): Primary | ICD-10-CM

## 2024-08-02 DIAGNOSIS — D50.0 IRON DEFICIENCY ANEMIA DUE TO CHRONIC BLOOD LOSS: ICD-10-CM

## 2024-08-02 DIAGNOSIS — E66.01 MORBID OBESITY (H): ICD-10-CM

## 2024-08-02 LAB
BASOPHILS # BLD AUTO: 0 10E3/UL (ref 0–0.2)
BASOPHILS NFR BLD AUTO: 0 %
EOSINOPHIL # BLD AUTO: 0.1 10E3/UL (ref 0–0.7)
EOSINOPHIL NFR BLD AUTO: 2 %
ERYTHROCYTE [DISTWIDTH] IN BLOOD BY AUTOMATED COUNT: 22.5 % (ref 10–15)
FERRITIN SERPL-MCNC: 14 NG/ML (ref 6–175)
HCT VFR BLD AUTO: 40.5 % (ref 35–47)
HGB BLD-MCNC: 11.9 G/DL (ref 11.7–15.7)
IMM GRANULOCYTES # BLD: 0 10E3/UL
IMM GRANULOCYTES NFR BLD: 0 %
LYMPHOCYTES # BLD AUTO: 2.1 10E3/UL (ref 0.8–5.3)
LYMPHOCYTES NFR BLD AUTO: 30 %
MCH RBC QN AUTO: 20.9 PG (ref 26.5–33)
MCHC RBC AUTO-ENTMCNC: 29.4 G/DL (ref 31.5–36.5)
MCV RBC AUTO: 71 FL (ref 78–100)
MONOCYTES # BLD AUTO: 0.4 10E3/UL (ref 0–1.3)
MONOCYTES NFR BLD AUTO: 6 %
NEUTROPHILS # BLD AUTO: 4.3 10E3/UL (ref 1.6–8.3)
NEUTROPHILS NFR BLD AUTO: 62 %
NRBC # BLD AUTO: 0 10E3/UL
NRBC BLD AUTO-RTO: 0 /100
PLATELET # BLD AUTO: 300 10E3/UL (ref 150–450)
RBC # BLD AUTO: 5.69 10E6/UL (ref 3.8–5.2)
WBC # BLD AUTO: 7 10E3/UL (ref 4–11)

## 2024-08-02 PROCEDURE — G0463 HOSPITAL OUTPT CLINIC VISIT: HCPCS

## 2024-08-02 PROCEDURE — 99214 OFFICE O/P EST MOD 30 MIN: CPT | Performed by: FAMILY MEDICINE

## 2024-08-02 PROCEDURE — 85025 COMPLETE CBC W/AUTO DIFF WBC: CPT | Mod: ZL | Performed by: FAMILY MEDICINE

## 2024-08-02 PROCEDURE — 36415 COLL VENOUS BLD VENIPUNCTURE: CPT | Mod: ZL | Performed by: FAMILY MEDICINE

## 2024-08-02 PROCEDURE — 82728 ASSAY OF FERRITIN: CPT | Mod: ZL | Performed by: FAMILY MEDICINE

## 2024-08-02 RX ORDER — FERROUS SULFATE 325(65) MG
325 TABLET, DELAYED RELEASE (ENTERIC COATED) ORAL DAILY
Qty: 90 TABLET | Refills: 4 | Status: SHIPPED | OUTPATIENT
Start: 2024-08-02

## 2024-08-02 ASSESSMENT — PAIN SCALES - GENERAL: PAINLEVEL: NO PAIN (0)

## 2024-08-02 NOTE — NURSING NOTE
Patient is here for medication check for ozempic. No concerns at this time.    Previous A1C is at goal of <8  Lab Results   Component Value Date    A1C 6.4 05/28/2024    A1C 6.2 01/16/2024    A1C 6.6 12/09/2022     Urine Microalbumin/Creat:    Albumin Urine mg/L   Date Value Ref Range Status   01/16/2024 <12.0 mg/L Final     Comment:     The reference ranges have not been established in urine albumin. The results should be integrated into the clinical context for interpretation.     Albumin Urine mg/g Cr   Date Value Ref Range Status   01/16/2024   Final     Comment:     Unable to calculate, urine albumin and/or urine creatinine is outside detectable limits.  Microalbuminuria is defined as an albumin:creatinine ratio of 17 to 299 for males and 25 to 299 for females. A ratio of albumin:creatinine of 300 or higher is indicative of overt proteinuria.  Due to biologic variability, positive results should be confirmed by a second, first-morning random or 24-hour timed urine specimen. If there is discrepancy, a third specimen is recommended. When 2 out of 3 results are in the microalbuminuria range, this is evidence for incipient nephropathy and warrants increased efforts at glucose control, blood pressure control, and institution of therapy with an angiotensin-converting-enzyme (ACE) inhibitor (if the patient can tolerate it).       Creatinine Urine   Date Value Ref Range Status   03/27/2019 119 mg/dL Final     Creatinine Urine mg/dL   Date Value Ref Range Status   01/16/2024 80.6 mg/dL Final     Comment:     The reference ranges have not been established in urine creatinine. The results should be integrated into the clinical context for interpretation.     Foot exam 1/2024  Eye exam 1/2024    Tobacco User no  Patient is not on a daily aspirin  Patient is not on a Statin.  Blood pressure today of:     BP Readings from Last 1 Encounters:   08/02/24 130/74      is at the goal of <139/89 for diabetics.    Denisse Sweet,  LPN on 8/2/2024 at 10:33 AM

## 2024-08-02 NOTE — PROGRESS NOTES
"  Assessment & Plan     Type 2 diabetes mellitus without complication, without long-term current use of insulin (H)    A1c is within goal.  She will continue on Ozempic 1 mg weekly.  She has had substantial weight loss and is tolerating this dosage well.  Declines titration.    Morbid obesity (H)    - Semaglutide, 1 MG/DOSE, (OZEMPIC) 4 MG/3ML pen; Inject 1 mg subcutaneously every 7 days    Iron deficiency anemia due to chronic blood loss  Hemoglobin is now normal.  She now has a light predictable menses.  Additional workup included negative urinalysis, Hemoccult cards were negative.  Repeat CBC and iron studies pending.  Do not think she needs a referral to OB/GYN.  - CBC and Differential; Future  - Ferritin; Future  - CBC and Differential  - Ferritin          BMI  Estimated body mass index is 62.08 kg/m  as calculated from the following:    Height as of this encounter: 1.575 m (5' 2\").    Weight as of this encounter: 154 kg (339 lb 6.4 oz).   Weight management plan: Discussed healthy diet and exercise guidelines      There are no Patient Instructions on file for this visit.    No follow-ups on file.    Pk Barragan is a 29 year old, presenting for the following health issues:  Recheck Medication (Weight /DM) and Diabetes       30 yo female presents for recheck on weight loss medication    Irregular menses, q50 days, flow is moderate    Was recently seen by cardiology due to abnormal Zio patch.  Palpitations  have nearly resolved.    Patient was referred to OB/GYN from cardiology due to concerns of iron deficiency anemia.  The patient has regular predictable menses.  She did have a few months of heavy flow when her menses first resumed.  She had been amenorrheic for many years due to PCOS/insulin resistance.    She has lost 13 pounds in the last month.  Tolerating the current dose of Ozempic.  History of Present Illness       She eats 2-3 servings of fruits and vegetables daily.She consumes 1 sweetened " "beverage(s) daily.She exercises with enough effort to increase her heart rate 9 or less minutes per day.  She exercises with enough effort to increase her heart rate 3 or less days per week.   She is taking medications regularly.                 Review of Systems  Constitutional, HEENT, cardiovascular, pulmonary, gi and gu systems are negative, except as otherwise noted.      Objective    /74   Pulse 90   Temp 98.3  F (36.8  C) (Tympanic)   Resp 20   Ht 1.575 m (5' 2\")   Wt (!) 154 kg (339 lb 6.4 oz)   LMP 07/04/2024 (Exact Date)   SpO2 98%   Breastfeeding No   BMI 62.08 kg/m    Body mass index is 62.08 kg/m .  Physical Exam   GENERAL: alert and no distress  RESP: lungs clear to auscultation - no rales, rhonchi or wheezes  CV: regular rate and rhythm, normal S1 S2, no S3 or S4, no murmur, click or rub, no peripheral edema    Results for orders placed or performed in visit on 08/02/24 (from the past 24 hour(s))   CBC and Differential    Narrative    The following orders were created for panel order CBC and Differential.  Procedure                               Abnormality         Status                     ---------                               -----------         ------                     CBC with platelets and d...[[id number]]  Abnormal            Final result                 Please view results for these tests on the individual orders.   Ferritin   Result Value Ref Range    Ferritin 14 6 - 175 ng/mL   CBC with platelets and differential   Result Value Ref Range    WBC Count 7.0 4.0 - 11.0 10e3/uL    RBC Count 5.69 (H) 3.80 - 5.20 10e6/uL    Hemoglobin 11.9 11.7 - 15.7 g/dL    Hematocrit 40.5 35.0 - 47.0 %    MCV 71 (L) 78 - 100 fL    MCH 20.9 (L) 26.5 - 33.0 pg    MCHC 29.4 (L) 31.5 - 36.5 g/dL    RDW 22.5 (H) 10.0 - 15.0 %    Platelet Count 300 150 - 450 10e3/uL    % Neutrophils 62 %    % Lymphocytes 30 %    % Monocytes 6 %    % Eosinophils 2 %    % Basophils 0 %    % Immature Granulocytes 0 %    " NRBCs per 100 WBC 0 <1 /100    Absolute Neutrophils 4.3 1.6 - 8.3 10e3/uL    Absolute Lymphocytes 2.1 0.8 - 5.3 10e3/uL    Absolute Monocytes 0.4 0.0 - 1.3 10e3/uL    Absolute Eosinophils 0.1 0.0 - 0.7 10e3/uL    Absolute Basophils 0.0 0.0 - 0.2 10e3/uL    Absolute Immature Granulocytes 0.0 <=0.4 10e3/uL    Absolute NRBCs 0.0 10e3/uL           Signed Electronically by: Sho Joya MD

## 2024-08-06 DIAGNOSIS — R06.81 APNEA: ICD-10-CM

## 2024-08-06 DIAGNOSIS — I44.1 MOBITZ TYPE II ATRIOVENTRICULAR BLOCK: Primary | ICD-10-CM

## 2024-08-06 DIAGNOSIS — R09.02 HYPOXEMIA: ICD-10-CM

## 2024-08-20 ENCOUNTER — THERAPY VISIT (OUTPATIENT)
Dept: SLEEP MEDICINE | Facility: OTHER | Age: 29
End: 2024-08-20
Attending: FAMILY MEDICINE
Payer: COMMERCIAL

## 2024-08-20 DIAGNOSIS — I44.1 MOBITZ TYPE II ATRIOVENTRICULAR BLOCK: ICD-10-CM

## 2024-08-20 DIAGNOSIS — R06.81 APNEA: ICD-10-CM

## 2024-08-20 DIAGNOSIS — R09.02 HYPOXEMIA: ICD-10-CM

## 2024-08-20 LAB
BASE EXCESS BLDV CALC-SCNC: 3 MMOL/L (ref -3–3)
HCO3 BLDV-SCNC: 29 MMOL/L (ref 21–28)
O2/TOTAL GAS SETTING VFR VENT: 21 %
OXYHGB MFR BLDV: 68 % (ref 70–75)
PCO2 BLDV: 48 MM HG (ref 40–50)
PH BLDV: 7.39 [PH] (ref 7.32–7.43)
PO2 BLDV: 33 MM HG (ref 25–47)
SAO2 % BLDV: 69.3 % (ref 70–75)

## 2024-08-20 PROCEDURE — 36415 COLL VENOUS BLD VENIPUNCTURE: CPT

## 2024-08-20 PROCEDURE — 82805 BLOOD GASES W/O2 SATURATION: CPT

## 2024-08-20 PROCEDURE — 95810 POLYSOM 6/> YRS 4/> PARAM: CPT

## 2024-08-20 PROCEDURE — 95810 POLYSOM 6/> YRS 4/> PARAM: CPT | Mod: 26 | Performed by: FAMILY MEDICINE

## 2024-08-22 DIAGNOSIS — N92.0 MENORRHAGIA: Primary | ICD-10-CM

## 2024-08-22 NOTE — PROGRESS NOTES
Patient is heere with a concern for sleep apnea and hypoventilation. All stages of sleep were observed with delayed REM.efficiency was 77.8. there was moderate to loud snoring and no respiratory events with low SPO2 of 85 and 0.7 minutes below 89% patient slept with head of the bed elevated to 45% and two pillows. CO2 was not monitored . There were some leg movements with an index of 1.5. Patient tolerated test well.

## 2024-08-23 NOTE — PROCEDURES
"[facility]   SLEEP STUDY INTERPRETATION  DIAGNOSTIC POLYSOMNOGRAPHY REPORT      Patient: COREY CRUZ  YOB: 1995  Study Date: 8/20/2024  MRN: [id number]  Referring Provider: Megan Joya MD  Ordering Provider: Damion Marshall MD    Indications for Polysomnography: The patient is a 29 year old Female who is 5' 2\" and weighs 339.0 lbs. Her BMI is 62.4, [place] sleepiness scale 3 and neck circumference is - cm. Relevant medical history includes type 2 diabetes supraventricular tachycardia, severe obesity (BMI ~62). A diagnostic polysomnogram was performed to evaluate for sleep apnea/hypoventilation/hypoxemia.    Polysomnogram Data: A full night polysomnogram recorded the standard physiologic parameters including EEG, EOG, EMG, ECG, nasal and oral airflow. Respiratory parameters of chest and abdominal movements were recorded with respiratory inductance plethysmography. Oxygen saturation was recorded by pulse oximetry. Hypopnea scoring rule used: 1B 4%.    Sleep Architecture: Delayed sleep onset and REM latency, decreased percentages of N3 and REM, no supine REM observed.  Increased arousal index.  The total recording time of the polysomnogram was 458.3 minutes. The total sleep time was 356.5 minutes. Sleep latency was delayed at 84.5 minutes without the use of a sleep aid. REM latency was 243.5 minutes. Arousal index was increased at 20.2 arousals per hour. Sleep efficiency was decreased at 77.8%. Wake after sleep onset was 17.5 minutes. The patient spent 2.7% of total sleep time in Stage N1, 85.8% in Stage N2, 1.7% in Stage N3, and 9.8% in REM. Time in REM supine was - minutes.    Respiration: No sleep-disordered breathing observed (AHI < 1) without sleep-associated hypoxemia.  TCM not suggestive of hypoventilation.  Pre-study VBG WNL.  Potential that severity under-reported given no supine REM and patient sleeping with head of bed elevated.  Events ? The polysomnogram revealed a " presence of - obstructive, - central, and - mixed apneas resulting in an apnea index of - events per hour. There were - obstructive hypopneas and - central hypopneas resulting in an obstructive hypopnea index of - and central hypopnea index of - events per hour. The combined apnea/hypopnea index was - events per hour (central apnea/hypopnea index was - events per hour). The REM AHI was - events per hour. The supine AHI was - events per hour. The RERA index was - events per hour.  The RDI was - events per hour.  Snoring - was reported as mild to moderate.  Respiratory rate and pattern - was notable for normal respiratory rate and pattern.  Sustained Sleep Associated Hypoventilation - Transcutaneous carbon dioxide monitoring was used, however significant hypoventilation was not present with a maximum change of ~3-4 mmHg and 0 minutes at or greater than 55 mmHg.  Sleep Associated Hypoxemia - (Greater than 5 minutes O2 sat at or below 88%) was not present. Baseline oxygen saturation was 95.9%. Lowest oxygen saturation was 85.0%. Time spent less than or equal to 88% was 0.5 minutes. Time spent less than or equal to 89% was 0.7 minutes.    Movement Activity: Rare PLM's observed  Periodic Limb Activity - There were 9 PLMs during the entire study. The PLM index was 1.5 movements per hour. The PLM Arousal Index was - per hour.  REM EMG Activity - Excessive transient/sustained muscle activity was not present.  Nocturnal Behavior - Abnormal sleep related behaviors were not noted during/arising out of NREM / REM sleep.   Bruxism - None apparent.    Cardiac Summary: Appears NSR  The average pulse rate was 82.9 bpm. The minimum pulse rate was 64.0 bpm while the maximum pulse rate was 115.0 bpm.      Assessment:   Delayed sleep onset and REM latency, decreased percentages of N3 and REM, no supine REM observed.  Increased arousal index.  No sleep-disordered breathing observed (AHI < 1) without sleep-associated hypoxemia.  TCM not  suggestive of hypoventilation.  Pre-study VBG WNL.  Potential that severity under-reported given no supine REM and patient sleeping with head of bed elevated.  Rare PLM's observed.    Recommendations:  Advice regarding the risks of drowsy driving.  Suggest optimizing sleep schedule and avoiding sleep deprivation.  Weight management is crucial.  Pharmacologic therapy should be used for management of restless legs syndrome only if present and clinically indicated and not based on the presence of periodic limb movements alone.    Diagnostic Codes:   Unspecified Sleep Disturbance G47.9     _____________________________________   Electronically Signed By: Damion Marshall MD (8/23/2024)

## 2024-09-05 ENCOUNTER — HOSPITAL ENCOUNTER (OUTPATIENT)
Dept: ULTRASOUND IMAGING | Facility: OTHER | Age: 29
Discharge: HOME | End: 2024-09-05
Attending: OBSTETRICS & GYNECOLOGY
Payer: COMMERCIAL

## 2024-09-05 ENCOUNTER — OFFICE VISIT (OUTPATIENT)
Dept: OBGYN | Facility: OTHER | Age: 29
End: 2024-09-05
Attending: INTERNAL MEDICINE
Payer: COMMERCIAL

## 2024-09-05 VITALS
BODY MASS INDEX: 61.67 KG/M2 | WEIGHT: 293 LBS | HEART RATE: 80 BPM | DIASTOLIC BLOOD PRESSURE: 82 MMHG | SYSTOLIC BLOOD PRESSURE: 124 MMHG

## 2024-09-05 DIAGNOSIS — N92.1 MENORRHAGIA WITH IRREGULAR CYCLE: Primary | ICD-10-CM

## 2024-09-05 DIAGNOSIS — N92.0 MENORRHAGIA: ICD-10-CM

## 2024-09-05 LAB
HCG UR QL: NEGATIVE
PROLACTIN SERPL 3RD IS-MCNC: 7 NG/ML (ref 5–23)
T3FREE SERPL-MCNC: 3.3 PG/ML (ref 2–4.4)
T4 FREE SERPL-MCNC: 1.09 NG/DL (ref 0.9–1.7)
TSH SERPL DL<=0.005 MIU/L-ACNC: 1.13 UIU/ML (ref 0.3–4.2)

## 2024-09-05 PROCEDURE — 99204 OFFICE O/P NEW MOD 45 MIN: CPT | Mod: 25 | Performed by: OBSTETRICS & GYNECOLOGY

## 2024-09-05 PROCEDURE — 84146 ASSAY OF PROLACTIN: CPT | Mod: ZL | Performed by: OBSTETRICS & GYNECOLOGY

## 2024-09-05 PROCEDURE — 84439 ASSAY OF FREE THYROXINE: CPT | Mod: ZL | Performed by: OBSTETRICS & GYNECOLOGY

## 2024-09-05 PROCEDURE — 58100 BIOPSY OF UTERUS LINING: CPT | Performed by: OBSTETRICS & GYNECOLOGY

## 2024-09-05 PROCEDURE — 82627 DEHYDROEPIANDROSTERONE: CPT | Mod: ZL | Performed by: OBSTETRICS & GYNECOLOGY

## 2024-09-05 PROCEDURE — 36415 COLL VENOUS BLD VENIPUNCTURE: CPT | Mod: ZL | Performed by: OBSTETRICS & GYNECOLOGY

## 2024-09-05 PROCEDURE — 88305 TISSUE EXAM BY PATHOLOGIST: CPT

## 2024-09-05 PROCEDURE — G0463 HOSPITAL OUTPT CLINIC VISIT: HCPCS | Mod: 25 | Performed by: OBSTETRICS & GYNECOLOGY

## 2024-09-05 PROCEDURE — 81025 URINE PREGNANCY TEST: CPT | Mod: ZL | Performed by: OBSTETRICS & GYNECOLOGY

## 2024-09-05 PROCEDURE — 84481 FREE ASSAY (FT-3): CPT | Mod: ZL | Performed by: OBSTETRICS & GYNECOLOGY

## 2024-09-05 PROCEDURE — 76830 TRANSVAGINAL US NON-OB: CPT

## 2024-09-05 PROCEDURE — 84403 ASSAY OF TOTAL TESTOSTERONE: CPT | Mod: ZL | Performed by: OBSTETRICS & GYNECOLOGY

## 2024-09-05 PROCEDURE — 84443 ASSAY THYROID STIM HORMONE: CPT | Mod: ZL | Performed by: OBSTETRICS & GYNECOLOGY

## 2024-09-05 ASSESSMENT — PAIN SCALES - GENERAL: PAINLEVEL: NO PAIN (0)

## 2024-09-05 NOTE — PROGRESS NOTES
Gynecology Visit    CC: AUB    HPI:    Simi Godwin is a 29 year old , here for the above concern. She reports when her periods returned postpartum in , she had regular menses, normal flow. Starting 2021, she had 1.5 years of amenorrhea. When menses returned in 2023, it lasted 10 days, very heavy flow. She then had irregular and infrequent menses until present. When she gets a period, it is sometimes very heavy, sometimes more normal. Her period at the end of August was much lighter than usual. She uses condoms for contraception, is not planning more children. She is actively working on weight loss, using Ozempic, down about 30 pounds already.    OBHx  OB History    Para Term  AB Living   2 2 2 0 0 2   SAB IAB Ectopic Multiple Live Births   0 0 0 0 2      # Outcome Date GA Lbr Ariel/2nd Weight Sex Type Anes PTL Lv   2 Term 19 37w5d  3.305 kg (7 lb 4.6 oz) F CS-LTranv Spinal N [place]      Complications: Preeclampsia/Hypertension      Name: NANCY,FEMALE-SIMI      Apgar1: 9  Apgar5: 9   1 Term 16 40w0d  3.317 kg (7 lb 5 oz) M CS-LVertical Spinal, EPI N [place]      Complications: Preeclampsia/Hypertension      Name: Josh      Apgar1: 9  Apgar5: 10       PMHx:   Past Medical History:   Diagnosis Date    Obesity complicating pregnancy 2016    Polyhydramnios     Supraventricular tachycardia (H24)       PSHx:   Past Surgical History:   Procedure Laterality Date     SECTION N/A 2019    Procedure:  SECTION;  Surgeon: Tomer Pereira MD;  Location:  OR    [facility] OR CATH ABLATION NON-CARDIAC ENDOVASCULAR NTAP  2012    AVNRT    RELEASE CARPAL TUNNEL Bilateral 2013    TONSILLECTOMY       Meds:   Current Outpatient Medications   Medication Sig Dispense Refill    ACCU-CHEK GUIDE test strip USE TO TEST BLOOD SUGAR ONE TIME DAILY 100 strip 3    blood glucose (NO BRAND SPECIFIED) lancets standard Use to test blood sugar 1 time daily. 100 lancet 3     blood glucose monitoring (NO BRAND SPECIFIED) meter device kit Use to test blood sugar 1 times daily. 1 kit 0    ferrous sulfate (FE TABS) 325 (65 Fe) MG EC tablet Take 1 tablet (325 mg) by mouth daily Otc okay 90 tablet 4    ketoconazole (NIZORAL) 2 % external shampoo SHAMPOO INTO THE SCALP DAILY AS NEEDED      omeprazole (PRILOSEC) 20 MG DR capsule Take 20 mg by mouth      Semaglutide, 1 MG/DOSE, (OZEMPIC) 4 MG/3ML pen Inject 1 mg subcutaneously every 7 days 3 mL 0    Semaglutide, 1 MG/DOSE, (OZEMPIC) 4 MG/3ML pen Inject 1 mg subcutaneously every 7 days 3 mL 0    sertraline (ZOLOFT) 50 MG tablet TAKE 1 TABLET(50 MG) BY MOUTH DAILY 90 tablet 3     No current facility-administered medications for this visit.      Allergies:   No Known Allergies    SocHx:   Social History     Tobacco Use    Smoking status: Former     Current packs/day: 0.00     Average packs/day: 0.3 packs/day for 2.0 years (0.5 ttl pk-yrs)     Types: Vaping Device, Cigarettes     Start date: 2017     Quit date: 2019     Years since quittin.6     Passive exposure: Never    Smokeless tobacco: Never    Tobacco comments:     1-3 a day   Vaping Use    Vaping status: Every Day    Substances: Nicotine, Flavoring    Devices: Pre-filled or refillable cartridge   Substance Use Topics    Alcohol use: Not Currently     Comment: occasional -1-2 drinks a week    Drug use: No     Comment: Drug use: No       Lives with her fiance and their 2 kids. Works as a PCA    FamHx:   Family History   Problem Relation Age of Onset    Coronary Artery Disease Father 59    Thrombosis Father     Heart Failure Brother 30    Diabetes Maternal Grandfather     Cancer Paternal Grandfather     Breast Cancer No family hx of     Colon Cancer No family hx of         Physical Exam  /82 (BP Location: Right arm, Patient Position: Sitting, Cuff Size: Adult Large)   Pulse 80   Wt (!) 153 kg (337 lb 3.2 oz)   LMP 2024 (Exact Date)   BMI 61.67 kg/m   "  Gen: Well-appearing, NAD  Resp: nonlabored  Psych: appropriate mood and affect  Pelvic: normal external genitalia. Normal vagina and cervix- high in the pelvis. No lesions.    Endometrial Biopsy Procedure Note    Time Out - \"Pause for the Cause\"  Just before the procedure begins, through verbal and active participation of team members, verify:       Initials   Patient Name Simi Godwin    Patient  1995    Procedure to be performed Endometrial biopsy     Pregnancy test:  negative    Consent: Risks, benefits of treatment, and no treatment were discussed.  Patient's questions were elicited and answered.     Using a large Graves speculum, the cervix was visualized. The cervix was prepped with Betadine.  A single tooth tenaculum was applied to the anterior lip of the cervix. The endometrial pipelle was advanced through the cervix without difficulty and a sample collected. One additional pass was made.  The tenaculum was removed from the cervix and the tenaculum site made hemostatic with Silver Nitrate sticks .    EBL:  minimal  Complications:  none apparent    Pelvic US 24:  FINDINGS:     Uterus:  Anteverted position.  Uterus measures 10.2 x 5.5 x 4.8 cm.  Scar from prior  is noted. No other focal abnormality of the  uterus.     Endometrium:  Endometrial thickness is 14 mm. No focal abnormality of  the endometrium.      Ovaries: The ovaries are not visualized. No solid adnexal mass is  demonstrated.     Pelvic fluid: None.                                                                      IMPRESSION:      No focal abnormality of the uterus or endometrium. The ovaries are not  visualized.      Assessment/Plan  Simi Godwin is a 29 year old  female here for abnormal uterine bleeding. She had a recent normal TSH. Reviewed ultrasound results- essentially normal. The endometrium can be a normal thickness for a premenopausal woman, but given her added risk factor for hyperplasia and " malignancy secondary to obesity, endometrial biopsy was recommended and completed today. Recommend obtaining additional PCOS labs. Ovaries could not be visualized on ultrasound to determine morphology. Discussed impact of weight on menstrual regularity- was more regular before weight gain. She is actively working on weight loss and this may be enough to regulate her cycles again. Would not recommend OCPs with borderline hypertension. Also advised against Depo with concern for added weight gain.  Discussed option of Mirena vs cyclic Provera if menses do not self-regulate. She will consider her options and decide at her follow-up visit when we also review her lab results      Jody Arcos MD  OB/GYN  9/5/2024 11:00 AM

## 2024-09-05 NOTE — NURSING NOTE
Chief Complaint   Patient presents with    Consult     Menorrhagia        Medication Reconciliation: complete        Marlee Pal LPN........................9/5/2024  10:40 AM

## 2024-09-06 LAB — DHEA-S SERPL-MCNC: 315 UG/DL (ref 35–430)

## 2024-09-07 LAB — TESTOST SERPL-MCNC: 26 NG/DL (ref 8–60)

## 2024-09-10 LAB
PATH REPORT.COMMENTS IMP SPEC: NORMAL
PATH REPORT.FINAL DX SPEC: NORMAL
PHOTO IMAGE: NORMAL

## 2024-09-10 NOTE — PROGRESS NOTES
"Simi Godwin is a 29 year old female who is being evaluated via a billable video visit.       The patient has been notified of following:      \"This video visit will be conducted via a call between you and your physician/provider. We have found that certain health care needs can be provided without the need for an in-person physical exam.  This service lets us provide the care you need with a video conversation.  If a prescription is necessary we can send it directly to your pharmacy.  If lab work is needed we can place an order for that and you can then stop by our lab to have the test done at a later time.     Video visits are billed at different rates depending on your insurance coverage.  Please reach out to your insurance provider with any questions.     If during the course of the call the physician/provider feels a video visit is not appropriate, you will not be charged for this service.\"     Patient has given verbal consent for Video visit? Yes  How would you like to obtain your AVS? Mail a copy  If you are dropped from the video visit, the video invite should be resent to: Text to cell phone: -  Will anyone else be joining your video visit? No  If patient encounters technical issues they should call [phone]      Video-Visit Details     Type of service:  Video Visit     Start Time:  1:05  End Time:  1:25    Originating Location (pt. Location): Home     Distant Location (provider location):  Off-site, [facility] [place]       Platform used for Video Visit: [employer]    Virtual visit for review of sleep testing results.     A/P:     1.)  No sleep-disordered breathing observed (AHI < 1) without sleep-associated hypoxemia.   - TCM not suggestive of hypoventilation.   - Pre-study VBG WNL.   - Potential that severity under-reported given no supine REM and patient sleeping with head of bed elevated.     Overall, given that she reports minimal supine sleep at home and most often sleeping in " "a recliner due to back pain, I feel reassured that there is no significant sleep disordered breathing and also very reassuring for no hypoxemia and no evidence of hypercapnia or hypoventilation.    We discussed follow-up as needed, weight management as a way to prevent new onset of obstructive sleep apnea.  We would consider repeat testing if there were to be new evidence of significant cardiac arrhythmias, changes in heart function, new pulmonary concerns or further weight gain.    SUBJECTIVE:  Simi Godwin is a 29 year old female.    29 year old female who is referred for sleep disordered breathing.     Pertinent PMHx of type 2 diabetes, supraventricular tachycardia, severe obesity (BMI ~62).     Most recent echocardiogram on 6/20/2024.  Normal LVEF of 60-65%, normal right ventricular size and function.     No prior blood gases for review.     STOP-BANG score of 5, with unknown neck circumference.  [place] score of 3.  HAKEEM: 8     BMI of Estimated body mass index is 62 kg/m  as calculated from the following:    Height as of 6/28/24: 1.575 m (5' 2\").    Weight as of 7/9/24: 153.8 kg (339 lb).      Today - We reviewed her sleep study results in detail.    Chief concern per questionnaire: \"Cardiologist recommended \"     Duration of symptoms:  \"~2-3 years ago \"     Goals for visit per questionnaire: \"Not sure\"     Sleep pattern:  Workdays.  Midnight-9:30 AM.  Weekends.  11 PM-1 AM until 9:30 AM.  Time to fall asleep: ~10-20 minutes.  Awakenings: 0-3 times per night, few minutes to return to sleep.  Average total sleep time:  6-8 hours  Napping.  5 days per week, 1-3 hours per nap.     Yes for TV, phone in bed.     No for RLS screen.  No for sleep walking.  No for dream enactment behavior.  No for bruxism.     No for morning headaches.  Yes for snoring.  No for observed apnea.  Yes for FHx of LEOLA - father.     Caffeine use:  No for 3+ per day.  No for within 6 hours of bed.    SLEEP STUDY INTERPRETATION  DIAGNOSTIC " "POLYSOMNOGRAPHY REPORT        Patient: COREY CRUZ  YOB: 1995  Study Date: 8/20/2024  MRN: [id number]  Referring Provider: Megan Joya MD  Ordering Provider: Damion Marshall MD     Indications for Polysomnography: The patient is a 29 year old Female who is 5' 2\" and weighs 339.0 lbs. Her BMI is 62.4, [place] sleepiness scale 3 and neck circumference is - cm. Relevant medical history includes type 2 diabetes supraventricular tachycardia, severe obesity (BMI ~62). A diagnostic polysomnogram was performed to evaluate for sleep apnea/hypoventilation/hypoxemia.     Polysomnogram Data: A full night polysomnogram recorded the standard physiologic parameters including EEG, EOG, EMG, ECG, nasal and oral airflow. Respiratory parameters of chest and abdominal movements were recorded with respiratory inductance plethysmography. Oxygen saturation was recorded by pulse oximetry. Hypopnea scoring rule used: 1B 4%.     Sleep Architecture: Delayed sleep onset and REM latency, decreased percentages of N3 and REM, no supine REM observed.  Increased arousal index.  The total recording time of the polysomnogram was 458.3 minutes. The total sleep time was 356.5 minutes. Sleep latency was delayed at 84.5 minutes without the use of a sleep aid. REM latency was 243.5 minutes. Arousal index was increased at 20.2 arousals per hour. Sleep efficiency was decreased at 77.8%. Wake after sleep onset was 17.5 minutes. The patient spent 2.7% of total sleep time in Stage N1, 85.8% in Stage N2, 1.7% in Stage N3, and 9.8% in REM. Time in REM supine was - minutes.     Respiration: No sleep-disordered breathing observed (AHI < 1) without sleep-associated hypoxemia.  TCM not suggestive of hypoventilation.  Pre-study VBG WNL.  Potential that severity under-reported given no supine REM and patient sleeping with head of bed elevated.  Events ? The polysomnogram revealed a presence of - obstructive, - central, and - " mixed apneas resulting in an apnea index of - events per hour. There were - obstructive hypopneas and - central hypopneas resulting in an obstructive hypopnea index of - and central hypopnea index of - events per hour. The combined apnea/hypopnea index was - events per hour (central apnea/hypopnea index was - events per hour). The REM AHI was - events per hour. The supine AHI was - events per hour. The RERA index was - events per hour.  The RDI was - events per hour.  Snoring - was reported as mild to moderate.  Respiratory rate and pattern - was notable for normal respiratory rate and pattern.  Sustained Sleep Associated Hypoventilation - Transcutaneous carbon dioxide monitoring was used, however significant hypoventilation was not present with a maximum change of ~3-4 mmHg and 0 minutes at or greater than 55 mmHg.  Sleep Associated Hypoxemia - (Greater than 5 minutes O2 sat at or below 88%) was not present. Baseline oxygen saturation was 95.9%. Lowest oxygen saturation was 85.0%. Time spent less than or equal to 88% was 0.5 minutes. Time spent less than or equal to 89% was 0.7 minutes.     Movement Activity: Rare PLM's observed  Periodic Limb Activity - There were 9 PLMs during the entire study. The PLM index was 1.5 movements per hour. The PLM Arousal Index was - per hour.  REM EMG Activity - Excessive transient/sustained muscle activity was not present.  Nocturnal Behavior - Abnormal sleep related behaviors were not noted during/arising out of NREM / REM sleep.   Bruxism - None apparent.     Cardiac Summary: Appears NSR  The average pulse rate was 82.9 bpm. The minimum pulse rate was 64.0 bpm while the maximum pulse rate was 115.0 bpm.       Assessment:   Delayed sleep onset and REM latency, decreased percentages of N3 and REM, no supine REM observed.  Increased arousal index.  No sleep-disordered breathing observed (AHI < 1) without sleep-associated hypoxemia.  TCM not suggestive of hypoventilation.  Pre-study  VBG WNL.  Potential that severity under-reported given no supine REM and patient sleeping with head of bed elevated.  Rare PLM's observed.     Recommendations:  Advice regarding the risks of drowsy driving.  Suggest optimizing sleep schedule and avoiding sleep deprivation.  Weight management is crucial.  Pharmacologic therapy should be used for management of restless legs syndrome only if present and clinically indicated and not based on the presence of periodic limb movements alone.     Diagnostic Codes:   Unspecified Sleep Disturbance G47.9     _____________________________________   Electronically Signed By: Damion Marshall MD (2024)       Past medical history:    Patient Active Problem List    Diagnosis Date Noted    Diabetes mellitus, type 2 (H) 12/15/2022     Priority: Medium    History of cardiac radiofrequency ablation for SVT 2020     Priority: Medium    H/O supraventricular tachycardia 2020     Priority: Medium    S/P  section 2019     Priority: Medium    History of  2018     Priority: Medium    Nicotine use disorder 2018     Priority: Medium    BMI 50.0-59.9, adult (H) 2016     Priority: Medium       10 point ROS of systems including Constitutional, Eyes, Respiratory, Cardiovascular, Gastroenterology, Genitourinary, Integumentary, Muscularskeletal, Psychiatric were all negative except for pertinent positives noted in my HPI.    Current Outpatient Medications   Medication Sig Dispense Refill    ACCU-CHEK GUIDE test strip USE TO TEST BLOOD SUGAR ONE TIME DAILY 100 strip 3    blood glucose (NO BRAND SPECIFIED) lancets standard Use to test blood sugar 1 time daily. 100 lancet 3    blood glucose monitoring (NO BRAND SPECIFIED) meter device kit Use to test blood sugar 1 times daily. 1 kit 0    ferrous sulfate (FE TABS) 325 (65 Fe) MG EC tablet Take 1 tablet (325 mg) by mouth daily Otc okay 90 tablet 4    ketoconazole (NIZORAL) 2 % external shampoo  SHAMPOO INTO THE SCALP DAILY AS NEEDED      omeprazole (PRILOSEC) 20 MG DR capsule Take 20 mg by mouth      Semaglutide, 1 MG/DOSE, (OZEMPIC) 4 MG/3ML pen Inject 1 mg subcutaneously every 7 days 3 mL 0    Semaglutide, 1 MG/DOSE, (OZEMPIC) 4 MG/3ML pen Inject 1 mg subcutaneously every 7 days 3 mL 0    sertraline (ZOLOFT) 50 MG tablet TAKE 1 TABLET(50 MG) BY MOUTH DAILY 90 tablet 3       OBJECTIVE:  LMP 08/22/2024 (Exact Date)     Physical Exam     ---  This note was written with the assistance of the Dragon voice-dictation technology software. The final document, although reviewed, may contain errors. For corrections, please contact the office.    Total time spent preparing to see the patient, review of chart, obtaining history and physical examination, review of sleep testing, review of treatment options, education, discussion with patient and documenting in Epic / EMR was 25 minutes.  All time involved was spent on the day of service for the patient (the same day as the patient's appointment).    Damion Marshall MD    Sleep Medicine  [place], [place]  Main Office: [phone]  [facility] [address], [address]  Schedule visits: [phone]  Main Office: [phone]  Fax: [phone]

## 2024-09-12 ENCOUNTER — VIRTUAL VISIT (OUTPATIENT)
Dept: PULMONOLOGY | Facility: OTHER | Age: 29
End: 2024-09-12
Attending: FAMILY MEDICINE
Payer: COMMERCIAL

## 2024-09-12 VITALS — WEIGHT: 293 LBS | BODY MASS INDEX: 53.92 KG/M2 | HEIGHT: 62 IN

## 2024-09-12 DIAGNOSIS — Z86.79 H/O SUPRAVENTRICULAR TACHYCARDIA: ICD-10-CM

## 2024-09-12 PROCEDURE — 99203 OFFICE O/P NEW LOW 30 MIN: CPT | Mod: 95 | Performed by: FAMILY MEDICINE

## 2024-09-12 ASSESSMENT — PAIN SCALES - GENERAL: PAINLEVEL: NO PAIN (0)

## 2024-09-12 NOTE — PROGRESS NOTES
"Virtual Visit Details    Type of service:  Video Visit   Video Start Time: {video visit start/end time for provider to select:139181}  Video End Time:{video visit start/end time for provider to select:172738}    Originating Location (pt. Location): {video visit patient location:392692::\"Home\"}  {PROVIDER LOCATION On-site should be selected for visits conducted from your clinic location or adjoining [facility] hospital, academic office, or other nearby [facility] building. Off-site should be selected for all other provider locations, including home:722138}  Distant Location (provider location):  {virtual location provider:230703}  Platform used for Video Visit: {Virtual Visit Platforms:718379::\"[employer]\"}    "

## 2024-09-12 NOTE — NURSING NOTE
Current patient location: [address] [address]    Is the patient currently in the state of [place]? YES    Visit mode:VIDEO    If the visit is dropped, the patient can be reconnected by: VIDEO VISIT: Text to cell phone:   Telephone Information:   Mobile [phone]       Will anyone else be joining the visit? NO  (If patient encounters technical issues they should call [phone])    How would you like to obtain your AVS? MyChart    Are changes needed to the allergy or medication list? No    Are refills needed on medications prescribed by this physician? NO    Rooming Documentation:  Questionnaire(s) completed      Reason for visit: SOL CHENF

## 2024-09-17 ENCOUNTER — MYC REFILL (OUTPATIENT)
Dept: FAMILY MEDICINE | Facility: OTHER | Age: 29
End: 2024-09-17
Payer: COMMERCIAL

## 2024-09-17 DIAGNOSIS — E66.01 MORBID OBESITY (H): ICD-10-CM

## 2024-09-18 ENCOUNTER — MYC MEDICAL ADVICE (OUTPATIENT)
Dept: FAMILY MEDICINE | Facility: OTHER | Age: 29
End: 2024-09-18
Payer: COMMERCIAL

## 2024-09-18 DIAGNOSIS — E66.01 MORBID OBESITY (H): ICD-10-CM

## 2024-09-18 RX ORDER — SEMAGLUTIDE 1.34 MG/ML
INJECTION, SOLUTION SUBCUTANEOUS
Qty: 3 ML | Refills: 0 | OUTPATIENT
Start: 2024-09-18

## 2024-09-19 VITALS — BODY MASS INDEX: 62.74 KG/M2 | WEIGHT: 293 LBS

## 2024-09-19 NOTE — TELEPHONE ENCOUNTER
Dr. Joya,    Patient has actually had a bit of weight gain on the Ozempic 1mg since seeing you last.  I don't know if you want me to see if she's interested in titrating up to 2mg at this time?   Or stay at 1mg since A1C is within goal?     Marcia Downs RN on 9/19/2024 at 10:06 AM        8/2/24  LOV with Issa for DM Type 2.     Type 2 diabetes mellitus without complication, without long-term current use of insulin (H)     A1c is within goal.  She will continue on Ozempic 1 mg weekly.  She has had substantial weight loss and is tolerating this dosage well.  Declines titration.    Marcia Downs RN on 9/19/2024 at 10:00 AM

## 2024-09-19 NOTE — TELEPHONE ENCOUNTER
Refill request being dealt with in MyChart Encounter.     Marcia Downs RN on 9/19/2024 at 10:02 AM

## 2024-09-21 ENCOUNTER — HEALTH MAINTENANCE LETTER (OUTPATIENT)
Age: 29
End: 2024-09-21

## 2024-10-15 DIAGNOSIS — E66.01 MORBID OBESITY (H): ICD-10-CM

## 2024-10-16 ENCOUNTER — MYC REFILL (OUTPATIENT)
Dept: FAMILY MEDICINE | Facility: OTHER | Age: 29
End: 2024-10-16
Payer: COMMERCIAL

## 2024-10-16 DIAGNOSIS — E66.01 MORBID OBESITY (H): ICD-10-CM

## 2024-10-16 RX ORDER — SEMAGLUTIDE 1.34 MG/ML
INJECTION, SOLUTION SUBCUTANEOUS
Qty: 3 ML | Refills: 0 | Status: SHIPPED | OUTPATIENT
Start: 2024-10-16

## 2024-10-16 NOTE — TELEPHONE ENCOUNTER
[facility] sent Rx request for the following:      Requested Prescriptions   Pending Prescriptions Disp Refills    OZEMPIC (1 MG/DOSE) 4 MG/3ML pen [Pharmacy Med Name: OZEMPIC 1MG PER DOSE (4MG/3ML) PFP] 3 mL 0     Sig: INJECT 1MG UNDER THE SKIN ONCE A WEEK       GLP-1 Agonists Protocol Failed - 10/16/2024 11:05 AM        Failed - Medication indicated for associated diagnosis     Medication is associated with one or more of the following diagnoses:     Type 2 diabetes mellitus      Last Prescription Date:   9/20/24  Last Fill Qty/Refills:         3 ml, R-0    Last Office Visit:              8/2/24 (Obesity discussed)   Future Office visit:           None    Unable to complete prescription refill per RN Medication Refill Policy. Miguelina Newman RN .............. 10/16/2024  11:12 AM

## 2024-10-21 ENCOUNTER — MYC MEDICAL ADVICE (OUTPATIENT)
Dept: FAMILY MEDICINE | Facility: OTHER | Age: 29
End: 2024-10-21
Payer: COMMERCIAL

## 2024-10-21 DIAGNOSIS — D50.0 IRON DEFICIENCY ANEMIA DUE TO CHRONIC BLOOD LOSS: Primary | ICD-10-CM

## 2024-10-21 NOTE — TELEPHONE ENCOUNTER
Pt has been having more heart palpitations and craving ice again. Wanting Iron checked.    Stopped iron on 8/2.    Peng'd up Ferritin lab.     Routing to provider to review and respond.  Shayna Mcdermott RN on 10/21/2024 at 4:02 PM

## 2024-10-22 ENCOUNTER — LAB (OUTPATIENT)
Dept: LAB | Facility: OTHER | Age: 29
End: 2024-10-22
Attending: NURSE PRACTITIONER
Payer: COMMERCIAL

## 2024-10-22 DIAGNOSIS — D50.0 IRON DEFICIENCY ANEMIA DUE TO CHRONIC BLOOD LOSS: ICD-10-CM

## 2024-10-22 LAB
BASOPHILS # BLD AUTO: 0.1 10E3/UL (ref 0–0.2)
BASOPHILS NFR BLD AUTO: 1 %
EOSINOPHIL # BLD AUTO: 0.1 10E3/UL (ref 0–0.7)
EOSINOPHIL NFR BLD AUTO: 1 %
ERYTHROCYTE [DISTWIDTH] IN BLOOD BY AUTOMATED COUNT: 17.6 % (ref 10–15)
FERRITIN SERPL-MCNC: 19 NG/ML (ref 6–175)
HCT VFR BLD AUTO: 40.2 % (ref 35–47)
HGB BLD-MCNC: 12.4 G/DL (ref 11.7–15.7)
IMM GRANULOCYTES # BLD: 0.1 10E3/UL
IMM GRANULOCYTES NFR BLD: 1 %
LYMPHOCYTES # BLD AUTO: 3 10E3/UL (ref 0.8–5.3)
LYMPHOCYTES NFR BLD AUTO: 29 %
MCH RBC QN AUTO: 22.9 PG (ref 26.5–33)
MCHC RBC AUTO-ENTMCNC: 30.8 G/DL (ref 31.5–36.5)
MCV RBC AUTO: 74 FL (ref 78–100)
MONOCYTES # BLD AUTO: 0.6 10E3/UL (ref 0–1.3)
MONOCYTES NFR BLD AUTO: 6 %
NEUTROPHILS # BLD AUTO: 6.7 10E3/UL (ref 1.6–8.3)
NEUTROPHILS NFR BLD AUTO: 64 %
NRBC # BLD AUTO: 0 10E3/UL
NRBC BLD AUTO-RTO: 0 /100
PLATELET # BLD AUTO: 341 10E3/UL (ref 150–450)
RBC # BLD AUTO: 5.42 10E6/UL (ref 3.8–5.2)
WBC # BLD AUTO: 10.5 10E3/UL (ref 4–11)

## 2024-10-22 PROCEDURE — 85018 HEMOGLOBIN: CPT | Mod: ZL

## 2024-10-22 PROCEDURE — 82728 ASSAY OF FERRITIN: CPT | Mod: ZL

## 2024-10-22 PROCEDURE — 85004 AUTOMATED DIFF WBC COUNT: CPT | Mod: ZL

## 2024-10-22 PROCEDURE — 36415 COLL VENOUS BLD VENIPUNCTURE: CPT | Mod: ZL

## 2024-11-12 DIAGNOSIS — E66.01 MORBID OBESITY (H): ICD-10-CM

## 2024-11-13 NOTE — TELEPHONE ENCOUNTER
[facility] sent Rx request for the following:      Requested Prescriptions   Pending Prescriptions Disp Refills    OZEMPIC (1 MG/DOSE) 4 MG/3ML pen [Pharmacy Med Name: OZEMPIC 1MG PER DOSE (4MG/3ML) PFP] 3 mL 0     Sig: INJECT 1 MG UNDER THE SKIN ONCE A WEEK       GLP-1 Agonists Protocol Failed - 11/13/2024  5:06 PM        Failed - Medication indicated for associated diagnosis     Medication is associated with one or more of the following diagnoses:     Type 2 diabetes mellitus          Last Prescription Date:   10/16/24  Last Fill Qty/Refills:         3ml, R-0    Last Office Visit:              8/2/24   Future Office visit:                Routing refill request to provider for review/approval     Jennifer Arcos RN on 11/13/2024 at 5:08 PM

## 2024-11-14 ENCOUNTER — MYC REFILL (OUTPATIENT)
Dept: FAMILY MEDICINE | Facility: OTHER | Age: 29
End: 2024-11-14
Payer: COMMERCIAL

## 2024-11-14 DIAGNOSIS — E66.01 MORBID OBESITY (H): ICD-10-CM

## 2024-11-14 RX ORDER — SEMAGLUTIDE 1.34 MG/ML
INJECTION, SOLUTION SUBCUTANEOUS
Qty: 3 ML | Refills: 1 | Status: SHIPPED | OUTPATIENT
Start: 2024-11-14

## 2024-11-14 RX ORDER — SEMAGLUTIDE 1.34 MG/ML
INJECTION, SOLUTION SUBCUTANEOUS
Qty: 3 ML | Refills: 0 | Status: CANCELLED | OUTPATIENT
Start: 2024-11-14

## 2024-11-15 NOTE — TELEPHONE ENCOUNTER
Requested Prescriptions   Pending Prescriptions Disp Refills    Semaglutide (1 MG/DOSE) (OZEMPIC (1 MG/DOSE)) 4 MG/3ML pen 3 mL 0   INJECT 1MG UNDER THE SKIN ONCE A WEEK     Approved 11/14/24. Pt notified via [employer]. Miguelina Newman RN .............. 11/15/2024  2:08 PM

## 2024-12-14 DIAGNOSIS — E66.01 MORBID OBESITY (H): ICD-10-CM

## 2024-12-16 ENCOUNTER — HOSPITAL ENCOUNTER (OUTPATIENT)
Dept: GENERAL RADIOLOGY | Facility: OTHER | Age: 29
Discharge: HOME | End: 2024-12-16
Attending: NURSE PRACTITIONER
Payer: COMMERCIAL

## 2024-12-16 ENCOUNTER — OFFICE VISIT (OUTPATIENT)
Dept: FAMILY MEDICINE | Facility: OTHER | Age: 29
End: 2024-12-16
Attending: NURSE PRACTITIONER
Payer: COMMERCIAL

## 2024-12-16 VITALS
OXYGEN SATURATION: 98 % | DIASTOLIC BLOOD PRESSURE: 85 MMHG | HEART RATE: 91 BPM | WEIGHT: 293 LBS | TEMPERATURE: 98.1 F | HEIGHT: 62 IN | BODY MASS INDEX: 53.92 KG/M2 | SYSTOLIC BLOOD PRESSURE: 140 MMHG | RESPIRATION RATE: 19 BRPM

## 2024-12-16 DIAGNOSIS — S93.402A MILD ANKLE SPRAIN, LEFT, INITIAL ENCOUNTER: Primary | ICD-10-CM

## 2024-12-16 DIAGNOSIS — M25.572 PAIN IN JOINT INVOLVING ANKLE AND FOOT, LEFT: ICD-10-CM

## 2024-12-16 PROCEDURE — G0463 HOSPITAL OUTPT CLINIC VISIT: HCPCS | Mod: 25 | Performed by: NURSE PRACTITIONER

## 2024-12-16 PROCEDURE — 73610 X-RAY EXAM OF ANKLE: CPT | Mod: LT

## 2024-12-16 PROCEDURE — 99213 OFFICE O/P EST LOW 20 MIN: CPT | Performed by: NURSE PRACTITIONER

## 2024-12-16 ASSESSMENT — PAIN SCALES - GENERAL: PAINLEVEL_OUTOF10: SEVERE PAIN (6)

## 2024-12-16 NOTE — PROGRESS NOTES
"Chief Complaint   Patient presents with    Trauma     Left ankle constant pain x3 days   Patient is here to be seen for ankle pain that began 3 days ago.    FOOD SECURITY SCREENING QUESTIONS  Hunger Vital Signs:  Within the past 12 months we worried whether our food would run out before we got money to buy more. Never  Within the past 12 months the food we bought just didn't last and we didn't have money to get more. Never  Marielena Ardon 12/16/2024 4:16 PM      Initial BP (!) 140/85 (BP Location: Left arm, Patient Position: Sitting, Cuff Size: Adult Large)   Pulse 91   Temp 98.1  F (36.7  C) (Tympanic)   Resp 19   Ht 1.575 m (5' 2\")   Wt (!) 158.8 kg (350 lb)   LMP 08/22/2024 (Exact Date)   SpO2 98%   BMI 64.02 kg/m   Estimated body mass index is 64.02 kg/m  as calculated from the following:    Height as of this encounter: 1.575 m (5' 2\").    Weight as of this encounter: 158.8 kg (350 lb).  Medication Reconciliation: complete    Marielena Ardon   "

## 2024-12-16 NOTE — PROGRESS NOTES
ASSESSMENT/PLAN:     I have reviewed the nursing notes.  I have reviewed the findings, diagnosis, plan and need for follow up with the patient.      1. Pain in joint involving ankle and foot, left  - XR Ankle Left G/E 3 Views    2. Mild ankle sprain, left, initial encounter (Primary)  - Ankle/Foot Bracing Supplies Order Ankle Brace; Left    XRay of left ankle completed and personally reviewed, radiologist over read:  No evidence of acute or subacute fracture.  No evidence of dislocation.  Generalized soft tissue swelling.    Xray results reviewed with patient.    Recommend use of ankle brace for comfort and support - wear as needed  May use over-the-counter Tylenol or ibuprofen PRN  Ice and elevate  Activity as tolerated   Discussed warning signs/symptoms indicative of need to f/u  Follow up if symptoms persist or worsen or concerns      I explained my diagnostic considerations and recommendations to the patient, who voiced understanding and agreement with the treatment plan. All questions were answered. We discussed potential side effects of any prescribed or recommended therapies, as well as expectations for response to treatments.    Sadie Toscano NP  [facility]      SUBJECTIVE:   Simi Godwin is a 29 year old female who presents to clinic today for the following health issues:  Ankle pain    HPI  Persisting left ankle pain x 3 days localized in the anterior lateral ankle.  Denies any injury, slip, or fall.  Pain worsens with weight bearing, movement, and ambulation.  Denies numbness or tingling.  No fevers or rash.  Taking Tylenol.  No icing or elevating.        Past Medical History:   Diagnosis Date    Obesity complicating pregnancy 2016    Polyhydramnios     Supraventricular tachycardia (H)      Past Surgical History:   Procedure Laterality Date     SECTION N/A 2019    Procedure:  SECTION;  Surgeon: Tomer Pereira MD;  Location:  OR     OR [facility]  "ABLATION NON-CARDIAC ENDOVASCULAR NTAP      AVNRT    RELEASE CARPAL TUNNEL Bilateral 2013    TONSILLECTOMY       Social History     Tobacco Use    Smoking status: Former     Current packs/day: 0.00     Average packs/day: 0.3 packs/day for 2.0 years (0.5 ttl pk-yrs)     Types: Vaping Device, Cigarettes     Start date: 2017     Quit date: 2019     Years since quittin.9     Passive exposure: Never    Smokeless tobacco: Never    Tobacco comments:     1-3 a day   Substance Use Topics    Alcohol use: Not Currently     Comment: occasional -1-2 drinks a week     Current Outpatient Medications   Medication Sig Dispense Refill    ACCU-CHEK GUIDE test strip USE TO TEST BLOOD SUGAR ONE TIME DAILY 100 strip 3    blood glucose (NO BRAND SPECIFIED) lancets standard Use to test blood sugar 1 time daily. 100 lancet 3    blood glucose monitoring (NO BRAND SPECIFIED) meter device kit Use to test blood sugar 1 times daily. 1 kit 0    ketoconazole (NIZORAL) 2 % external shampoo SHAMPOO INTO THE SCALP DAILY AS NEEDED      omeprazole (PRILOSEC) 20 MG DR capsule Take 20 mg by mouth      OZEMPIC, 1 MG/DOSE, 4 MG/3ML pen INJECT 1 MG UNDER THE SKIN ONCE A WEEK 3 mL 1    Semaglutide, 1 MG/DOSE, (OZEMPIC) 4 MG/3ML pen Inject 1 mg subcutaneously every 7 days 3 mL 0    sertraline (ZOLOFT) 50 MG tablet TAKE 1 TABLET(50 MG) BY MOUTH DAILY 90 tablet 3    ferrous sulfate (FE TABS) 325 (65 Fe) MG EC tablet Take 1 tablet (325 mg) by mouth daily Otc okay (Patient not taking: Reported on 2024) 90 tablet 4     No Known Allergies      Past medical history, past surgical history, current medications and allergies reviewed and accurate to the best of my knowledge.        OBJECTIVE:     BP (!) 140/85 (BP Location: Left arm, Patient Position: Sitting, Cuff Size: Adult Large)   Pulse 91   Temp 98.1  F (36.7  C) (Tympanic)   Resp 19   Ht 1.575 m (5' 2\")   Wt (!) 158.8 kg (350 lb)   LMP 2024 (Exact Date)   SpO2 98%   BMI " 64.02 kg/m    Body mass index is 64.02 kg/m .      Physical Exam  General Appearance: Well appearing adult female, appropriate appearance for age. No acute distress  Cardiac:  CMS intact to left lower extremity with capillary refill less than 2 seconds, no foot edema  Musculoskeletal:  left ankle with localized swelling and tenderness over the anterior lateral ankle joint.  Normal ROM of left ankle without difficulty.  No limping noted with ambulation.  Dermatological: skin intact to left foot and ankle without erythema, bruising or rash   Psychological: normal affect, alert, oriented, and pleasant.       Imaging:  Results for orders placed or performed in visit on 12/16/24   XR Ankle Left G/E 3 Views     Status: None    Narrative    Exam: XR ANKLE LEFT G/E 3 VIEWS     History:Female, age 29 years, Pain in joint involving ankle and foot,  left    Comparison:  No relevant prior imaging.    Technique: Three views are submitted.    Findings: Bones are normally mineralized. No evidence of acute or  subacute fracture.  No evidence of dislocation.  Generalized soft  tissue swelling.           Impression    Impression:  No evidence of acute or subacute bony abnormality.    SAUL PENNINGTON MD         SYSTEM ID:  [id number]

## 2024-12-16 NOTE — LETTER
December 16, 2024      Simi Godwin  [address] [place] [id number]        To Whom It May Concern:    Simi Godwin  was seen on 12/16/24.  Please excuse her from work on 12/17/24 due to ankle pain.        Sincerely,        Sadie Toscano, NP

## 2024-12-17 RX ORDER — SEMAGLUTIDE 1.34 MG/ML
INJECTION, SOLUTION SUBCUTANEOUS
Qty: 3 ML | Refills: 1 | Status: SHIPPED | OUTPATIENT
Start: 2024-12-17

## 2024-12-17 NOTE — TELEPHONE ENCOUNTER
[facility] sent Rx request for the following:      Requested Prescriptions   Pending Prescriptions Disp Refills    OZEMPIC (1 MG/DOSE) 4 MG/3ML pen [Pharmacy Med Name: OZEMPIC 1MG PER DOSE (4MG/3ML) PFP] 3 mL 1     Sig: INJECT 1 MG UNDER THE SKIN ONCE A WEEK       GLP-1 Agonists Protocol Failed - 12/17/2024 11:53 AM        Failed - Medication indicated for associated diagnosis     Medication is associated with one or more of the following diagnoses:     Type 2 diabetes mellitus      Last Prescription Date:   11/14/24  Last Fill Qty/Refills:         3 ml, R-1    Last Office Visit:              8/2/24 (DM discussed)   Future Office visit:           2/18/25    Unable to complete prescription refill per RN Medication Refill Policy. Miguelina Newman RN .............. 12/17/2024  1:59 PM

## 2024-12-25 ENCOUNTER — MYC REFILL (OUTPATIENT)
Dept: FAMILY MEDICINE | Facility: OTHER | Age: 29
End: 2024-12-25
Payer: COMMERCIAL

## 2024-12-25 DIAGNOSIS — F41.1 GAD (GENERALIZED ANXIETY DISORDER): ICD-10-CM

## 2024-12-26 NOTE — TELEPHONE ENCOUNTER
Requested Prescriptions   Pending Prescriptions Disp Refills    sertraline (ZOLOFT) 50 MG tablet 90 tablet 3     Sig: Take 1 tablet (50 mg) by mouth daily.       SSRIs Protocol Failed - 12/26/2024 11:17 AM        Failed - GINO-7 score of less than 5 in past 6 months.     Please review last GINO-7 score.      Last Prescription Date:   12/29/23  Last Fill Qty/Refills:         90, R-3    Last Office Visit:                8/2/24 1/16/24 (Px)    Future Office visit:             Next 5 appointments (look out 90 days)      Feb 18, 2025 1:20 PM  (Arrive by 1:05 PM)  Adult Preventative Visit with Sho Guzman MD  [facility] ([facility]) [address]  [place] [address]  [phone]     Unable to complete prescription refill per RN Medication Refill Policy. Miguelina Newman RN .............. 12/26/2024  11:18 AM

## 2025-01-12 ENCOUNTER — HEALTH MAINTENANCE LETTER (OUTPATIENT)
Age: 30
End: 2025-01-12

## 2025-02-13 SDOH — HEALTH STABILITY: PHYSICAL HEALTH: ON AVERAGE, HOW MANY MINUTES DO YOU ENGAGE IN EXERCISE AT THIS LEVEL?: 10 MIN

## 2025-02-13 SDOH — HEALTH STABILITY: PHYSICAL HEALTH: ON AVERAGE, HOW MANY DAYS PER WEEK DO YOU ENGAGE IN MODERATE TO STRENUOUS EXERCISE (LIKE A BRISK WALK)?: 2 DAYS

## 2025-02-13 ASSESSMENT — SOCIAL DETERMINANTS OF HEALTH (SDOH): HOW OFTEN DO YOU GET TOGETHER WITH FRIENDS OR RELATIVES?: MORE THAN THREE TIMES A WEEK

## 2025-02-16 ENCOUNTER — TRANSFERRED RECORDS (OUTPATIENT)
Dept: MULTI SPECIALTY CLINIC | Facility: CLINIC | Age: 30
End: 2025-02-16

## 2025-02-16 LAB — RETINOPATHY: NORMAL

## 2025-02-18 ENCOUNTER — OFFICE VISIT (OUTPATIENT)
Dept: FAMILY MEDICINE | Facility: OTHER | Age: 30
End: 2025-02-18
Attending: FAMILY MEDICINE
Payer: COMMERCIAL

## 2025-02-18 VITALS
HEART RATE: 111 BPM | HEIGHT: 62 IN | BODY MASS INDEX: 53.92 KG/M2 | WEIGHT: 293 LBS | SYSTOLIC BLOOD PRESSURE: 136 MMHG | OXYGEN SATURATION: 98 % | DIASTOLIC BLOOD PRESSURE: 84 MMHG | TEMPERATURE: 99.3 F | RESPIRATION RATE: 18 BRPM

## 2025-02-18 DIAGNOSIS — E66.01 MORBID OBESITY (H): Primary | ICD-10-CM

## 2025-02-18 DIAGNOSIS — F41.1 GAD (GENERALIZED ANXIETY DISORDER): ICD-10-CM

## 2025-02-18 DIAGNOSIS — L21.9 SEBORRHEIC DERMATITIS: ICD-10-CM

## 2025-02-18 DIAGNOSIS — E11.9 TYPE 2 DIABETES MELLITUS WITHOUT COMPLICATION, WITHOUT LONG-TERM CURRENT USE OF INSULIN (H): ICD-10-CM

## 2025-02-18 LAB
CHOLEST SERPL-MCNC: 149 MG/DL
CREAT UR-MCNC: 136 MG/DL
EST. AVERAGE GLUCOSE BLD GHB EST-MCNC: 134 MG/DL
FASTING STATUS PATIENT QL REPORTED: NO
HBA1C MFR BLD: 6.3 %
HDLC SERPL-MCNC: 36 MG/DL
LDLC SERPL CALC-MCNC: 74 MG/DL
MICROALBUMIN UR-MCNC: <12 MG/L
MICROALBUMIN/CREAT UR: NORMAL MG/G{CREAT}
NONHDLC SERPL-MCNC: 113 MG/DL
TRIGL SERPL-MCNC: 195 MG/DL

## 2025-02-18 PROCEDURE — 82570 ASSAY OF URINE CREATININE: CPT | Mod: ZL | Performed by: FAMILY MEDICINE

## 2025-02-18 PROCEDURE — 83718 ASSAY OF LIPOPROTEIN: CPT | Mod: ZL | Performed by: FAMILY MEDICINE

## 2025-02-18 PROCEDURE — 36415 COLL VENOUS BLD VENIPUNCTURE: CPT | Mod: ZL | Performed by: FAMILY MEDICINE

## 2025-02-18 PROCEDURE — 83036 HEMOGLOBIN GLYCOSYLATED A1C: CPT | Mod: ZL | Performed by: FAMILY MEDICINE

## 2025-02-18 PROCEDURE — G0463 HOSPITAL OUTPT CLINIC VISIT: HCPCS

## 2025-02-18 RX ORDER — SEMAGLUTIDE 1.34 MG/ML
INJECTION, SOLUTION SUBCUTANEOUS
Qty: 3 ML | Refills: 4 | Status: CANCELLED | OUTPATIENT
Start: 2025-02-18

## 2025-02-18 RX ORDER — KETOCONAZOLE 20 MG/ML
SHAMPOO, SUSPENSION TOPICAL DAILY PRN
Qty: 120 ML | Refills: 2 | Status: SHIPPED | OUTPATIENT
Start: 2025-02-18

## 2025-02-18 ASSESSMENT — PAIN SCALES - GENERAL: PAINLEVEL_OUTOF10: NO PAIN (0)

## 2025-02-18 NOTE — PROGRESS NOTES
"Preventive Care Visit  [facility]  Sho Joya MD, Family Medicine  Feb 18, 2025      Assessment & Plan     Morbid obesity (H)    - Tirzepatide 10 MG/0.5ML SOAJ; Inject 0.5 mLs (10 mg) subcutaneously every 7 days.    GINO (generalized anxiety disorder)  Mood stable on Zoloft.  She has been on this medication for over 5 years.  - sertraline (ZOLOFT) 50 MG tablet; Take 1 tablet (50 mg) by mouth daily.    Type 2 diabetes mellitus without complication, without long-term current use of insulin (H)  A1c is within goal.  Continues to struggle with weight.  GLP-1 is certainly been beneficial in regards to blood sugar control but is really not experienced appreciable weight loss.  At her request we will try Mounjaro.  Will start at 10 mg q. 7 days.  I like her to see me again in 1 month for weight check.  She does ask about cortisol levels which I do not think are indicated but we can reconsider that if she continues to have poor results from GLP-1.  Also discussed bariatric surgery referral which she declines.  - Lipid Panel; Future  - Hemoglobin A1c; Future  - Albumin Random Urine Quantitative with Creat Ratio; Future  - FOOT EXAM  - blood glucose (ACCU-CHEK GUIDE) test strip; Use to test blood sugar one times daily or as directed.  - blood glucose (NO BRAND SPECIFIED) lancets standard; Use to test blood sugar 1 time daily.  - Tirzepatide 10 MG/0.5ML SOAJ; Inject 0.5 mLs (10 mg) subcutaneously every 7 days.  - Lipid Panel  - Hemoglobin A1c  - Albumin Random Urine Quantitative with Creat Ratio    Seborrheic dermatitis    - ketoconazole (NIZORAL) 2 % external shampoo; Apply topically daily as needed for itching or irritation.            BMI  Estimated body mass index is 64.92 kg/m  as calculated from the following:    Height as of this encounter: 1.57 m (5' 1.81\").    Weight as of this encounter: 160 kg (352 lb 12.8 oz).       Counseling  Appropriate preventive services were addressed with this " "patient via screening, questionnaire, or discussion as appropriate for fall prevention, nutrition, physical activity, Tobacco-use cessation, social engagement, weight loss and cognition.  Checklist reviewing preventive services available has been given to the patient.  Reviewed patient's diet, addressing concerns and/or questions.   She is at risk for lack of exercise and has been provided with information to increase physical activity for the benefit of her well-being.   The patient was instructed to see the dentist every 6 months.   She is at risk for psychosocial distress and has been provided with information to reduce risk.           No follow-ups on file.    Pk Barragan is a 30 year old, presenting for the following:  Physical, Diabetes, and Recheck Medication    29 yo female presents for recheck on DM-2 and morbid obesity    -120    Patient has now been on a GLP-1 for nearly a year and has not lost any substantial weight.  She actually had a 30 pound weight loss with metformin.  Since switching to GLP-1 she is lost and gained approximately 10 pounds.  Has had some side effects of nausea and \"sulfur burps\"    She is on a few chat groups and asked about cortisol levels.  She is also interested in switching to Mounjaro.    Was having menorrhagia, endometrial biopsy was negative.  She has now been amenorrheic.  History of Present Illness       Diabetes:   She presents for follow up of diabetes.  She is checking home blood glucose a few times a week.   She checks blood glucose before and after meals.  Blood glucose is never over 200 and never under 70. She is aware of hypoglycemia symptoms including shakiness.    She has no concerns regarding her diabetes at this time.  She is having numbness in feet and weight gain.  The patient has not had a diabetic eye exam in the last 12 months.                   Health Care Directive  Patient does not have a Health Care Directive:       2/13/2025   General " Health   How would you rate your overall physical health? (!) FAIR   Feel stress (tense, anxious, or unable to sleep) To some extent   (!) STRESS CONCERN      2025   Nutrition   Three or more servings of calcium each day? Yes   Diet: Diabetic   How many servings of fruit and vegetables per day? (!) 0-1   How many sweetened beverages each day? 0-1         2025   Exercise   Days per week of moderate/strenous exercise 2 days   Average minutes spent exercising at this level 10 min   (!) EXERCISE CONCERN      2025   Social Factors   Frequency of gathering with friends or relatives More than three times a week   Worry food won't last until get money to buy more No   Food not last or not have enough money for food? No   Do you have housing? (Housing is defined as stable permanent housing and does not include staying ouside in a car, in a tent, in an abandoned building, in an overnight shelter, or couch-surfing.) Yes   Are you worried about losing your housing? No   Lack of transportation? No   Unable to get utilities (heat,electricity)? No         2025   Dental   Dentist two times every year? (!) NO            Today's PHQ-2 Score:       2025    12:57 PM   PHQ-2 (  [employer])   Q1: Little interest or pleasure in doing things 1   Q2: Feeling down, depressed or hopeless 1   PHQ-2 Score 2    Q1: Little interest or pleasure in doing things Several days   Q2: Feeling down, depressed or hopeless Several days   PHQ-2 Score 2       Patient-reported           2025   Substance Use   Alcohol more than 3/day or more than 7/wk Not Applicable   Do you use any other substances recreationally? No     Social History     Tobacco Use    Smoking status: Former     Current packs/day: 0.00     Average packs/day: 0.3 packs/day for 2.0 years (0.5 ttl pk-yrs)     Types: Vaping Device, Cigarettes     Start date: 2017     Quit date: 2019     Years since quittin.0     Passive exposure: Never    Smokeless  "tobacco: Never    Tobacco comments:     1-3 a day   Vaping Use    Vaping status: Every Day    Substances: Nicotine, Flavoring    Devices: Pre-filled or refillable cartridge   Substance Use Topics    Alcohol use: Not Currently     Comment: occasional -1-2 drinks a week    Drug use: No     Comment: Drug use: No                  2025   STI Screening   New sexual partner(s) since last STI/HIV test? No     History of abnormal Pap smear:         2022    10:33 AM   PAP / HPV   PAP Negative for Intraepithelial Lesion or Malignancy (NILM)            2025   Contraception/Family Planning   Questions about contraception or family planning No        Reviewed and updated as needed this visit by Provider                    Past Medical History:   Diagnosis Date    Obesity complicating pregnancy 2016    Polyhydramnios     Supraventricular tachycardia      Past Surgical History:   Procedure Laterality Date     SECTION N/A 2019    Procedure:  SECTION;  Surgeon: Tomer Pereira MD;  Location:  OR    [facility] OR CATH ABLATION NON-CARDIAC ENDOVASCULAR NTAP      AVNRT    RELEASE CARPAL TUNNEL Bilateral     TONSILLECTOMY           Review of Systems  Constitutional, HEENT, cardiovascular, pulmonary, gi and gu systems are negative, except as otherwise noted.     Objective    Exam  /84   Pulse 111   Temp 99.3  F (37.4  C) (Tympanic)   Resp 18   Ht 1.57 m (5' 1.81\")   Wt (!) 160 kg (352 lb 12.8 oz)   LMP 2024 (Approximate)   SpO2 98%   Breastfeeding No   BMI 64.92 kg/m     Estimated body mass index is 64.92 kg/m  as calculated from the following:    Height as of this encounter: 1.57 m (5' 1.81\").    Weight as of this encounter: 160 kg (352 lb 12.8 oz).    Physical Exam  GENERAL: alert and no distress  NECK: no adenopathy, no asymmetry, masses, or scars  RESP: lungs clear to auscultation - no rales, rhonchi or wheezes  CV: regular rate and rhythm, normal S1 S2, no S3 or S4, " no murmur, click or rub, no peripheral edema  NEURO: Monofilament testing in both feet was normal.  Skin and nails in good condition  PSYCH: mentation appears normal, affect normal/bright      Results for orders placed or performed in visit on 02/18/25   Lipid Panel     Status: Abnormal   Result Value Ref Range    Cholesterol 149 <200 mg/dL    Triglycerides 195 (H) <150 mg/dL    Direct Measure HDL 36 (L) >=50 mg/dL    LDL Cholesterol Calculated 74 <100 mg/dL    Non HDL Cholesterol 113 <130 mg/dL    Patient Fasting > 8hrs? No     Narrative    Cholesterol  Desirable: < 200 mg/dL  Borderline High: 200 - 239 mg/dL  High: >= 240 mg/dL    Triglycerides  Normal: < 150 mg/dL  Borderline High: 150 - 199 mg/dL  High: 200-499 mg/dL  Very High: >= 500 mg/dL    Direct Measure HDL  Female: >= 50 mg/dL   Male: >= 40 mg/dL    LDL Cholesterol  Desirable: < 100 mg/dL  Above Desirable: 100 - 129 mg/dL   Borderline High: 130 - 159 mg/dL   High:  160 - 189 mg/dL   Very High: >= 190 mg/dL    Non HDL Cholesterol  Desirable: < 130 mg/dL  Above Desirable: 130 - 159 mg/dL  Borderline High: 160 - 189 mg/dL  High: 190 - 219 mg/dL  Very High: >= 220 mg/dL   Hemoglobin A1c     Status: Abnormal   Result Value Ref Range    Estimated Average Glucose 134 (H) <117 mg/dL    Hemoglobin A1C 6.3 (H) <5.7 %   Albumin Random Urine Quantitative with Creat Ratio     Status: None   Result Value Ref Range    Creatinine Urine mg/dL 136.0 mg/dL    Albumin Urine mg/L <12.0 mg/L    Albumin Urine mg/g Cr         Signed Electronically by: Sho Joya MD

## 2025-02-18 NOTE — NURSING NOTE
Patient is here for annual physical, diabetic check and medication check.     Previous A1C is at goal of <8  Lab Results   Component Value Date    A1C 6.4 05/28/2024    A1C 6.2 01/16/2024    A1C 6.6 12/09/2022     Urine Microalbumin/Creat:    Albumin Urine mg/L   Date Value Ref Range Status   01/16/2024 <12.0 mg/L Final     Comment:     The reference ranges have not been established in urine albumin. The results should be integrated into the clinical context for interpretation.     Albumin Urine mg/g Cr   Date Value Ref Range Status   01/16/2024   Final     Comment:     Unable to calculate, urine albumin and/or urine creatinine is outside detectable limits.  Microalbuminuria is defined as an albumin:creatinine ratio of 17 to 299 for males and 25 to 299 for females. A ratio of albumin:creatinine of 300 or higher is indicative of overt proteinuria.  Due to biologic variability, positive results should be confirmed by a second, first-morning random or 24-hour timed urine specimen. If there is discrepancy, a third specimen is recommended. When 2 out of 3 results are in the microalbuminuria range, this is evidence for incipient nephropathy and warrants increased efforts at glucose control, blood pressure control, and institution of therapy with an angiotensin-converting-enzyme (ACE) inhibitor (if the patient can tolerate it).       Creatinine Urine   Date Value Ref Range Status   03/27/2019 119 mg/dL Final     Creatinine Urine mg/dL   Date Value Ref Range Status   01/16/2024 80.6 mg/dL Final     Comment:     The reference ranges have not been established in urine creatinine. The results should be integrated into the clinical context for interpretation.     Foot exam due  Eye exam due    Tobacco User vaping  Patient is not on a daily aspirin  Patient is not on a Statin.  Blood pressure today of:     BP Readings from Last 1 Encounters:   02/18/25 136/84      is at the goal of <139/89 for diabetics.    Denisse Sweet LPN  on 2/18/2025 at 1:11 PM

## 2025-03-02 ENCOUNTER — MYC MEDICAL ADVICE (OUTPATIENT)
Dept: FAMILY MEDICINE | Facility: OTHER | Age: 30
End: 2025-03-02
Payer: COMMERCIAL

## 2025-03-03 NOTE — TELEPHONE ENCOUNTER
Dr. Joya,    Patient's insurance denying Mounjaro unless she tries two of the meds listed below (she has not tried any).     Ozempic 1mg makes her tired so she's hesitant to increase.     Has not lost weight and A1C min improvement on Ozempic 1mg.  .     Do you want her to try a med below, increase Ozempic to 2mg, or do you want to appeal the Mounjaro denial (I doubt it will be fruitful unless there is a reason she can't try one of the meds below):          _______________________________________________    Reviewed Mounjaro Denial letter.     They do require patient tries 2 of the following before approval:    [email or web address]    Marcia Downs RN on 3/3/2025 at 2:39 PM

## 2025-03-08 ENCOUNTER — NURSE TRIAGE (OUTPATIENT)
Dept: NURSING | Facility: CLINIC | Age: 30
End: 2025-03-08
Payer: COMMERCIAL

## 2025-03-08 NOTE — TELEPHONE ENCOUNTER
Clinic Action Needed: Yes, please contact pt on Monday. She has questions about Ozempic dosage. See FNA encounter below. RN unsure if the pharmacist will be able to address all the questions she has today.    Routed to:PCP care team    __________________________________      Nurse Triage SBAR    Situation and Background: Patient calling with questions about her Ozempic medication. Pt states she's supposed to increase her dosage from 1mg to 2mg today. She's been on 1mg since June. She was advised to increase her dose due to gaining weight back and not much improvement in A1C levels.     Pt states her worry about the possible side effects of increasing her dose and would like to talk to someone about it. She questions if she can take 1.5mg instead of the full 2mg dose. She tells me that the Ozempic/Diabetic support group she's in suggested that this may be an option.     Protocol Recommended Disposition:   Call Pharmacist now. Call warm transferred to a 24 hour pharmacy ([facility]) at 09:52AM.     RN will also send this message to PCP care team to contact pt on Monday when the clinic opens again.     Patient verbalized understanding and had no further questions.      Adela Petersen RN  [facility] Nurse Advisor  3/8/2025 9:53 AM     Reason for Disposition   [1] Caller has medicine question about med NOT prescribed by PCP AND [2] triager unable to answer question (e.g., compatibility with other med, storage)     Med prescribed by PCP. Pt has questions about possible side effects.    Additional Information   Negative: [1] Intentional drug overdose AND [2] suicidal thoughts or ideas   Negative: Drug overdose and triager unable to answer question   Negative: Caller requesting a renewal or refill of a medicine patient is currently taking   Negative: Caller requesting information unrelated to medicine   Negative: Caller requesting information about COVID-19 Vaccine   Negative: Caller requesting information about  Emergency Contraception   Negative: Caller requesting information about Combined Birth Control Pills   Negative: Caller requesting information about Progestin Birth Control Pills   Negative: Caller requesting information about Post-Op pain or medicines   Negative: Caller requesting a prescription antibiotic (such as Penicillin) for Strep throat and has a positive culture result   Negative: Caller requesting a prescription anti-viral med (such as Tamiflu) and has influenza (flu) symptoms   Negative: Immunization reaction suspected   Negative: Rash while taking a medicine or within 3 days of stopping it   Negative: [1] Asthma and [2] having symptoms of asthma (cough, wheezing, etc.)   Negative: [1] Symptom of illness (e.g., headache, abdominal pain, earache, vomiting) AND [2] more than mild   Negative: Breastfeeding questions about mother's medicines and diet   Negative: MORE THAN A DOUBLE DOSE of a prescription or over-the-counter (OTC) drug   Negative: [1] DOUBLE DOSE (an extra dose or lesser amount) of prescription drug AND [2] any symptoms (e.g., dizziness, nausea, pain, sleepiness)   Negative: [1] DOUBLE DOSE (an extra dose or lesser amount) of over-the-counter (OTC) drug AND [2] any symptoms (e.g., dizziness, nausea, pain, sleepiness)   Negative: Took another person's prescription drug   Negative: [1] DOUBLE DOSE (an extra dose or lesser amount) of prescription drug AND [2] NO symptoms  (Exception: A double dose of antibiotics.)   Negative: Diabetes drug error or overdose (e.g., took wrong type of insulin or took extra dose)   Negative: [1] Prescription not at pharmacy AND [2] was prescribed by PCP recently (Exception: Triager has access to EMR and prescription is recorded there. Go to Home Care and confirm for pharmacy.)   Negative: [1] Pharmacy calling with prescription question AND [2] triager unable to answer question   Negative: [1] Caller has URGENT medicine question about med that PCP or specialist  prescribed AND [2] triager unable to answer question   Negative: Medicine patch causing local rash or itching    Protocols used: Medication Question Call-A-AH

## 2025-03-09 ENCOUNTER — HEALTH MAINTENANCE LETTER (OUTPATIENT)
Age: 30
End: 2025-03-09

## 2025-03-10 NOTE — TELEPHONE ENCOUNTER
Left message on answering machine to return call to Unit 2 Care team Nurse.  Debra Sanchez RN on 3/10/2025 at 8:26 AM

## 2025-03-11 NOTE — TELEPHONE ENCOUNTER
"Called and spoke with patient.  Patient states she did reach out to a pharmacist on Saturday but states \"she wasn't much help\"  Patient states she was worried about increasing the dose.  Patient states she did the 2 mg injection on Saturday and has had no concerns, states she is doing fine.    Patient advised to call back with any questions or concerns.  Patient verbalized understanding.  Patient states she has an appointment in a couple weeks but will call back if any concerns prior to that.  Jennifer Arcos RN on 3/11/2025 at 9:10 AM    "

## 2025-03-31 ENCOUNTER — HOSPITAL ENCOUNTER (EMERGENCY)
Facility: OTHER | Age: 30
Discharge: HOME | End: 2025-03-31
Attending: EMERGENCY MEDICINE
Payer: COMMERCIAL

## 2025-03-31 ENCOUNTER — APPOINTMENT (OUTPATIENT)
Dept: CT IMAGING | Facility: OTHER | Age: 30
End: 2025-03-31
Attending: EMERGENCY MEDICINE
Payer: COMMERCIAL

## 2025-03-31 VITALS
BODY MASS INDEX: 53.92 KG/M2 | HEART RATE: 90 BPM | RESPIRATION RATE: 18 BRPM | WEIGHT: 293 LBS | HEIGHT: 62 IN | SYSTOLIC BLOOD PRESSURE: 151 MMHG | OXYGEN SATURATION: 97 % | TEMPERATURE: 98.9 F | DIASTOLIC BLOOD PRESSURE: 95 MMHG

## 2025-03-31 DIAGNOSIS — N20.1 CALCULUS OF URETER: ICD-10-CM

## 2025-03-31 LAB
ALBUMIN UR-MCNC: 30 MG/DL
ANION GAP SERPL CALCULATED.3IONS-SCNC: 15 MMOL/L (ref 7–15)
APPEARANCE UR: ABNORMAL
BASOPHILS # BLD AUTO: 0 10E3/UL (ref 0–0.2)
BASOPHILS NFR BLD AUTO: 1 %
BILIRUB UR QL STRIP: NEGATIVE
BUN SERPL-MCNC: 14.5 MG/DL (ref 6–20)
CALCIUM SERPL-MCNC: 9.7 MG/DL (ref 8.8–10.4)
CAOX CRY #/AREA URNS HPF: ABNORMAL /HPF
CHLORIDE SERPL-SCNC: 101 MMOL/L (ref 98–107)
COLOR UR AUTO: YELLOW
CREAT SERPL-MCNC: 0.64 MG/DL (ref 0.51–0.95)
EGFRCR SERPLBLD CKD-EPI 2021: >90 ML/MIN/1.73M2
EOSINOPHIL # BLD AUTO: 0.2 10E3/UL (ref 0–0.7)
EOSINOPHIL NFR BLD AUTO: 2 %
ERYTHROCYTE [DISTWIDTH] IN BLOOD BY AUTOMATED COUNT: 17 % (ref 10–15)
GLUCOSE SERPL-MCNC: 148 MG/DL (ref 70–99)
GLUCOSE UR STRIP-MCNC: NEGATIVE MG/DL
HCO3 SERPL-SCNC: 23 MMOL/L (ref 22–29)
HCT VFR BLD AUTO: 36.4 % (ref 35–47)
HGB BLD-MCNC: 11.2 G/DL (ref 11.7–15.7)
HGB UR QL STRIP: ABNORMAL
HOLD SPECIMEN: NORMAL
IMM GRANULOCYTES # BLD: 0 10E3/UL
IMM GRANULOCYTES NFR BLD: 1 %
KETONES UR STRIP-MCNC: NEGATIVE MG/DL
LEUKOCYTE ESTERASE UR QL STRIP: NEGATIVE
LYMPHOCYTES # BLD AUTO: 2 10E3/UL (ref 0.8–5.3)
LYMPHOCYTES NFR BLD AUTO: 27 %
MCH RBC QN AUTO: 22 PG (ref 26.5–33)
MCHC RBC AUTO-ENTMCNC: 30.8 G/DL (ref 31.5–36.5)
MCV RBC AUTO: 71 FL (ref 78–100)
MONOCYTES # BLD AUTO: 0.4 10E3/UL (ref 0–1.3)
MONOCYTES NFR BLD AUTO: 6 %
MUCOUS THREADS #/AREA URNS LPF: PRESENT /LPF
NEUTROPHILS # BLD AUTO: 4.8 10E3/UL (ref 1.6–8.3)
NEUTROPHILS NFR BLD AUTO: 64 %
NITRATE UR QL: NEGATIVE
NRBC # BLD AUTO: 0 10E3/UL
NRBC BLD AUTO-RTO: 0 /100
PH UR STRIP: 6 [PH] (ref 5–9)
PLATELET # BLD AUTO: 295 10E3/UL (ref 150–450)
POTASSIUM SERPL-SCNC: 4.1 MMOL/L (ref 3.4–5.3)
RBC # BLD AUTO: 5.1 10E6/UL (ref 3.8–5.2)
RBC URINE: >182 /HPF
SODIUM SERPL-SCNC: 139 MMOL/L (ref 135–145)
SP GR UR STRIP: 1.04 (ref 1–1.03)
SQUAMOUS EPITHELIAL: 27 /HPF
UROBILINOGEN UR STRIP-MCNC: 2 MG/DL
WBC # BLD AUTO: 7.5 10E3/UL (ref 4–11)
WBC URINE: 3 /HPF

## 2025-03-31 PROCEDURE — 250N000013 HC RX MED GY IP 250 OP 250 PS 637: Performed by: EMERGENCY MEDICINE

## 2025-03-31 PROCEDURE — 250N000011 HC RX IP 250 OP 636: Performed by: EMERGENCY MEDICINE

## 2025-03-31 PROCEDURE — 74176 CT ABD & PELVIS W/O CONTRAST: CPT

## 2025-03-31 PROCEDURE — 85041 AUTOMATED RBC COUNT: CPT | Performed by: EMERGENCY MEDICINE

## 2025-03-31 PROCEDURE — 85004 AUTOMATED DIFF WBC COUNT: CPT | Performed by: EMERGENCY MEDICINE

## 2025-03-31 PROCEDURE — 80048 BASIC METABOLIC PNL TOTAL CA: CPT | Performed by: EMERGENCY MEDICINE

## 2025-03-31 PROCEDURE — 99285 EMERGENCY DEPT VISIT HI MDM: CPT | Mod: 25

## 2025-03-31 PROCEDURE — 36415 COLL VENOUS BLD VENIPUNCTURE: CPT | Performed by: EMERGENCY MEDICINE

## 2025-03-31 PROCEDURE — 82374 ASSAY BLOOD CARBON DIOXIDE: CPT | Performed by: EMERGENCY MEDICINE

## 2025-03-31 PROCEDURE — 96374 THER/PROPH/DIAG INJ IV PUSH: CPT

## 2025-03-31 PROCEDURE — 99284 EMERGENCY DEPT VISIT MOD MDM: CPT | Performed by: EMERGENCY MEDICINE

## 2025-03-31 PROCEDURE — 81001 URINALYSIS AUTO W/SCOPE: CPT | Performed by: FAMILY MEDICINE

## 2025-03-31 RX ORDER — HYDROCODONE BITARTRATE AND ACETAMINOPHEN 5; 325 MG/1; MG/1
1 TABLET ORAL EVERY 6 HOURS PRN
Qty: 10 TABLET | Refills: 0 | Status: SHIPPED | OUTPATIENT
Start: 2025-03-31 | End: 2025-04-03

## 2025-03-31 RX ORDER — KETOROLAC TROMETHAMINE 30 MG/ML
30 INJECTION, SOLUTION INTRAMUSCULAR; INTRAVENOUS ONCE
Status: COMPLETED | OUTPATIENT
Start: 2025-03-31 | End: 2025-03-31

## 2025-03-31 RX ORDER — TAMSULOSIN HYDROCHLORIDE 0.4 MG/1
0.4 CAPSULE ORAL DAILY
Qty: 5 CAPSULE | Refills: 0 | Status: SHIPPED | OUTPATIENT
Start: 2025-03-31

## 2025-03-31 RX ORDER — TAMSULOSIN HYDROCHLORIDE 0.4 MG/1
0.4 CAPSULE ORAL ONCE
Status: COMPLETED | OUTPATIENT
Start: 2025-03-31 | End: 2025-03-31

## 2025-03-31 RX ADMIN — KETOROLAC TROMETHAMINE 30 MG: 30 INJECTION, SOLUTION INTRAMUSCULAR at 07:25

## 2025-03-31 RX ADMIN — TAMSULOSIN HYDROCHLORIDE 0.4 MG: 0.4 CAPSULE ORAL at 10:00

## 2025-03-31 ASSESSMENT — ENCOUNTER SYMPTOMS
AGITATION: 0
DYSURIA: 1
LIGHT-HEADEDNESS: 0
FLANK PAIN: 1
CHEST TIGHTNESS: 0
FEVER: 0
CHILLS: 0
ABDOMINAL PAIN: 0
SHORTNESS OF BREATH: 0
BACK PAIN: 0

## 2025-03-31 ASSESSMENT — ACTIVITIES OF DAILY LIVING (ADL)
ADLS_ACUITY_SCORE: 44
ADLS_ACUITY_SCORE: 42

## 2025-03-31 ASSESSMENT — COLUMBIA-SUICIDE SEVERITY RATING SCALE - C-SSRS
1. IN THE PAST MONTH, HAVE YOU WISHED YOU WERE DEAD OR WISHED YOU COULD GO TO SLEEP AND NOT WAKE UP?: NO
6. HAVE YOU EVER DONE ANYTHING, STARTED TO DO ANYTHING, OR PREPARED TO DO ANYTHING TO END YOUR LIFE?: NO
2. HAVE YOU ACTUALLY HAD ANY THOUGHTS OF KILLING YOURSELF IN THE PAST MONTH?: NO

## 2025-03-31 NOTE — ED PROVIDER NOTES
History     Chief Complaint   Patient presents with    Urinary Frequency    Flank Pain     HPI  Simi Godwin is a 30 year old female who is here with left flank pain.  This began yesterday.  She states she really did not sleep at all last night because of the discomfort.  Pain radiates around into her left groin area.  Is gotten worse overnight.  She has nausea but no vomiting.  Still urinating.   also has started developing some diarrhea as well.  Nothing makes pain better or worse.  No other complaints or concerns.    Allergies:  No Known Allergies    Problem List:    Patient Active Problem List    Diagnosis Date Noted    Diabetes mellitus, type 2 (H) 12/15/2022     Priority: Medium    History of cardiac radiofrequency ablation for SVT 2020     Priority: Medium    H/O supraventricular tachycardia 2020     Priority: Medium    S/P  section 2019     Priority: Medium    History of  2018     Priority: Medium    Nicotine use disorder 2018     Priority: Medium    BMI 50.0-59.9, adult (H) 2016     Priority: Medium        Past Medical History:    Past Medical History:   Diagnosis Date    Obesity complicating pregnancy 2016    Polyhydramnios     Supraventricular tachycardia        Past Surgical History:    Past Surgical History:   Procedure Laterality Date     SECTION N/A 2019    Procedure:  SECTION;  Surgeon: Tomer Pereira MD;  Location:  OR     OR CATH ABLATION NON-CARDIAC ENDOVASCULAR NTAP      AVNRT    RELEASE CARPAL TUNNEL Bilateral     TONSILLECTOMY         Family History:    Family History   Problem Relation Age of Onset    Coronary Artery Disease Father 59    Thrombosis Father     Heart Failure Brother 30    Diabetes Maternal Grandfather     Cancer Paternal Grandfather     Breast Cancer No family hx of     Colon Cancer No family hx of        Social History:  Marital Status:  Single [1]  Social History     Tobacco  "Use    Smoking status: Former     Current packs/day: 0.00     Average packs/day: 0.3 packs/day for 2.0 years (0.5 ttl pk-yrs)     Types: Vaping Device, Cigarettes     Start date: 2017     Quit date: 2019     Years since quittin.2     Passive exposure: Never    Smokeless tobacco: Never    Tobacco comments:     1-3 a day   Vaping Use    Vaping status: Every Day    Substances: Nicotine, Flavoring    Devices: Pre-filled or refillable cartridge   Substance Use Topics    Alcohol use: Not Currently     Comment: occasional -1-2 drinks a week    Drug use: No     Comment: Drug use: No        Medications:    blood glucose (ACCU-CHEK GUIDE) test strip  blood glucose (NO BRAND SPECIFIED) lancets standard  blood glucose monitoring (NO BRAND SPECIFIED) meter device kit  HYDROcodone-acetaminophen (NORCO) 5-325 MG tablet  ketoconazole (NIZORAL) 2 % external shampoo  omeprazole (PRILOSEC) 20 MG DR capsule  Semaglutide, 2 MG/DOSE, (OZEMPIC) 8 MG/3ML pen  sertraline (ZOLOFT) 50 MG tablet  tamsulosin (FLOMAX) 0.4 MG capsule  Tirzepatide 10 MG/0.5ML SOAJ          Review of Systems   Constitutional:  Negative for chills and fever.   HENT:  Negative for congestion.    Eyes:  Negative for visual disturbance.   Respiratory:  Negative for chest tightness and shortness of breath.    Cardiovascular:  Negative for chest pain.   Gastrointestinal:  Negative for abdominal pain.   Genitourinary:  Positive for dysuria and flank pain.   Musculoskeletal:  Negative for back pain.   Skin:  Negative for rash.   Neurological:  Negative for light-headedness.   Psychiatric/Behavioral:  Negative for agitation.        Physical Exam   BP: (!) 153/94  Pulse: 99  Temp: 98.9  F (37.2  C)  Resp: 18  Height: 157.5 cm (5' 2\")  Weight: (!) 159.7 kg (352 lb)  SpO2: 98 %      Physical Exam  Vitals and nursing note reviewed.   Constitutional:       Appearance: Normal appearance.   HENT:      Head: Normocephalic and atraumatic.      Mouth/Throat:      Mouth: " Mucous membranes are moist.   Eyes:      Conjunctiva/sclera: Conjunctivae normal.   Cardiovascular:      Rate and Rhythm: Normal rate and regular rhythm.      Heart sounds: Normal heart sounds.   Pulmonary:      Effort: Pulmonary effort is normal.      Breath sounds: Normal breath sounds.   Abdominal:      Tenderness: There is no right CVA tenderness or left CVA tenderness.   Skin:     General: Skin is warm and dry.   Neurological:      Mental Status: She is alert and oriented to person, place, and time.   Psychiatric:         Mood and Affect: Mood normal.         ED Course        Procedures                Results for orders placed or performed during the hospital encounter of 03/31/25 (from the past 24 hours)   UA with Microscopic reflex to Culture    Specimen: Urine, Clean Catch   Result Value Ref Range    Color Urine Yellow Colorless, Straw, Light Yellow, Yellow    Appearance Urine Slightly Cloudy (A) Clear    Glucose Urine Negative Negative mg/dL    Bilirubin Urine Negative Negative    Ketones Urine Negative Negative mg/dL    Specific Gravity Urine 1.036 (H) 1.000 - 1.030    Blood Urine Large (A) Negative    pH Urine 6.0 5.0 - 9.0    Protein Albumin Urine 30 (A) Negative mg/dL    Urobilinogen Urine 2.0 (A) Normal mg/dL    Nitrite Urine Negative Negative    Leukocyte Esterase Urine Negative Negative    Mucus Urine Present (A) None Seen /LPF    Calcium Oxalate Crystals Urine Few (A) None Seen /HPF    RBC Urine >182 (H) <=2 /HPF    WBC Urine 3 <=5 /HPF    Squamous Epithelials Urine 27 (H) <=1 /HPF    Narrative    Urine Culture not indicated   CBC with platelets differential    Narrative    The following orders were created for panel order CBC with platelets differential.  Procedure                               Abnormality         Status                     ---------                               -----------         ------                     CBC with platelets and ...[[id number]]  Abnormal            Final result                  Please view results for these tests on the individual orders.   Basic metabolic panel   Result Value Ref Range    Sodium 139 135 - 145 mmol/L    Potassium 4.1 3.4 - 5.3 mmol/L    Chloride 101 98 - 107 mmol/L    Carbon Dioxide (CO2) 23 22 - 29 mmol/L    Anion Gap 15 7 - 15 mmol/L    Urea Nitrogen 14.5 6.0 - 20.0 mg/dL    Creatinine 0.64 0.51 - 0.95 mg/dL    GFR Estimate >90 >60 mL/min/1.73m2    Calcium 9.7 8.8 - 10.4 mg/dL    Glucose 148 (H) 70 - 99 mg/dL   [place] Draw    Narrative    The following orders were created for panel order [place] Draw.  Procedure                               Abnormality         Status                     ---------                               -----------         ------                     Extra Blue Top Tube[[id number]]                             Final result               Extra Red Top Tube[[id number]]                              Final result               Extra Green Top (Lithiu...[[id number]]                      Final result               Extra Purple Top Tube[[id number]]                                                      Extra Green Top (Lithiu...[[id number]]                                                   Please view results for these tests on the individual orders.   CBC with platelets and differential   Result Value Ref Range    WBC Count 7.5 4.0 - 11.0 10e3/uL    RBC Count 5.10 3.80 - 5.20 10e6/uL    Hemoglobin 11.2 (L) 11.7 - 15.7 g/dL    Hematocrit 36.4 35.0 - 47.0 %    MCV 71 (L) 78 - 100 fL    MCH 22.0 (L) 26.5 - 33.0 pg    MCHC 30.8 (L) 31.5 - 36.5 g/dL    RDW 17.0 (H) 10.0 - 15.0 %    Platelet Count 295 150 - 450 10e3/uL    % Neutrophils 64 %    % Lymphocytes 27 %    % Monocytes 6 %    % Eosinophils 2 %    % Basophils 1 %    % Immature Granulocytes 1 %    NRBCs per 100 WBC 0 <1 /100    Absolute Neutrophils 4.8 1.6 - 8.3 10e3/uL    Absolute Lymphocytes 2.0 0.8 - 5.3 10e3/uL    Absolute Monocytes 0.4 0.0 - 1.3 10e3/uL    Absolute Eosinophils 0.2 0.0 -  0.7 10e3/uL    Absolute Basophils 0.0 0.0 - 0.2 10e3/uL    Absolute Immature Granulocytes 0.0 <=0.4 10e3/uL    Absolute NRBCs 0.0 10e3/uL   Extra Blue Top Tube   Result Value Ref Range    Hold Specimen JIC    Extra Red Top Tube   Result Value Ref Range    Hold Specimen JIC    Extra Green Top (Lithium Heparin) Tube   Result Value Ref Range    Hold Specimen JIC    CT Abdomen Pelvis w/o Contrast    Narrative    EXAM: CT ABDOMEN PELVIS W/O CONTRAST 3/31/2025 7:57 AM    HISTORY: left flank pain, hematuria    COMPARISON: CT chest 6/12/2023    TECHNIQUE:   Imaging protocol: Computed tomography of abdomen and pelvis without  contrast.  Acquisition: This CT exam was performed using one or more the  following dose reduction techniques: automated exposure control,  adjustment of the mA and/or kV according to patient size, and/or  iterative reconstruction technique.    FINDINGS:    LOWER CHEST:  Bibasilar atelectasis. No pulmonary mass or focal consolidation.    ABDOMEN/PELVIS:  LIVER: Normal contour. No suspicious liver lesions.  GALLBLADDER: No radio-opaque stones. No gallbladder wall thickening.  BILE DUCTS: No biliary tract dilatation.  PANCREAS: Within normal limits.  SPLEEN: Within normal limits.  ADRENALS: Within normal limits.    KIDNEYS/URETERS: No soft tissue mass. 4 x 2 mm stone in the distal  right ureter near the right ureterovesicular junction (series 2 image  184). No hydroureteronephrosis on the left or right.  URINARY BLADDER: Within normal limits.  REPRODUCTIVE ORGANS: No pelvic masses.    BOWEL: No bowel dilatation or wall thickening. Normal appendix.  PERITONEUM/RETROPERITONEUM: No free air or free fluid. Small  fat-containing umbilical hernia.  VESSELS: Within normal limits.  LYMPH NODES: There are no pathologically enlarged lymph nodes.    BONES AND SOFT TISSUES:  No suspicious osseous lesions. Degenerative periarticular changes and  spinal spondylosis.      Impression    IMPRESSION:  4 mm stone in the  distal right ureter near the right ureterovesicular  junction without hydronephrosis.    RUPERTO CADET MD         SYSTEM ID:  [id number]       Medications   tamsulosin (FLOMAX) capsule 0.4 mg (has no administration in time range)   ketorolac (TORADOL) injection 30 mg (30 mg Intravenous $Given 3/31/25 4803)       Assessments & Plan (with Medical Decision Making)     I have reviewed the nursing notes.    I have reviewed the findings, diagnosis, plan and need for follow up with the patient.  Patient here with 4 mm distal right ureteral stone.  This is interesting because her pain is on the left.  There is no left stone or hydronephrosis.  She is feeling better with above interventions.  She does have stone on CT scan, so we will treat her with some Flomax and anti-inflammatories.  Small prescription for something stronger given should she need that.  Referral placed to follow-up with urology.  Return here if worse, we did discuss signs and symptoms of UTI and in association with the stone she is told this is much more serious and she would need to come in right away if she notices any of those symptoms.        New Prescriptions    HYDROCODONE-ACETAMINOPHEN (NORCO) 5-325 MG TABLET    Take 1 tablet by mouth every 6 hours as needed for severe pain.    TAMSULOSIN (FLOMAX) 0.4 MG CAPSULE    Take 1 capsule (0.4 mg) by mouth daily.       Final diagnoses:   Calculus of ureter       3/31/2025   [facility]       Damion Dixon MD  03/31/25 0270

## 2025-03-31 NOTE — ED TRIAGE NOTES
Pt comes in states that she felt like she was beginning to have a UTI, frequent urination, this am woke up left flank pain and lower abd pain, denies kidney stones in past     Triage Assessment (Adult)       Row Name 03/31/25 0632          Triage Assessment    Airway WDL WDL        Respiratory WDL    Respiratory WDL WDL        Skin Circulation/Temperature WDL    Skin Circulation/Temperature WDL WDL        Cardiac WDL    Cardiac WDL WDL        Peripheral/Neurovascular WDL    Peripheral Neurovascular WDL WDL        Cognitive/Neuro/Behavioral WDL    Cognitive/Neuro/Behavioral WDL WDL

## 2025-03-31 NOTE — Clinical Note
Simi Godwin was seen and treated in our emergency department on 3/31/2025.  She may return to work on 04/01/2025.  She may need a day off from work.     If you have any questions or concerns, please don't hesitate to call.      Damion Dixon MD

## 2025-04-10 ENCOUNTER — MYC MEDICAL ADVICE (OUTPATIENT)
Dept: FAMILY MEDICINE | Facility: OTHER | Age: 30
End: 2025-04-10
Payer: COMMERCIAL

## 2025-04-10 DIAGNOSIS — E11.9 TYPE 2 DIABETES MELLITUS WITHOUT COMPLICATION, WITHOUT LONG-TERM CURRENT USE OF INSULIN (H): Primary | ICD-10-CM

## 2025-04-10 NOTE — TELEPHONE ENCOUNTER
Needs Rx for Pen needles.     Peng'd up order.     Routing to provider to review and respond.  Shayna Mcdermott RN on 4/10/2025 at 11:03 AM

## 2025-05-05 ENCOUNTER — MYC MEDICAL ADVICE (OUTPATIENT)
Dept: FAMILY MEDICINE | Facility: OTHER | Age: 30
End: 2025-05-05
Payer: COMMERCIAL

## 2025-05-05 DIAGNOSIS — E66.01 MORBID OBESITY (H): ICD-10-CM

## 2025-05-05 NOTE — TELEPHONE ENCOUNTER
Wanting to stop Ozempic due to no weight loss and extreme exhaustion. Would like to be strictly on Metformin again.    Per OV from 4/4:  Type 2 diabetes mellitus without complication, without long-term current use of insulin (H)  Diabetes has been under excellent control since starting GLP-1 she is really not had any substantial weight loss.  She has been on max dose of Ozempic.  She is lost only 1 pound.  She actually lost more weight when she was on metformin but unfortunately caused diarrhea so she discontinued.  Will switch to Mounjaro as ordered.  Await prior Auth.  For now recommend decreasing Ozempic 1 mg weekly and adding metformin 500 mg daily.  Once again encouraged daily movements.  She should start by walking at least 3000 steps per day  - tirzepatide (MOUNJARO) 5 MG/0.5ML SOAJ auto-injector pen; Inject 0.5 mLs (5 mg) subcutaneously once a week.    Peng'd up last order of Metformin 500 mg BID.     Routing to provider to review and respond.  Shayna Mcdermott RN on 5/5/2025 at 3:25 PM

## 2025-05-07 NOTE — TELEPHONE ENCOUNTER
Please have her schedule follow-up.  She can discontinue the Ozempic but concerned the metformin will not keep her blood sugars under control.    Jim Joya MD

## 2025-05-19 ENCOUNTER — OFFICE VISIT (OUTPATIENT)
Dept: FAMILY MEDICINE | Facility: OTHER | Age: 30
End: 2025-05-19
Attending: FAMILY MEDICINE
Payer: COMMERCIAL

## 2025-05-19 VITALS
RESPIRATION RATE: 18 BRPM | TEMPERATURE: 97.8 F | SYSTOLIC BLOOD PRESSURE: 138 MMHG | DIASTOLIC BLOOD PRESSURE: 80 MMHG | WEIGHT: 293 LBS | HEIGHT: 62 IN | OXYGEN SATURATION: 98 % | HEART RATE: 94 BPM | BODY MASS INDEX: 53.92 KG/M2

## 2025-05-19 DIAGNOSIS — E66.01 MORBID OBESITY (H): ICD-10-CM

## 2025-05-19 DIAGNOSIS — E11.9 TYPE 2 DIABETES MELLITUS WITHOUT COMPLICATION, WITHOUT LONG-TERM CURRENT USE OF INSULIN (H): Primary | ICD-10-CM

## 2025-05-19 PROCEDURE — G0463 HOSPITAL OUTPT CLINIC VISIT: HCPCS

## 2025-05-19 ASSESSMENT — PAIN SCALES - GENERAL: PAINLEVEL_OUTOF10: NO PAIN (0)

## 2025-05-19 NOTE — NURSING NOTE
Patient is here for medication check, patient is wanting to switch back to metformin from ozempic.     Previous A1C is at goal of <8  Lab Results   Component Value Date    A1C 6.3 02/18/2025    A1C 6.4 05/28/2024    A1C 6.2 01/16/2024    A1C 6.6 12/09/2022     Urine Microalbumin/Creat:    Albumin Urine mg/L   Date Value Ref Range Status   02/18/2025 <12.0 mg/L Final     Comment:     The reference ranges have not been established in urine albumin. The results should be integrated into the clinical context for interpretation.     Albumin Urine mg/g Cr   Date Value Ref Range Status   02/18/2025   Final     Comment:     Unable to calculate, urine albumin and/or urine creatinine is outside detectable limits.  Microalbuminuria is defined as an albumin:creatinine ratio of 17 to 299 for males and 25 to 299 for females. A ratio of albumin:creatinine of 300 or higher is indicative of overt proteinuria.  Due to biologic variability, positive results should be confirmed by a second, first-morning random or 24-hour timed urine specimen. If there is discrepancy, a third specimen is recommended. When 2 out of 3 results are in the microalbuminuria range, this is evidence for incipient nephropathy and warrants increased efforts at glucose control, blood pressure control, and institution of therapy with an angiotensin-converting-enzyme (ACE) inhibitor (if the patient can tolerate it).       Creatinine Urine   Date Value Ref Range Status   03/27/2019 119 mg/dL Final     Creatinine Urine mg/dL   Date Value Ref Range Status   02/18/2025 136.0 mg/dL Final     Comment:     The reference ranges have not been established in urine creatinine. The results should be integrated into the clinical context for interpretation.     Foot exam 2/2025  Eye exam 2/2025    Tobacco User vaping  Patient is not on a daily aspirin  Patient is not on a Statin.  Blood pressure today of:     BP Readings from Last 1 Encounters:   05/19/25 138/80      is at the  goal of <139/89 for diabetics.          Patient's last menstrual period was 03/07/2025 (exact date).  Medication Reconciliation: complete    Denisse Sweet LPN 5/19/2025 11:56 AM       Advance care directive on file? no  Advance care directive provided to patient? no       Denisse Sweet LPN

## 2025-05-19 NOTE — PROGRESS NOTES
"  Assessment & Plan     Morbid obesity (H)    - metFORMIN (GLUCOPHAGE) 1000 MG tablet; Take 1 tablet (1,000 mg) by mouth 2 times daily (with meals).    Type 2 diabetes mellitus without complication, without long-term current use of insulin (H)  Diabetes has been under excellent control.  Unfortunately she has had no substantial weight loss from a GLP-1 despite maximum dosage.  She is very sedentary.  She would like to go back on metformin.  Will start 500 mg twice daily then gradually increased to 1000 mg twice daily.  Discussed other GLP-1's but she declines.  Recommend daily walking of at least 15 minutes.    Follow-up again in 2 months.          BMI  Estimated body mass index is 63.65 kg/m  as calculated from the following:    Height as of this encounter: 1.575 m (5' 2\").    Weight as of this encounter: 157.9 kg (348 lb).         Jim Joya MD      Pk Barragan is a 30 year old, presenting for the following health issues:  Recheck Medication (Ozempic/ metformin )    30-year-old female presents to discuss weight management.  She has now been on Ozempic 2 mg weekly.  She has had no substantial weight loss.  She would like to go back on metformin because this seemed to have resulted in the 30 to 40 pound weight loss.  She denies any change in activity level or diet recently.  Blood sugars have been excellent.  She had some mild stomach upset with the GLP-1.  She is not interested in trying other glp-1  History of Present Illness       She eats 2-3 servings of fruits and vegetables daily.She consumes 1 sweetened beverage(s) daily.She exercises with enough effort to increase her heart rate 10 to 19 minutes per day.  She exercises with enough effort to increase her heart rate 3 or less days per week.   She is taking medications regularly.                  Review of Systems  Constitutional, HEENT, cardiovascular, pulmonary, gi and gu systems are negative, except as otherwise noted.      Objective    /80 " "  Pulse 94   Temp 97.8  F (36.6  C) (Tympanic)   Resp 18   Ht 1.575 m (5' 2\")   Wt (!) 157.9 kg (348 lb)   LMP 03/07/2025 (Exact Date)   SpO2 98%   Breastfeeding No   BMI 63.65 kg/m    Body mass index is 63.65 kg/m .  Physical Exam   GENERAL: alert and no distress  CV: regular rate and rhythm, normal S1 S2, no S3 or S4, no murmur, click or rub, no peripheral edema  PSYCH: mentation appears normal, affect normal/bright    No results found for any visits on 05/19/25.  Lab Results   Component Value Date    A1C 6.3 02/18/2025    A1C 6.4 05/28/2024    A1C 6.2 01/16/2024    A1C 6.6 12/09/2022             Signed Electronically by: Sho Joya MD    "

## 2025-05-22 NOTE — TELEPHONE ENCOUNTER
[facility] [place] sent Rx request for the following:      Requested Prescriptions   Pending Prescriptions Disp Refills    metFORMIN (GLUCOPHAGE) 1000 MG tablet [Pharmacy Med Name: METFORMIN 1000MG TABLETS] 180 tablet      Sig: TAKE 1 TABLET(1000 MG) BY MOUTH TWICE DAILY WITH MEALS       Biguanide Agents Failed - 5/22/2025 10:03 AM        Failed - Medication indicated for associated diagnosis     Medication is associated with one or more of the following diagnoses:     Gestational diabetes mellitus     Hyperinsulinar obesity     Hypersecretion of ovarian androgens    Non-alcoholic fatty liver    Polycystic ovarian syndrome               Pre-diabetes (DM 2 prevention)    Type 2 diabetes mellitus     Weight gain, antipsychotic therapy-induced    Impaired fasting glucose         Last Prescription Date:   5/19/25  Last Fill Qty/Refills:         90, R-2    Last Office Visit:              5/19/25   Future Office visit:           8/19/25    Patient requesting 90 day supply.    Routing refill request to provider for review/approval because:  Drug not on the [facility] refill protocol       Leanne Robin RN on 5/22/2025 at 10:05 AM

## 2025-06-07 ENCOUNTER — HEALTH MAINTENANCE LETTER (OUTPATIENT)
Age: 30
End: 2025-06-07

## (undated) DEVICE — SU VICRYL 0 CTX 36" J370H

## (undated) DEVICE — SLEEVE COMPRESSION SCD KNEE MED 74022

## (undated) DEVICE — DRSG MEDIPORE 3 1/2X8" 3570

## (undated) DEVICE — DRSG STERI STRIP 1/2X4" R1547

## (undated) DEVICE — PACK C SECTION SMA15CSFCA

## (undated) DEVICE — Device

## (undated) DEVICE — COVER LIGHT HANDLE LT-F02

## (undated) DEVICE — SU PDS II 0 CTX 60" Z990G

## (undated) DEVICE — PAD CHUX UNDERPAD 30X36" P3036C

## (undated) DEVICE — SU CHROMIC 1 CTX 36" 905H

## (undated) DEVICE — PREP CHLORAPREP 26ML TINTED ORANGE  260815

## (undated) DEVICE — SOL WATER 1500ML

## (undated) DEVICE — ESU GROUND PAD ADULT W/CORD E7507

## (undated) DEVICE — GLOVE PROTEXIS POWDER FREE SMT 7.5  2D72PT75X

## (undated) DEVICE — STPL SKIN SUBCUTICULAR INSORB  2030

## (undated) DEVICE — RETR PANNICULUS TRAXI FOR PT POSITIONING PRS-1030

## (undated) DEVICE — SU VICRYL 3-0 CT 36" J356H

## (undated) RX ORDER — IBUPROFEN 200 MG
TABLET ORAL
Status: DISPENSED
Start: 2020-04-02

## (undated) RX ORDER — CEFAZOLIN SODIUM 1 G/3ML
INJECTION, POWDER, FOR SOLUTION INTRAMUSCULAR; INTRAVENOUS
Status: DISPENSED
Start: 2019-03-28

## (undated) RX ORDER — SODIUM CHLORIDE, SODIUM LACTATE, POTASSIUM CHLORIDE, CALCIUM CHLORIDE 600; 310; 30; 20 MG/100ML; MG/100ML; MG/100ML; MG/100ML
INJECTION, SOLUTION INTRAVENOUS
Status: DISPENSED
Start: 2019-03-27

## (undated) RX ORDER — GINSENG 100 MG
CAPSULE ORAL
Status: DISPENSED
Start: 2018-07-31

## (undated) RX ORDER — OSELTAMIVIR PHOSPHATE 75 MG/1
CAPSULE ORAL
Status: DISPENSED
Start: 2020-01-17

## (undated) RX ORDER — KETOROLAC TROMETHAMINE 30 MG/ML
INJECTION, SOLUTION INTRAMUSCULAR; INTRAVENOUS
Status: DISPENSED
Start: 2020-01-17

## (undated) RX ORDER — BUPIVACAINE HYDROCHLORIDE AND EPINEPHRINE 5; 5 MG/ML; UG/ML
INJECTION, SOLUTION EPIDURAL; INTRACAUDAL; PERINEURAL
Status: DISPENSED
Start: 2019-03-28

## (undated) RX ORDER — TAMSULOSIN HYDROCHLORIDE 0.4 MG/1
CAPSULE ORAL
Status: DISPENSED
Start: 2025-03-31

## (undated) RX ORDER — SODIUM CHLORIDE 9 MG/ML
INJECTION, SOLUTION INTRAVENOUS
Status: DISPENSED
Start: 2019-03-28

## (undated) RX ORDER — METHYLERGONOVINE MALEATE 0.2 MG/ML
INJECTION INTRAVENOUS
Status: DISPENSED
Start: 2019-03-28

## (undated) RX ORDER — OXYTOCIN 10 [USP'U]/ML
INJECTION, SOLUTION INTRAMUSCULAR; INTRAVENOUS
Status: DISPENSED
Start: 2019-03-28

## (undated) RX ORDER — ACETAMINOPHEN 500 MG
TABLET ORAL
Status: DISPENSED
Start: 2023-06-12

## (undated) RX ORDER — MORPHINE SULFATE 1 MG/ML
INJECTION, SOLUTION EPIDURAL; INTRATHECAL; INTRAVENOUS
Status: DISPENSED
Start: 2019-03-28

## (undated) RX ORDER — CEFTRIAXONE SODIUM 1 G
VIAL (EA) INJECTION
Status: DISPENSED
Start: 2020-02-22

## (undated) RX ORDER — SODIUM CHLORIDE 9 MG/ML
INJECTION, SOLUTION INTRAVENOUS
Status: DISPENSED
Start: 2020-01-17

## (undated) RX ORDER — ACETAMINOPHEN 500 MG
TABLET ORAL
Status: DISPENSED
Start: 2020-01-17

## (undated) RX ORDER — KETOROLAC TROMETHAMINE 30 MG/ML
INJECTION, SOLUTION INTRAMUSCULAR; INTRAVENOUS
Status: DISPENSED
Start: 2025-03-31

## (undated) RX ORDER — LIDOCAINE HYDROCHLORIDE 10 MG/ML
INJECTION, SOLUTION EPIDURAL; INFILTRATION; INTRACAUDAL; PERINEURAL
Status: DISPENSED
Start: 2020-02-22

## (undated) RX ORDER — KETOROLAC TROMETHAMINE 30 MG/ML
INJECTION, SOLUTION INTRAMUSCULAR; INTRAVENOUS
Status: DISPENSED
Start: 2019-03-28

## (undated) RX ORDER — ONDANSETRON 2 MG/ML
INJECTION INTRAMUSCULAR; INTRAVENOUS
Status: DISPENSED
Start: 2019-03-28

## (undated) RX ORDER — FENTANYL CITRATE 50 UG/ML
INJECTION, SOLUTION INTRAMUSCULAR; INTRAVENOUS
Status: DISPENSED
Start: 2019-03-28